# Patient Record
Sex: MALE | Race: WHITE | NOT HISPANIC OR LATINO | Employment: OTHER | URBAN - METROPOLITAN AREA
[De-identification: names, ages, dates, MRNs, and addresses within clinical notes are randomized per-mention and may not be internally consistent; named-entity substitution may affect disease eponyms.]

---

## 2017-02-23 ENCOUNTER — AMBULATORY - HEALTHEAST (OUTPATIENT)
Dept: CARE COORDINATION | Facility: CLINIC | Age: 82
End: 2017-02-23

## 2017-02-28 ENCOUNTER — AMBULATORY - HEALTHEAST (OUTPATIENT)
Dept: CARDIOLOGY | Facility: CLINIC | Age: 82
End: 2017-02-28

## 2017-02-28 DIAGNOSIS — Z95.810 ICD (IMPLANTABLE CARDIOVERTER-DEFIBRILLATOR) IN PLACE: ICD-10-CM

## 2017-03-07 ENCOUNTER — AMBULATORY - HEALTHEAST (OUTPATIENT)
Dept: CARDIOLOGY | Facility: CLINIC | Age: 82
End: 2017-03-07

## 2017-03-07 DIAGNOSIS — I42.0 DILATED IDIOPATHIC CARDIOMYOPATHY (H): ICD-10-CM

## 2017-03-07 DIAGNOSIS — R06.02 SHORTNESS OF BREATH: ICD-10-CM

## 2017-04-05 ENCOUNTER — RECORDS - HEALTHEAST (OUTPATIENT)
Dept: ADMINISTRATIVE | Facility: OTHER | Age: 82
End: 2017-04-05

## 2017-04-10 ENCOUNTER — HOSPITAL ENCOUNTER (OUTPATIENT)
Dept: CT IMAGING | Facility: CLINIC | Age: 82
Discharge: HOME | End: 2017-04-10
Attending: INTERNAL MEDICINE

## 2017-04-10 DIAGNOSIS — C85.90 NON HODGKIN'S LYMPHOMA (H): ICD-10-CM

## 2017-04-11 ENCOUNTER — RECORDS - HEALTHEAST (OUTPATIENT)
Dept: ADMINISTRATIVE | Facility: OTHER | Age: 82
End: 2017-04-11

## 2017-04-14 ENCOUNTER — COMMUNICATION - HEALTHEAST (OUTPATIENT)
Dept: INTERVENTIONAL RADIOLOGY/VASCULAR | Facility: CLINIC | Age: 82
End: 2017-04-14

## 2017-04-17 ENCOUNTER — RECORDS - HEALTHEAST (OUTPATIENT)
Dept: LAB | Facility: CLINIC | Age: 82
End: 2017-04-17

## 2017-04-17 LAB
CHOLEST SERPL-MCNC: 182 MG/DL
FASTING STATUS PATIENT QL REPORTED: ABNORMAL
HDLC SERPL-MCNC: 26 MG/DL
LDLC SERPL CALC-MCNC: 54 MG/DL
LDLC SERPL CALC-MCNC: ABNORMAL MG/DL
TRIGL SERPL-MCNC: 980 MG/DL

## 2017-04-19 ENCOUNTER — RECORDS - HEALTHEAST (OUTPATIENT)
Dept: LAB | Facility: CLINIC | Age: 82
End: 2017-04-19

## 2017-04-19 ENCOUNTER — HOSPITAL ENCOUNTER (OUTPATIENT)
Dept: ULTRASOUND IMAGING | Facility: HOSPITAL | Age: 82
Discharge: HOME | End: 2017-04-19
Attending: INTERNAL MEDICINE

## 2017-04-19 DIAGNOSIS — C85.90 NON HODGKIN'S LYMPHOMA (H): ICD-10-CM

## 2017-04-19 LAB
CHOLEST SERPL-MCNC: 202 MG/DL
FASTING STATUS PATIENT QL REPORTED: YES
HDLC SERPL-MCNC: 30 MG/DL
LDLC SERPL CALC-MCNC: 58 MG/DL
LDLC SERPL CALC-MCNC: ABNORMAL MG/DL
TRIGL SERPL-MCNC: 837 MG/DL

## 2017-04-21 LAB
LAB AP CHARGES (HE HISTORICAL CONVERSION): ABNORMAL
LAB AP CHARGES (HE HISTORICAL CONVERSION): ABNORMAL
LAB AP INITIAL CYTO EVAL (HE HISTORICAL CONVERSION): ABNORMAL
LAB MED GENERAL PATH INTERP (HE HISTORICAL CONVERSION): ABNORMAL
PATH REPORT.COMMENTS IMP SPEC: ABNORMAL
PATH REPORT.FINAL DX SPEC: ABNORMAL
PATH REPORT.FINAL DX SPEC: ABNORMAL
PATH REPORT.MICROSCOPIC SPEC OTHER STN: ABNORMAL
PATH REPORT.RELEVANT HX SPEC: ABNORMAL
RESULT FLAG (HE HISTORICAL CONVERSION): ABNORMAL
SPECIMEN DESCRIPTION: ABNORMAL

## 2017-05-10 ENCOUNTER — AMBULATORY - HEALTHEAST (OUTPATIENT)
Dept: LAB | Facility: CLINIC | Age: 82
End: 2017-05-10

## 2017-05-10 ENCOUNTER — HOSPITAL ENCOUNTER (OUTPATIENT)
Dept: CARDIOLOGY | Facility: CLINIC | Age: 82
Discharge: HOME | End: 2017-05-10
Attending: INTERNAL MEDICINE

## 2017-05-10 DIAGNOSIS — R06.02 SHORTNESS OF BREATH: ICD-10-CM

## 2017-05-10 DIAGNOSIS — I42.0 DILATED IDIOPATHIC CARDIOMYOPATHY (H): ICD-10-CM

## 2017-05-10 LAB
AORTIC ROOT: 3 CM
BSA FOR ECHO PROCEDURE: 1.98 M2
CV BLOOD PRESSURE: NORMAL MMHG
CV ECHO HEIGHT: 69 IN
CV ECHO WEIGHT: 178 LBS
DOP CALC LVOT AREA: 3.46 CM2
DOP CALC LVOT DIAMETER: 2.1 CM
DOP CALC LVOT PEAK VEL: 59.2 CM/S
DOP CALC LVOT STROKE VOLUME: 41.5 CM3
DOP CALCLVOT PEAK VEL VTI: 12 CM
EJECTION FRACTION: 41 % (ref 55–75)
FRACTIONAL SHORTENING: 21 % (ref 28–44)
INTERVENTRICULAR SEPTUM IN END DIASTOLE: 0.95 CM (ref 0.6–1)
IVS/PW RATIO: 1
LA AREA 1: 12.5 CM2
LA AREA 2: 11.9 CM2
LEFT ATRIUM LENGTH: 3.9 CM
LEFT ATRIUM SIZE: 3.2 CM
LEFT ATRIUM TO AORTIC ROOT RATIO: 1.07 NO UNITS
LEFT ATRIUM VOLUME INDEX: 16.4 ML/M2
LEFT ATRIUM VOLUME: 32.4 CM3
LEFT VENTRICLE CARDIAC INDEX: 1.3 L/MIN/M2
LEFT VENTRICLE CARDIAC OUTPUT: 2.5 L/MIN
LEFT VENTRICLE DIASTOLIC VOLUME INDEX: 37.9 CM3/M2 (ref 34–74)
LEFT VENTRICLE DIASTOLIC VOLUME: 75 CM3 (ref 62–150)
LEFT VENTRICLE HEART RATE: 60 BPM
LEFT VENTRICLE MASS INDEX: 121 G/M2
LEFT VENTRICLE SYSTOLIC VOLUME INDEX: 22.2 CM3/M2 (ref 11–31)
LEFT VENTRICLE SYSTOLIC VOLUME: 44 CM3 (ref 21–61)
LEFT VENTRICULAR INTERNAL DIMENSION IN DIASTOLE: 6.2 CM (ref 4.2–5.8)
LEFT VENTRICULAR INTERNAL DIMENSION IN SYSTOLE: 4.9 CM (ref 2.5–4)
LEFT VENTRICULAR MASS: 239.6 G
LEFT VENTRICULAR OUTFLOW TRACT MEAN GRADIENT: 1 MMHG
LEFT VENTRICULAR OUTFLOW TRACT MEAN VELOCITY: 45 CM/S
LEFT VENTRICULAR OUTFLOW TRACT PEAK GRADIENT: 1 MMHG
LEFT VENTRICULAR POSTERIOR WALL IN END DIASTOLE: 0.92 CM (ref 0.6–1)
LV STROKE VOLUME INDEX: 21 ML/M2
MITRAL VALVE E/A RATIO: 0.5
MV AVERAGE E/E' RATIO: 7.5 CM/S
MV DECELERATION TIME: 602 MS
MV E'TISSUE VEL-LAT: 5.46 CM/S
MV E'TISSUE VEL-MED: 3.41 CM/S
MV LATERAL E/E' RATIO: 6.1
MV MEDIAL E/E' RATIO: 9.8
MV PEAK A VELOCITY: 71 CM/S
MV PEAK E VELOCITY: 33.3 CM/S
NUC REST DIASTOLIC VOLUME INDEX: 2848 LBS
NUC REST SYSTOLIC VOLUME INDEX: 69 IN
TRICUSPID REGURGITATION PEAK PRESSURE GRADIENT: 25.2 MMHG
TRICUSPID VALVE PEAK REGURGITANT VELOCITY: 251 CM/S

## 2017-05-10 ASSESSMENT — MIFFLIN-ST. JEOR: SCORE: 1482.78

## 2017-05-16 ENCOUNTER — AMBULATORY - HEALTHEAST (OUTPATIENT)
Dept: CARDIOLOGY | Facility: CLINIC | Age: 82
End: 2017-05-16

## 2017-05-19 ENCOUNTER — OFFICE VISIT - HEALTHEAST (OUTPATIENT)
Dept: CARDIOLOGY | Facility: CLINIC | Age: 82
End: 2017-05-19

## 2017-05-19 ENCOUNTER — AMBULATORY - HEALTHEAST (OUTPATIENT)
Dept: CARDIOLOGY | Facility: CLINIC | Age: 82
End: 2017-05-19

## 2017-05-19 DIAGNOSIS — I42.8 OTHER PRIMARY CARDIOMYOPATHIES: ICD-10-CM

## 2017-05-19 DIAGNOSIS — R74.8 ELEVATED CK: ICD-10-CM

## 2017-05-19 ASSESSMENT — MIFFLIN-ST. JEOR: SCORE: 1491.85

## 2017-05-22 ENCOUNTER — AMBULATORY - HEALTHEAST (OUTPATIENT)
Dept: CARDIOLOGY | Facility: CLINIC | Age: 82
End: 2017-05-22

## 2017-05-22 DIAGNOSIS — R74.8 ELEVATED CK: ICD-10-CM

## 2017-05-22 DIAGNOSIS — I42.9 CARDIOMYOPATHY (H): ICD-10-CM

## 2017-05-22 DIAGNOSIS — I10 HTN (HYPERTENSION): ICD-10-CM

## 2017-05-30 ENCOUNTER — RECORDS - HEALTHEAST (OUTPATIENT)
Dept: ADMINISTRATIVE | Facility: OTHER | Age: 82
End: 2017-05-30

## 2017-06-06 ENCOUNTER — COMMUNICATION - HEALTHEAST (OUTPATIENT)
Dept: CARDIOLOGY | Facility: CLINIC | Age: 82
End: 2017-06-06

## 2017-06-06 DIAGNOSIS — I48.91 A-FIB (H): ICD-10-CM

## 2017-06-12 ENCOUNTER — HOSPITAL ENCOUNTER (OUTPATIENT)
Dept: CT IMAGING | Facility: CLINIC | Age: 82
Discharge: HOME | End: 2017-06-12
Attending: INTERNAL MEDICINE

## 2017-06-12 DIAGNOSIS — C85.90 NON HODGKIN'S LYMPHOMA (H): ICD-10-CM

## 2017-06-20 ENCOUNTER — RECORDS - HEALTHEAST (OUTPATIENT)
Dept: ADMINISTRATIVE | Facility: OTHER | Age: 82
End: 2017-06-20

## 2017-06-27 ENCOUNTER — AMBULATORY - HEALTHEAST (OUTPATIENT)
Dept: CARDIOLOGY | Facility: CLINIC | Age: 82
End: 2017-06-27

## 2017-06-27 DIAGNOSIS — Z95.810 ICD (IMPLANTABLE CARDIOVERTER-DEFIBRILLATOR) IN PLACE: ICD-10-CM

## 2017-06-27 LAB — HCC DEVICE COMMENTS: NORMAL

## 2017-07-10 ENCOUNTER — RECORDS - HEALTHEAST (OUTPATIENT)
Dept: LAB | Facility: CLINIC | Age: 82
End: 2017-07-10

## 2017-07-10 LAB
CHOLEST SERPL-MCNC: 143 MG/DL
FASTING STATUS PATIENT QL REPORTED: NO
HDLC SERPL-MCNC: 28 MG/DL
LDLC SERPL CALC-MCNC: 60 MG/DL
LDLC SERPL CALC-MCNC: ABNORMAL MG/DL
TRIGL SERPL-MCNC: 430 MG/DL

## 2017-08-31 ENCOUNTER — AMBULATORY - HEALTHEAST (OUTPATIENT)
Dept: CARDIOLOGY | Facility: CLINIC | Age: 82
End: 2017-08-31

## 2017-08-31 DIAGNOSIS — Z95.0 CARDIAC PACEMAKER IN SITU: ICD-10-CM

## 2017-08-31 LAB — HCC DEVICE COMMENTS: NORMAL

## 2017-08-31 RX ORDER — FENOFIBRATE 145 MG/1
145 TABLET, COATED ORAL DAILY
Status: SHIPPED | COMMUNITY
Start: 2017-08-31 | End: 2023-12-18

## 2017-08-31 ASSESSMENT — MIFFLIN-ST. JEOR: SCORE: 1487.32

## 2017-09-15 ENCOUNTER — HOSPITAL ENCOUNTER (OUTPATIENT)
Dept: CT IMAGING | Facility: CLINIC | Age: 82
Discharge: HOME | End: 2017-09-15
Attending: INTERNAL MEDICINE

## 2017-09-15 DIAGNOSIS — C85.90 NON HODGKIN'S LYMPHOMA (H): ICD-10-CM

## 2017-11-29 ENCOUNTER — AMBULATORY - HEALTHEAST (OUTPATIENT)
Dept: CARDIOLOGY | Facility: CLINIC | Age: 82
End: 2017-11-29

## 2017-11-29 DIAGNOSIS — Z95.810 ICD (IMPLANTABLE CARDIOVERTER-DEFIBRILLATOR) IN PLACE: ICD-10-CM

## 2017-11-29 LAB — HCC DEVICE COMMENTS: NORMAL

## 2017-12-12 ENCOUNTER — RECORDS - HEALTHEAST (OUTPATIENT)
Dept: LAB | Facility: CLINIC | Age: 82
End: 2017-12-12

## 2017-12-12 LAB
CHOLEST SERPL-MCNC: 173 MG/DL
FASTING STATUS PATIENT QL REPORTED: NO
HDLC SERPL-MCNC: 28 MG/DL
LDLC SERPL CALC-MCNC: 57 MG/DL
LDLC SERPL CALC-MCNC: ABNORMAL MG/DL
TRIGL SERPL-MCNC: 823 MG/DL

## 2018-03-02 ENCOUNTER — AMBULATORY - HEALTHEAST (OUTPATIENT)
Dept: CARDIOLOGY | Facility: CLINIC | Age: 83
End: 2018-03-02

## 2018-03-02 DIAGNOSIS — I42.9 CARDIOMYOPATHY (H): ICD-10-CM

## 2018-03-07 ENCOUNTER — AMBULATORY - HEALTHEAST (OUTPATIENT)
Dept: CARDIOLOGY | Facility: CLINIC | Age: 83
End: 2018-03-07

## 2018-03-07 DIAGNOSIS — Z95.0 CARDIAC PACEMAKER IN SITU: ICD-10-CM

## 2018-03-07 LAB — HCC DEVICE COMMENTS: NORMAL

## 2018-05-30 ENCOUNTER — RECORDS - HEALTHEAST (OUTPATIENT)
Dept: LAB | Facility: CLINIC | Age: 83
End: 2018-05-30

## 2018-05-30 LAB
ALBUMIN SERPL-MCNC: 4 G/DL (ref 3.5–5)
ALP SERPL-CCNC: 44 U/L (ref 45–120)
ALT SERPL W P-5'-P-CCNC: 40 U/L (ref 0–45)
ANION GAP SERPL CALCULATED.3IONS-SCNC: 9 MMOL/L (ref 5–18)
AST SERPL W P-5'-P-CCNC: 29 U/L (ref 0–40)
BILIRUB SERPL-MCNC: 0.8 MG/DL (ref 0–1)
BUN SERPL-MCNC: 34 MG/DL (ref 8–28)
CALCIUM SERPL-MCNC: 10 MG/DL (ref 8.5–10.5)
CHLORIDE BLD-SCNC: 105 MMOL/L (ref 98–107)
CO2 SERPL-SCNC: 24 MMOL/L (ref 22–31)
CREAT SERPL-MCNC: 1.89 MG/DL (ref 0.7–1.3)
GFR SERPL CREATININE-BSD FRML MDRD: 34 ML/MIN/1.73M2
GLUCOSE BLD-MCNC: 116 MG/DL (ref 70–125)
POTASSIUM BLD-SCNC: 4.9 MMOL/L (ref 3.5–5)
PROT SERPL-MCNC: 6.5 G/DL (ref 6–8)
SODIUM SERPL-SCNC: 138 MMOL/L (ref 136–145)

## 2018-06-12 ENCOUNTER — AMBULATORY - HEALTHEAST (OUTPATIENT)
Dept: CARDIOLOGY | Facility: CLINIC | Age: 83
End: 2018-06-12

## 2018-06-12 DIAGNOSIS — Z95.0 CARDIAC PACEMAKER IN SITU: ICD-10-CM

## 2018-06-12 LAB
HCC DEVICE COMMENTS: NORMAL
HCC DEVICE IMPLANTING PROVIDER: NORMAL
HCC DEVICE MANUFACTURE: NORMAL
HCC DEVICE MODEL: NORMAL
HCC DEVICE SERIAL NUMBER: NORMAL
HCC DEVICE TYPE: NORMAL

## 2018-06-19 ENCOUNTER — RECORDS - HEALTHEAST (OUTPATIENT)
Dept: ADMINISTRATIVE | Facility: OTHER | Age: 83
End: 2018-06-19

## 2018-06-28 ENCOUNTER — HOSPITAL ENCOUNTER (OUTPATIENT)
Dept: CT IMAGING | Facility: CLINIC | Age: 83
Discharge: HOME | End: 2018-06-28
Attending: INTERNAL MEDICINE

## 2018-06-28 DIAGNOSIS — C85.90 NON HODGKIN'S LYMPHOMA (H): ICD-10-CM

## 2018-06-28 LAB
CREAT BLD-MCNC: 1.6 MG/DL
POC GFR AMER AF HE - HISTORICAL: 52 ML/MIN/1.73M2
POC GFR NON AMER AF HE - HISTORICAL: 42 ML/MIN/1.73M2

## 2018-08-20 ENCOUNTER — AMBULATORY - HEALTHEAST (OUTPATIENT)
Dept: CARDIOLOGY | Facility: CLINIC | Age: 83
End: 2018-08-20

## 2018-08-20 ENCOUNTER — AMBULATORY - HEALTHEAST (OUTPATIENT)
Dept: LAB | Facility: HOSPITAL | Age: 83
End: 2018-08-20

## 2018-08-20 ENCOUNTER — HOSPITAL ENCOUNTER (OUTPATIENT)
Dept: CARDIOLOGY | Facility: HOSPITAL | Age: 83
Discharge: HOME | End: 2018-08-20
Attending: INTERNAL MEDICINE

## 2018-08-20 DIAGNOSIS — I42.9 CARDIOMYOPATHY (H): ICD-10-CM

## 2018-08-20 DIAGNOSIS — R06.02 SHORTNESS OF BREATH: ICD-10-CM

## 2018-08-20 LAB — BNP SERPL-MCNC: 67 PG/ML (ref 0–91)

## 2018-08-20 ASSESSMENT — MIFFLIN-ST. JEOR: SCORE: 1487.32

## 2018-08-21 LAB
AORTIC ROOT: 3.2 CM
AR DECEL SLOPE: 1860 MM/S2
AR PEAK VELOCITY: 365 CM/S
AV REGURGITANT PEAK GRADIENT: 53.3 MMHG
AV REGURGITATION PRESSURE HALF TIME: 574 MS
BSA FOR ECHO PROCEDURE: 1.99 M2
CV ECHO HEIGHT: 69 IN
CV ECHO WEIGHT: 179 LBS
DOP CALC LVOT AREA: 3.8 CM2
DOP CALC LVOT DIAMETER: 2.2 CM
DOP CALC LVOT PEAK VEL: 65.8 CM/S
DOP CALC LVOT STROKE VOLUME: 47.1 CM3
DOP CALCLVOT PEAK VEL VTI: 12.4 CM
EJECTION FRACTION: 45 % (ref 55–75)
FRACTIONAL SHORTENING: 12.9 % (ref 28–44)
INTERVENTRICULAR SEPTUM IN END DIASTOLE: 1.12 CM (ref 0.6–1)
IVS/PW RATIO: 1.2
LA AREA 1: 16.4 CM2
LA AREA 2: 17.2 CM2
LEFT ATRIUM LENGTH: 4.6 CM
LEFT ATRIUM SIZE: 2.8 CM
LEFT ATRIUM TO AORTIC ROOT RATIO: 0.88 NO UNITS
LEFT ATRIUM VOLUME INDEX: 26.2 ML/M2
LEFT ATRIUM VOLUME: 52.1 ML
LEFT VENTRICLE CARDIAC INDEX: 1.4 L/MIN/M2
LEFT VENTRICLE CARDIAC OUTPUT: 2.8 L/MIN
LEFT VENTRICLE DIASTOLIC VOLUME INDEX: 49.3 CM3/M2 (ref 34–74)
LEFT VENTRICLE DIASTOLIC VOLUME: 98.1 CM3 (ref 62–150)
LEFT VENTRICLE HEART RATE: 60 BPM
LEFT VENTRICLE MASS INDEX: 100.1 G/M2
LEFT VENTRICLE SYSTOLIC VOLUME INDEX: 27.1 CM3/M2 (ref 11–31)
LEFT VENTRICLE SYSTOLIC VOLUME: 53.9 CM3 (ref 21–61)
LEFT VENTRICULAR INTERNAL DIMENSION IN DIASTOLE: 5.1 CM (ref 4.2–5.8)
LEFT VENTRICULAR INTERNAL DIMENSION IN SYSTOLE: 4.44 CM (ref 2.5–4)
LEFT VENTRICULAR MASS: 199.2 G
LEFT VENTRICULAR OUTFLOW TRACT MEAN GRADIENT: 1 MMHG
LEFT VENTRICULAR OUTFLOW TRACT MEAN VELOCITY: 43.6 CM/S
LEFT VENTRICULAR OUTFLOW TRACT PEAK GRADIENT: 2 MMHG
LEFT VENTRICULAR POSTERIOR WALL IN END DIASTOLE: 0.97 CM (ref 0.6–1)
LV STROKE VOLUME INDEX: 23.7 ML/M2
MITRAL VALVE DECELERATION SLOPE: 1250 MM/S2
MITRAL VALVE E/A RATIO: 0.5
MITRAL VALVE PRESSURE HALF-TIME: 98 MS
MV AVERAGE E/E' RATIO: 8.5 CM/S
MV DECELERATION TIME: 335 MS
MV E'TISSUE VEL-LAT: 5.03 CM/S
MV E'TISSUE VEL-MED: 4.74 CM/S
MV LATERAL E/E' RATIO: 8.3
MV MEDIAL E/E' RATIO: 8.8
MV PEAK A VELOCITY: 89.2 CM/S
MV PEAK E VELOCITY: 41.7 CM/S
MV VALVE AREA PRESSURE 1/2 METHOD: 2.2 CM2
NUC REST DIASTOLIC VOLUME INDEX: 2864 LBS
NUC REST SYSTOLIC VOLUME INDEX: 69 IN
TRICUSPID REGURGITATION PEAK PRESSURE GRADIENT: 23.6 MMHG
TRICUSPID VALVE ANULAR PLANE SYSTOLIC EXCURSION: 2.1 CM
TRICUSPID VALVE PEAK REGURGITANT VELOCITY: 243 CM/S

## 2018-08-27 ENCOUNTER — RECORDS - HEALTHEAST (OUTPATIENT)
Dept: ADMINISTRATIVE | Facility: OTHER | Age: 83
End: 2018-08-27

## 2018-08-27 ENCOUNTER — AMBULATORY - HEALTHEAST (OUTPATIENT)
Dept: CARDIOLOGY | Facility: CLINIC | Age: 83
End: 2018-08-27

## 2018-08-30 ENCOUNTER — AMBULATORY - HEALTHEAST (OUTPATIENT)
Dept: CARDIOLOGY | Facility: CLINIC | Age: 83
End: 2018-08-30

## 2018-08-30 ENCOUNTER — OFFICE VISIT - HEALTHEAST (OUTPATIENT)
Dept: CARDIOLOGY | Facility: CLINIC | Age: 83
End: 2018-08-30

## 2018-08-30 DIAGNOSIS — R74.8 ELEVATED CK: ICD-10-CM

## 2018-08-30 DIAGNOSIS — E78.5 DYSLIPIDEMIA, GOAL LDL BELOW 70: ICD-10-CM

## 2018-08-30 DIAGNOSIS — Z95.810 ICD (IMPLANTABLE CARDIOVERTER-DEFIBRILLATOR) IN PLACE: ICD-10-CM

## 2018-08-30 DIAGNOSIS — I42.9 IDIOPATHIC CARDIOMYOPATHY (H): ICD-10-CM

## 2018-08-30 DIAGNOSIS — N18.30 CKD (CHRONIC KIDNEY DISEASE) STAGE 3, GFR 30-59 ML/MIN (H): ICD-10-CM

## 2018-08-30 LAB
ANION GAP SERPL CALCULATED.3IONS-SCNC: 9 MMOL/L (ref 5–18)
BUN SERPL-MCNC: 23 MG/DL (ref 8–28)
CALCIUM SERPL-MCNC: 9.8 MG/DL (ref 8.5–10.5)
CHLORIDE BLD-SCNC: 105 MMOL/L (ref 98–107)
CK SERPL-CCNC: 278 U/L (ref 30–190)
CO2 SERPL-SCNC: 23 MMOL/L (ref 22–31)
CREAT SERPL-MCNC: 1.45 MG/DL (ref 0.7–1.3)
GFR SERPL CREATININE-BSD FRML MDRD: 47 ML/MIN/1.73M2
GLUCOSE BLD-MCNC: 180 MG/DL (ref 70–125)
HCC DEVICE COMMENTS: NORMAL
HCC DEVICE IMPLANTING PROVIDER: NORMAL
HCC DEVICE MANUFACTURE: NORMAL
HCC DEVICE MODEL: NORMAL
HCC DEVICE SERIAL NUMBER: NORMAL
HCC DEVICE TYPE: NORMAL
POTASSIUM BLD-SCNC: 4.8 MMOL/L (ref 3.5–5)
SODIUM SERPL-SCNC: 137 MMOL/L (ref 136–145)

## 2018-08-30 ASSESSMENT — MIFFLIN-ST. JEOR: SCORE: 1510

## 2018-09-12 ENCOUNTER — COMMUNICATION - HEALTHEAST (OUTPATIENT)
Dept: CARDIOLOGY | Facility: CLINIC | Age: 83
End: 2018-09-12

## 2018-09-12 ENCOUNTER — AMBULATORY - HEALTHEAST (OUTPATIENT)
Dept: CARDIOLOGY | Facility: CLINIC | Age: 83
End: 2018-09-12

## 2018-09-12 DIAGNOSIS — R74.8 ELEVATED CK: ICD-10-CM

## 2018-09-12 DIAGNOSIS — R74.8 ELEVATED CPK: ICD-10-CM

## 2018-09-12 DIAGNOSIS — N18.30 CKD (CHRONIC KIDNEY DISEASE) STAGE 3, GFR 30-59 ML/MIN (H): ICD-10-CM

## 2018-09-12 DIAGNOSIS — I42.9 IDIOPATHIC CARDIOMYOPATHY (H): ICD-10-CM

## 2018-09-12 LAB
ANION GAP SERPL CALCULATED.3IONS-SCNC: 11 MMOL/L (ref 5–18)
BUN SERPL-MCNC: 27 MG/DL (ref 8–28)
CALCIUM SERPL-MCNC: 9.9 MG/DL (ref 8.5–10.5)
CHLORIDE BLD-SCNC: 102 MMOL/L (ref 98–107)
CK SERPL-CCNC: 525 U/L (ref 30–190)
CO2 SERPL-SCNC: 24 MMOL/L (ref 22–31)
CREAT SERPL-MCNC: 1.91 MG/DL (ref 0.7–1.3)
GFR SERPL CREATININE-BSD FRML MDRD: 34 ML/MIN/1.73M2
GLUCOSE BLD-MCNC: 180 MG/DL (ref 70–125)
POTASSIUM BLD-SCNC: 4.7 MMOL/L (ref 3.5–5)
SODIUM SERPL-SCNC: 137 MMOL/L (ref 136–145)

## 2018-09-18 ENCOUNTER — COMMUNICATION - HEALTHEAST (OUTPATIENT)
Dept: CARDIOLOGY | Facility: CLINIC | Age: 83
End: 2018-09-18

## 2018-09-27 ENCOUNTER — AMBULATORY - HEALTHEAST (OUTPATIENT)
Dept: CARDIOLOGY | Facility: CLINIC | Age: 83
End: 2018-09-27

## 2018-09-27 DIAGNOSIS — R74.8 ELEVATED CPK: ICD-10-CM

## 2018-09-27 LAB — CK SERPL-CCNC: 191 U/L (ref 30–190)

## 2018-09-28 ENCOUNTER — AMBULATORY - HEALTHEAST (OUTPATIENT)
Dept: CARDIOLOGY | Facility: CLINIC | Age: 83
End: 2018-09-28

## 2018-09-28 DIAGNOSIS — E78.5 DYSLIPIDEMIA: ICD-10-CM

## 2018-09-28 DIAGNOSIS — R74.8 ELEVATED CK: ICD-10-CM

## 2018-09-28 DIAGNOSIS — I25.10 CAD (CORONARY ARTERY DISEASE): ICD-10-CM

## 2018-11-29 ENCOUNTER — AMBULATORY - HEALTHEAST (OUTPATIENT)
Dept: CARDIOLOGY | Facility: CLINIC | Age: 83
End: 2018-11-29

## 2018-11-29 DIAGNOSIS — Z95.810 ICD (IMPLANTABLE CARDIOVERTER-DEFIBRILLATOR) IN PLACE: ICD-10-CM

## 2018-12-10 ENCOUNTER — RECORDS - HEALTHEAST (OUTPATIENT)
Dept: LAB | Facility: CLINIC | Age: 83
End: 2018-12-10

## 2018-12-10 LAB
ALBUMIN SERPL-MCNC: 3.8 G/DL (ref 3.5–5)
ALP SERPL-CCNC: 43 U/L (ref 45–120)
ALT SERPL W P-5'-P-CCNC: 38 U/L (ref 0–45)
ANION GAP SERPL CALCULATED.3IONS-SCNC: 8 MMOL/L (ref 5–18)
AST SERPL W P-5'-P-CCNC: 27 U/L (ref 0–40)
BILIRUB SERPL-MCNC: 0.6 MG/DL (ref 0–1)
BUN SERPL-MCNC: 18 MG/DL (ref 8–28)
CALCIUM SERPL-MCNC: 9.5 MG/DL (ref 8.5–10.5)
CHLORIDE BLD-SCNC: 105 MMOL/L (ref 98–107)
CHOLEST SERPL-MCNC: 190 MG/DL
CO2 SERPL-SCNC: 27 MMOL/L (ref 22–31)
CREAT SERPL-MCNC: 1.46 MG/DL (ref 0.7–1.3)
CREAT UR-MCNC: 73 MG/DL
FASTING STATUS PATIENT QL REPORTED: NO
GFR SERPL CREATININE-BSD FRML MDRD: 46 ML/MIN/1.73M2
GLUCOSE BLD-MCNC: 106 MG/DL (ref 70–125)
HDLC SERPL-MCNC: 31 MG/DL
LDLC SERPL CALC-MCNC: 99 MG/DL
LDLC SERPL CALC-MCNC: ABNORMAL MG/DL
MICROALBUMIN UR-MCNC: 3.15 MG/DL (ref 0–1.99)
MICROALBUMIN/CREAT UR: 43.2 MG/G
POTASSIUM BLD-SCNC: 4.9 MMOL/L (ref 3.5–5)
PROT SERPL-MCNC: 6.4 G/DL (ref 6–8)
SODIUM SERPL-SCNC: 140 MMOL/L (ref 136–145)
TRIGL SERPL-MCNC: 456 MG/DL

## 2019-02-12 ENCOUNTER — COMMUNICATION - HEALTHEAST (OUTPATIENT)
Dept: ADMINISTRATIVE | Facility: CLINIC | Age: 84
End: 2019-02-12

## 2019-02-18 ENCOUNTER — RECORDS - HEALTHEAST (OUTPATIENT)
Dept: ADMINISTRATIVE | Facility: OTHER | Age: 84
End: 2019-02-18

## 2019-02-18 ENCOUNTER — AMBULATORY - HEALTHEAST (OUTPATIENT)
Dept: CARDIOLOGY | Facility: CLINIC | Age: 84
End: 2019-02-18

## 2019-02-21 ENCOUNTER — OFFICE VISIT - HEALTHEAST (OUTPATIENT)
Dept: CARDIOLOGY | Facility: CLINIC | Age: 84
End: 2019-02-21

## 2019-02-21 DIAGNOSIS — R06.09 DYSPNEA ON EXERTION: ICD-10-CM

## 2019-02-21 DIAGNOSIS — Z78.9 STATIN INTOLERANCE: ICD-10-CM

## 2019-02-21 DIAGNOSIS — I42.9 IDIOPATHIC CARDIOMYOPATHY (H): ICD-10-CM

## 2019-02-21 ASSESSMENT — MIFFLIN-ST. JEOR: SCORE: 1465.22

## 2019-03-04 ENCOUNTER — AMBULATORY - HEALTHEAST (OUTPATIENT)
Dept: CARDIOLOGY | Facility: CLINIC | Age: 84
End: 2019-03-04

## 2019-03-04 DIAGNOSIS — Z95.810 ICD (IMPLANTABLE CARDIOVERTER-DEFIBRILLATOR) IN PLACE: ICD-10-CM

## 2019-03-12 ENCOUNTER — OFFICE VISIT - HEALTHEAST (OUTPATIENT)
Dept: UROLOGY | Facility: CLINIC | Age: 84
End: 2019-03-12

## 2019-03-12 DIAGNOSIS — Q62.5 DUPLICATED RIGHT RENAL COLLECTING SYSTEM: ICD-10-CM

## 2019-03-12 DIAGNOSIS — N21.0 BLADDER STONES: ICD-10-CM

## 2019-03-12 DIAGNOSIS — N20.1 CALCULUS OF URETER: ICD-10-CM

## 2019-03-12 DIAGNOSIS — N13.39 OTHER HYDRONEPHROSIS: ICD-10-CM

## 2019-03-12 LAB
ALBUMIN UR-MCNC: ABNORMAL MG/DL
APPEARANCE UR: CLEAR
BILIRUB UR QL STRIP: NEGATIVE
COLOR UR AUTO: YELLOW
GLUCOSE UR STRIP-MCNC: NEGATIVE MG/DL
HGB UR QL STRIP: ABNORMAL
KETONES UR STRIP-MCNC: ABNORMAL MG/DL
LEUKOCYTE ESTERASE UR QL STRIP: NEGATIVE
NITRATE UR QL: NEGATIVE
PH UR STRIP: 5.5 [PH] (ref 5–8)
SP GR UR STRIP: 1.02 (ref 1–1.03)
UROBILINOGEN UR STRIP-ACNC: ABNORMAL

## 2019-03-19 ENCOUNTER — SURGERY - HEALTHEAST (OUTPATIENT)
Dept: SURGERY | Facility: CLINIC | Age: 84
End: 2019-03-19

## 2019-03-19 ENCOUNTER — ANESTHESIA - HEALTHEAST (OUTPATIENT)
Dept: SURGERY | Facility: CLINIC | Age: 84
End: 2019-03-19

## 2019-03-19 ASSESSMENT — MIFFLIN-ST. JEOR: SCORE: 1280.94

## 2019-04-18 ENCOUNTER — OFFICE VISIT - HEALTHEAST (OUTPATIENT)
Dept: UROLOGY | Facility: CLINIC | Age: 84
End: 2019-04-18

## 2019-04-18 DIAGNOSIS — N21.0 CALCULUS OF URINARY BLADDER: ICD-10-CM

## 2019-04-18 LAB
ALBUMIN UR-MCNC: NEGATIVE MG/DL
APPEARANCE UR: CLEAR
BILIRUB UR QL STRIP: NEGATIVE
COLOR UR AUTO: YELLOW
GLUCOSE UR STRIP-MCNC: NEGATIVE MG/DL
HGB UR QL STRIP: NEGATIVE
KETONES UR STRIP-MCNC: NEGATIVE MG/DL
LEUKOCYTE ESTERASE UR QL STRIP: NEGATIVE
NITRATE UR QL: NEGATIVE
PH UR STRIP: 5 [PH] (ref 5–8)
SP GR UR STRIP: 1.02 (ref 1–1.03)
UROBILINOGEN UR STRIP-ACNC: NORMAL

## 2019-06-06 ENCOUNTER — AMBULATORY - HEALTHEAST (OUTPATIENT)
Dept: CARDIOLOGY | Facility: CLINIC | Age: 84
End: 2019-06-06

## 2019-06-06 DIAGNOSIS — Z95.810 ICD (IMPLANTABLE CARDIOVERTER-DEFIBRILLATOR) IN PLACE: ICD-10-CM

## 2019-08-28 ENCOUNTER — COMMUNICATION - HEALTHEAST (OUTPATIENT)
Dept: ADMINISTRATIVE | Facility: CLINIC | Age: 84
End: 2019-08-28

## 2019-09-05 ENCOUNTER — AMBULATORY - HEALTHEAST (OUTPATIENT)
Dept: CARDIOLOGY | Facility: CLINIC | Age: 84
End: 2019-09-05

## 2019-09-05 DIAGNOSIS — I10 ESSENTIAL HYPERTENSION: ICD-10-CM

## 2019-09-05 DIAGNOSIS — G47.33 OSA ON CPAP: ICD-10-CM

## 2019-09-05 DIAGNOSIS — R06.02 SHORTNESS OF BREATH: ICD-10-CM

## 2019-09-05 DIAGNOSIS — I42.9 IDIOPATHIC CARDIOMYOPATHY (H): ICD-10-CM

## 2019-09-20 ENCOUNTER — RECORDS - HEALTHEAST (OUTPATIENT)
Dept: ADMINISTRATIVE | Facility: OTHER | Age: 84
End: 2019-09-20

## 2019-11-04 ENCOUNTER — HOSPITAL ENCOUNTER (OUTPATIENT)
Dept: CARDIOLOGY | Facility: CLINIC | Age: 84
Discharge: HOME | End: 2019-11-04
Attending: INTERNAL MEDICINE

## 2019-11-04 DIAGNOSIS — G47.33 OSA ON CPAP: ICD-10-CM

## 2019-11-04 DIAGNOSIS — I10 ESSENTIAL HYPERTENSION: ICD-10-CM

## 2019-11-04 DIAGNOSIS — I42.9 IDIOPATHIC CARDIOMYOPATHY (H): ICD-10-CM

## 2019-11-04 ASSESSMENT — MIFFLIN-ST. JEOR: SCORE: 1280.94

## 2019-11-05 LAB
AORTIC ROOT: 4.1 CM
BSA FOR ECHO PROCEDURE: 1.78 M2
CV BLOOD PRESSURE: ABNORMAL MMHG
CV ECHO HEIGHT: 60 IN
CV ECHO WEIGHT: 165 LBS
DOP CALC LVOT AREA: 3.8 CM2
DOP CALC LVOT DIAMETER: 2.2 CM
DOP CALC LVOT PEAK VEL: 85 CM/S
DOP CALC LVOT STROKE VOLUME: 68.4 CM3
DOP CALCLVOT PEAK VEL VTI: 18 CM
EJECTION FRACTION: 48 % (ref 55–75)
FRACTIONAL SHORTENING: 22.8 % (ref 28–44)
INTERVENTRICULAR SEPTUM IN END DIASTOLE: 0.89 CM (ref 0.6–1)
IVS/PW RATIO: 0.9
LA AREA 1: 21.1 CM2
LA AREA 2: 19.1 CM2
LEFT ATRIUM LENGTH: 5.88 CM
LEFT ATRIUM SIZE: 2.9 CM
LEFT ATRIUM TO AORTIC ROOT RATIO: 0.71 NO UNITS
LEFT ATRIUM VOLUME INDEX: 32.7 ML/M2
LEFT ATRIUM VOLUME: 58.3 ML
LEFT VENTRICLE CARDIAC INDEX: 2.3 L/MIN/M2
LEFT VENTRICLE CARDIAC OUTPUT: 4.1 L/MIN
LEFT VENTRICLE DIASTOLIC VOLUME INDEX: 53.9 CM3/M2 (ref 34–74)
LEFT VENTRICLE DIASTOLIC VOLUME: 96 CM3 (ref 62–150)
LEFT VENTRICLE HEART RATE: 60 BPM
LEFT VENTRICLE MASS INDEX: 112.1 G/M2
LEFT VENTRICLE SYSTOLIC VOLUME INDEX: 28.1 CM3/M2 (ref 11–31)
LEFT VENTRICLE SYSTOLIC VOLUME: 50 CM3 (ref 21–61)
LEFT VENTRICULAR INTERNAL DIMENSION IN DIASTOLE: 5.43 CM (ref 4.2–5.8)
LEFT VENTRICULAR INTERNAL DIMENSION IN SYSTOLE: 4.19 CM (ref 2.5–4)
LEFT VENTRICULAR MASS: 199.5 G
LEFT VENTRICULAR OUTFLOW TRACT MEAN GRADIENT: 1 MMHG
LEFT VENTRICULAR OUTFLOW TRACT MEAN VELOCITY: 55.2 CM/S
LEFT VENTRICULAR OUTFLOW TRACT PEAK GRADIENT: 3 MMHG
LEFT VENTRICULAR POSTERIOR WALL IN END DIASTOLE: 1.04 CM (ref 0.6–1)
LV STROKE VOLUME INDEX: 38.4 ML/M2
MITRAL VALVE E/A RATIO: 0.5
MV AVERAGE E/E' RATIO: 5.1 CM/S
MV DECELERATION TIME: 447 MS
MV E'TISSUE VEL-LAT: 9.65 CM/S
MV E'TISSUE VEL-MED: 3.31 CM/S
MV LATERAL E/E' RATIO: 3.4
MV MEDIAL E/E' RATIO: 10
MV PEAK A VELOCITY: 68.6 CM/S
MV PEAK E VELOCITY: 33.1 CM/S
NUC REST DIASTOLIC VOLUME INDEX: 2640 LBS
NUC REST SYSTOLIC VOLUME INDEX: 60 IN
TRICUSPID REGURGITATION PEAK PRESSURE GRADIENT: 31.6 MMHG
TRICUSPID VALVE ANULAR PLANE SYSTOLIC EXCURSION: 1.9 CM
TRICUSPID VALVE PEAK REGURGITANT VELOCITY: 281 CM/S

## 2019-11-06 ENCOUNTER — AMBULATORY - HEALTHEAST (OUTPATIENT)
Dept: CARDIOLOGY | Facility: CLINIC | Age: 84
End: 2019-11-06

## 2019-11-06 DIAGNOSIS — G47.33 OSA ON CPAP: ICD-10-CM

## 2019-11-06 DIAGNOSIS — I42.9 IDIOPATHIC CARDIOMYOPATHY (H): ICD-10-CM

## 2019-11-06 DIAGNOSIS — I10 ESSENTIAL HYPERTENSION: ICD-10-CM

## 2019-11-06 DIAGNOSIS — R06.02 SHORTNESS OF BREATH: ICD-10-CM

## 2019-11-06 LAB
ANION GAP SERPL CALCULATED.3IONS-SCNC: 5 MMOL/L (ref 5–18)
BNP SERPL-MCNC: 290 PG/ML (ref 0–93)
BUN SERPL-MCNC: 22 MG/DL (ref 8–28)
CALCIUM SERPL-MCNC: 9.3 MG/DL (ref 8.5–10.5)
CHLORIDE BLD-SCNC: 109 MMOL/L (ref 98–107)
CO2 SERPL-SCNC: 26 MMOL/L (ref 22–31)
CREAT SERPL-MCNC: 1.77 MG/DL (ref 0.7–1.3)
GFR SERPL CREATININE-BSD FRML MDRD: 37 ML/MIN/1.73M2
GLUCOSE BLD-MCNC: 117 MG/DL (ref 70–125)
POTASSIUM BLD-SCNC: 5.1 MMOL/L (ref 3.5–5)
SODIUM SERPL-SCNC: 140 MMOL/L (ref 136–145)

## 2019-11-08 ENCOUNTER — AMBULATORY - HEALTHEAST (OUTPATIENT)
Dept: CARDIOLOGY | Facility: CLINIC | Age: 84
End: 2019-11-08

## 2019-11-08 ENCOUNTER — RECORDS - HEALTHEAST (OUTPATIENT)
Dept: ADMINISTRATIVE | Facility: OTHER | Age: 84
End: 2019-11-08

## 2019-11-11 ENCOUNTER — OFFICE VISIT - HEALTHEAST (OUTPATIENT)
Dept: CARDIOLOGY | Facility: CLINIC | Age: 84
End: 2019-11-11

## 2019-11-11 ENCOUNTER — AMBULATORY - HEALTHEAST (OUTPATIENT)
Dept: CARDIOLOGY | Facility: CLINIC | Age: 84
End: 2019-11-11

## 2019-11-11 DIAGNOSIS — Z95.810 ICD (IMPLANTABLE CARDIOVERTER-DEFIBRILLATOR) IN PLACE: ICD-10-CM

## 2019-11-11 DIAGNOSIS — I42.9 IDIOPATHIC CARDIOMYOPATHY (H): ICD-10-CM

## 2019-11-11 DIAGNOSIS — R06.09 DYSPNEA ON EXERTION: ICD-10-CM

## 2019-11-12 ENCOUNTER — HOSPITAL ENCOUNTER (OUTPATIENT)
Dept: CT IMAGING | Facility: CLINIC | Age: 84
Discharge: HOME | End: 2019-11-12
Attending: INTERNAL MEDICINE

## 2019-11-12 DIAGNOSIS — C82.05: ICD-10-CM

## 2019-11-12 LAB
CREAT BLD-MCNC: 1.8 MG/DL (ref 0.7–1.3)
GFR SERPL CREATININE-BSD FRML MDRD: 36 ML/MIN/1.73M2

## 2020-02-20 ENCOUNTER — AMBULATORY - HEALTHEAST (OUTPATIENT)
Dept: CARDIOLOGY | Facility: CLINIC | Age: 85
End: 2020-02-20

## 2020-02-20 DIAGNOSIS — I42.9 IDIOPATHIC CARDIOMYOPATHY (H): ICD-10-CM

## 2020-02-20 DIAGNOSIS — Z95.810 ICD (IMPLANTABLE CARDIOVERTER-DEFIBRILLATOR) IN PLACE: ICD-10-CM

## 2020-03-10 ENCOUNTER — RECORDS - HEALTHEAST (OUTPATIENT)
Dept: LAB | Facility: CLINIC | Age: 85
End: 2020-03-10

## 2020-03-10 LAB
ANION GAP SERPL CALCULATED.3IONS-SCNC: 9 MMOL/L (ref 5–18)
BUN SERPL-MCNC: 22 MG/DL (ref 8–28)
CALCIUM SERPL-MCNC: 8.9 MG/DL (ref 8.5–10.5)
CHLORIDE BLD-SCNC: 102 MMOL/L (ref 98–107)
CHOLEST SERPL-MCNC: 122 MG/DL
CO2 SERPL-SCNC: 25 MMOL/L (ref 22–31)
CREAT SERPL-MCNC: 1.93 MG/DL (ref 0.7–1.3)
FASTING STATUS PATIENT QL REPORTED: ABNORMAL
GFR SERPL CREATININE-BSD FRML MDRD: 33 ML/MIN/1.73M2
GLUCOSE BLD-MCNC: 189 MG/DL (ref 70–125)
HDLC SERPL-MCNC: 28 MG/DL
LDLC SERPL CALC-MCNC: 59 MG/DL
POTASSIUM BLD-SCNC: 4.4 MMOL/L (ref 3.5–5)
SODIUM SERPL-SCNC: 136 MMOL/L (ref 136–145)
TRIGL SERPL-MCNC: 174 MG/DL

## 2020-05-21 ENCOUNTER — AMBULATORY - HEALTHEAST (OUTPATIENT)
Dept: CARDIOLOGY | Facility: CLINIC | Age: 85
End: 2020-05-21

## 2020-05-21 DIAGNOSIS — I42.9 IDIOPATHIC CARDIOMYOPATHY (H): ICD-10-CM

## 2020-05-21 DIAGNOSIS — Z95.810 ICD (IMPLANTABLE CARDIOVERTER-DEFIBRILLATOR) IN PLACE: ICD-10-CM

## 2020-07-27 ENCOUNTER — RECORDS - HEALTHEAST (OUTPATIENT)
Dept: ADMINISTRATIVE | Facility: OTHER | Age: 85
End: 2020-07-27

## 2020-08-12 ENCOUNTER — COMMUNICATION - HEALTHEAST (OUTPATIENT)
Dept: TELEHEALTH | Facility: CLINIC | Age: 85
End: 2020-08-12

## 2020-08-12 ENCOUNTER — HOSPITAL ENCOUNTER (OUTPATIENT)
Dept: CT IMAGING | Facility: CLINIC | Age: 85
Discharge: HOME | End: 2020-08-12
Attending: INTERNAL MEDICINE

## 2020-08-12 DIAGNOSIS — C82.05: ICD-10-CM

## 2020-08-12 LAB
CREAT BLD-MCNC: 2 MG/DL (ref 0.7–1.3)
GFR SERPL CREATININE-BSD FRML MDRD: 32 ML/MIN/1.73M2

## 2020-08-25 ENCOUNTER — AMBULATORY - HEALTHEAST (OUTPATIENT)
Dept: CARDIOLOGY | Facility: CLINIC | Age: 85
End: 2020-08-25

## 2020-08-25 DIAGNOSIS — I42.9 IDIOPATHIC CARDIOMYOPATHY (H): ICD-10-CM

## 2020-08-25 DIAGNOSIS — Z95.810 ICD (IMPLANTABLE CARDIOVERTER-DEFIBRILLATOR) IN PLACE: ICD-10-CM

## 2020-11-02 ENCOUNTER — AMBULATORY - HEALTHEAST (OUTPATIENT)
Dept: CARDIOLOGY | Facility: CLINIC | Age: 85
End: 2020-11-02

## 2020-11-02 DIAGNOSIS — I42.9 IDIOPATHIC CARDIOMYOPATHY (H): ICD-10-CM

## 2020-11-02 DIAGNOSIS — N18.30 CKD (CHRONIC KIDNEY DISEASE) STAGE 3, GFR 30-59 ML/MIN (H): ICD-10-CM

## 2020-11-02 DIAGNOSIS — R06.02 SHORTNESS OF BREATH: ICD-10-CM

## 2020-11-02 DIAGNOSIS — I10 ESSENTIAL HYPERTENSION: ICD-10-CM

## 2020-12-15 ENCOUNTER — AMBULATORY - HEALTHEAST (OUTPATIENT)
Dept: LAB | Facility: CLINIC | Age: 85
End: 2020-12-15

## 2020-12-15 ENCOUNTER — HOSPITAL ENCOUNTER (OUTPATIENT)
Dept: CARDIOLOGY | Facility: CLINIC | Age: 85
Discharge: HOME | End: 2020-12-15
Attending: INTERNAL MEDICINE

## 2020-12-15 DIAGNOSIS — I42.9 IDIOPATHIC CARDIOMYOPATHY (H): ICD-10-CM

## 2020-12-15 DIAGNOSIS — I10 ESSENTIAL HYPERTENSION: ICD-10-CM

## 2020-12-15 DIAGNOSIS — N18.30 CKD (CHRONIC KIDNEY DISEASE) STAGE 3, GFR 30-59 ML/MIN (H): ICD-10-CM

## 2020-12-15 DIAGNOSIS — R06.02 SHORTNESS OF BREATH: ICD-10-CM

## 2020-12-15 LAB
ANION GAP SERPL CALCULATED.3IONS-SCNC: 9 MMOL/L (ref 5–18)
AORTIC ROOT: 3.6 CM
AORTIC VALVE MEAN VELOCITY: 88.1 CM/S
AR DECEL SLOPE: 1470 MM/S2
AR PEAK VELOCITY: 376 CM/S
ASCENDING AORTA: 3.1 CM
AV DIMENSIONLESS INDEX VTI: 0.7
AV MEAN GRADIENT: 4 MMHG
AV PEAK GRADIENT: 5.6 MMHG
AV REGURGITANT PEAK GRADIENT: 56.6 MMHG
AV REGURGITATION PRESSURE HALF TIME: 750 MS
AV VALVE AREA: 2.3 CM2
AV VELOCITY RATIO: 0.8
BNP SERPL-MCNC: 224 PG/ML (ref 0–93)
BSA FOR ECHO PROCEDURE: 1.88 M2
BUN SERPL-MCNC: 25 MG/DL (ref 8–28)
CALCIUM SERPL-MCNC: 9.4 MG/DL (ref 8.5–10.5)
CHLORIDE BLD-SCNC: 106 MMOL/L (ref 98–107)
CO2 SERPL-SCNC: 25 MMOL/L (ref 22–31)
CREAT SERPL-MCNC: 1.86 MG/DL (ref 0.7–1.3)
CV BLOOD PRESSURE: ABNORMAL MMHG
CV ECHO HEIGHT: 60 IN
CV ECHO WEIGHT: 185 LBS
DOP CALC AO PEAK VEL: 118 CM/S
DOP CALC AO VTI: 27.2 CM
DOP CALC LVOT AREA: 3.14 CM2
DOP CALC LVOT DIAMETER: 2 CM
DOP CALC LVOT PEAK VEL: 90.2 CM/S
DOP CALC LVOT STROKE VOLUME: 63.1 CM3
DOP CALCLVOT PEAK VEL VTI: 20.1 CM
EJECTION FRACTION: 45 % (ref 55–75)
FRACTIONAL SHORTENING: 23.8 % (ref 28–44)
GFR SERPL CREATININE-BSD FRML MDRD: 35 ML/MIN/1.73M2
GLUCOSE BLD-MCNC: 138 MG/DL (ref 70–125)
INTERVENTRICULAR SEPTUM IN END DIASTOLE: 0.96 CM (ref 0.6–1)
IVS/PW RATIO: 1
LA AREA 1: 16.9 CM2
LA AREA 2: 13.9 CM2
LEFT ATRIUM LENGTH: 4.24 CM
LEFT ATRIUM SIZE: 3 CM
LEFT ATRIUM VOLUME INDEX: 25 ML/M2
LEFT ATRIUM VOLUME: 47.1 ML
LEFT VENTRICLE CARDIAC INDEX: 2 L/MIN/M2
LEFT VENTRICLE CARDIAC OUTPUT: 3.8 L/MIN
LEFT VENTRICLE DIASTOLIC VOLUME INDEX: 45.2 CM3/M2 (ref 34–74)
LEFT VENTRICLE DIASTOLIC VOLUME: 85 CM3 (ref 62–150)
LEFT VENTRICLE HEART RATE: 60 BPM
LEFT VENTRICLE MASS INDEX: 108.1 G/M2
LEFT VENTRICLE SYSTOLIC VOLUME INDEX: 25 CM3/M2 (ref 11–31)
LEFT VENTRICLE SYSTOLIC VOLUME: 47 CM3 (ref 21–61)
LEFT VENTRICULAR INTERNAL DIMENSION IN DIASTOLE: 5.55 CM (ref 4.2–5.8)
LEFT VENTRICULAR INTERNAL DIMENSION IN SYSTOLE: 4.23 CM (ref 2.5–4)
LEFT VENTRICULAR MASS: 203.2 G
LEFT VENTRICULAR OUTFLOW TRACT MEAN GRADIENT: 2 MMHG
LEFT VENTRICULAR OUTFLOW TRACT MEAN VELOCITY: 71.4 CM/S
LEFT VENTRICULAR OUTFLOW TRACT PEAK GRADIENT: 3 MMHG
LEFT VENTRICULAR POSTERIOR WALL IN END DIASTOLE: 0.94 CM (ref 0.6–1)
LV STROKE VOLUME INDEX: 33.6 ML/M2
MITRAL VALVE E/A RATIO: 0.5
MV AVERAGE E/E' RATIO: 6.7 CM/S
MV DECELERATION TIME: 208 MS
MV E'TISSUE VEL-LAT: 6.14 CM/S
MV E'TISSUE VEL-MED: 2.73 CM/S
MV LATERAL E/E' RATIO: 4.8
MV MEDIAL E/E' RATIO: 10.8
MV PEAK A VELOCITY: 56.3 CM/S
MV PEAK E VELOCITY: 29.6 CM/S
NUC REST DIASTOLIC VOLUME INDEX: 2960 LBS
NUC REST SYSTOLIC VOLUME INDEX: 60 IN
POTASSIUM BLD-SCNC: 4.5 MMOL/L (ref 3.5–5)
SODIUM SERPL-SCNC: 140 MMOL/L (ref 136–145)
TRICUSPID REGURGITATION PEAK PRESSURE GRADIENT: 31.1 MMHG
TRICUSPID VALVE ANULAR PLANE SYSTOLIC EXCURSION: 2.1 CM
TRICUSPID VALVE PEAK REGURGITANT VELOCITY: 279 CM/S

## 2020-12-15 ASSESSMENT — MIFFLIN-ST. JEOR: SCORE: 1371.65

## 2021-01-06 ENCOUNTER — RECORDS - HEALTHEAST (OUTPATIENT)
Dept: LAB | Facility: CLINIC | Age: 86
End: 2021-01-06

## 2021-01-06 LAB
ALBUMIN SERPL-MCNC: 4 G/DL (ref 3.5–5)
ALP SERPL-CCNC: 44 U/L (ref 45–120)
ALT SERPL W P-5'-P-CCNC: 29 U/L (ref 0–45)
ANION GAP SERPL CALCULATED.3IONS-SCNC: 10 MMOL/L (ref 5–18)
AST SERPL W P-5'-P-CCNC: 26 U/L (ref 0–40)
BILIRUB SERPL-MCNC: 0.5 MG/DL (ref 0–1)
BUN SERPL-MCNC: 29 MG/DL (ref 8–28)
CALCIUM SERPL-MCNC: 9.3 MG/DL (ref 8.5–10.5)
CHLORIDE BLD-SCNC: 106 MMOL/L (ref 98–107)
CHOLEST SERPL-MCNC: 133 MG/DL
CO2 SERPL-SCNC: 23 MMOL/L (ref 22–31)
CREAT SERPL-MCNC: 1.87 MG/DL (ref 0.7–1.3)
CREAT UR-MCNC: 42.1 MG/DL
FASTING STATUS PATIENT QL REPORTED: NO
GFR SERPL CREATININE-BSD FRML MDRD: 34 ML/MIN/1.73M2
GLUCOSE BLD-MCNC: 133 MG/DL (ref 70–125)
HDLC SERPL-MCNC: 34 MG/DL
LDLC SERPL CALC-MCNC: 60 MG/DL
MICROALBUMIN UR-MCNC: 1.13 MG/DL (ref 0–1.99)
MICROALBUMIN/CREAT UR: 26.8 MG/G
POTASSIUM BLD-SCNC: 4.7 MMOL/L (ref 3.5–5)
PROT SERPL-MCNC: 6.6 G/DL (ref 6–8)
SODIUM SERPL-SCNC: 139 MMOL/L (ref 136–145)
TRIGL SERPL-MCNC: 196 MG/DL

## 2021-01-07 ENCOUNTER — RECORDS - HEALTHEAST (OUTPATIENT)
Dept: ADMINISTRATIVE | Facility: OTHER | Age: 86
End: 2021-01-07

## 2021-01-07 ENCOUNTER — AMBULATORY - HEALTHEAST (OUTPATIENT)
Dept: CARDIOLOGY | Facility: CLINIC | Age: 86
End: 2021-01-07

## 2021-01-13 ENCOUNTER — COMMUNICATION - HEALTHEAST (OUTPATIENT)
Dept: CARDIOLOGY | Facility: CLINIC | Age: 86
End: 2021-01-13

## 2021-01-14 ENCOUNTER — OFFICE VISIT - HEALTHEAST (OUTPATIENT)
Dept: CARDIOLOGY | Facility: CLINIC | Age: 86
End: 2021-01-14

## 2021-01-14 DIAGNOSIS — I10 ESSENTIAL HYPERTENSION: ICD-10-CM

## 2021-01-14 DIAGNOSIS — E78.5 DYSLIPIDEMIA, GOAL LDL BELOW 100: ICD-10-CM

## 2021-01-14 DIAGNOSIS — N18.30 CKD (CHRONIC KIDNEY DISEASE) STAGE 3, GFR 30-59 ML/MIN (H): ICD-10-CM

## 2021-01-14 DIAGNOSIS — I42.9 IDIOPATHIC CARDIOMYOPATHY (H): ICD-10-CM

## 2021-01-14 DIAGNOSIS — R06.09 DYSPNEA ON EXERTION: ICD-10-CM

## 2021-01-14 DIAGNOSIS — Z95.810 ICD (IMPLANTABLE CARDIOVERTER-DEFIBRILLATOR) IN PLACE: ICD-10-CM

## 2021-01-14 ASSESSMENT — MIFFLIN-ST. JEOR: SCORE: 1514.53

## 2021-02-02 ENCOUNTER — AMBULATORY - HEALTHEAST (OUTPATIENT)
Dept: CARDIOLOGY | Facility: CLINIC | Age: 86
End: 2021-02-02

## 2021-02-02 DIAGNOSIS — Z95.810 ICD (IMPLANTABLE CARDIOVERTER-DEFIBRILLATOR) IN PLACE: ICD-10-CM

## 2021-02-02 DIAGNOSIS — I42.9 IDIOPATHIC CARDIOMYOPATHY (H): ICD-10-CM

## 2021-05-04 ENCOUNTER — AMBULATORY - HEALTHEAST (OUTPATIENT)
Dept: CARDIOLOGY | Facility: CLINIC | Age: 86
End: 2021-05-04

## 2021-05-04 DIAGNOSIS — I42.9 IDIOPATHIC CARDIOMYOPATHY (H): ICD-10-CM

## 2021-05-04 DIAGNOSIS — Z95.810 ICD (IMPLANTABLE CARDIOVERTER-DEFIBRILLATOR) IN PLACE: ICD-10-CM

## 2021-05-25 ENCOUNTER — RECORDS - HEALTHEAST (OUTPATIENT)
Dept: ADMINISTRATIVE | Facility: CLINIC | Age: 86
End: 2021-05-25

## 2021-05-26 ENCOUNTER — RECORDS - HEALTHEAST (OUTPATIENT)
Dept: ADMINISTRATIVE | Facility: CLINIC | Age: 86
End: 2021-05-26

## 2021-05-27 ENCOUNTER — RECORDS - HEALTHEAST (OUTPATIENT)
Dept: ADMINISTRATIVE | Facility: CLINIC | Age: 86
End: 2021-05-27

## 2021-05-27 NOTE — PATIENT INSTRUCTIONS - HE
Patient Stated Goal: Prevent further stones  INCREASING FLUIDS TO DECREASE RISK    Low Urinary Volume:     Kidney stones form because there is not enough water to dissolve the concentrated chemicals and minerals in urine.     Studies have found that people who make kidney stones should drink enough fluids so that they produce at least 2 liters (2 quarts) of urine per day.     Studies have shown that virtually every fluid you might drink decreases the risk of stone formation. Acceptable fluids include milk, coffee, diet and regular sodas, alcoholic beverages, fruit juices and even plain old water.    Increasing fluid intake will increase urine volume and decrease the chance of kidney stone formation.    Fluids:    Anything liquid that fits in a glass counts.     One way to gauge if your body has enough fluids is to check the color of your urine. If the urine is dark and yellow you do not have enough fluids. Urine will be pale yellow to clear if your body has enough fluids.    What we need to survive:    Most fluid we drink is lost through evaporation, more if active in hot humid environments    Body wastes can be cleared in a small amount of urine    All fluid that is consumed in excess of necessary losses  dilutes the urine    Ways to Increase Fluids Daily:    Drink more fluids throughout the day and into the evening. (You may need to awake from sleep to urinate which is a good indication of volume status)    Keep your refrigerator stocked with beverages you like    Drink a variety of fluids - mix it up    Add another beverage to your regular beverage at every meal    Keep a beverage at your desk (work area) and finish it before you leave for breaks, meetings, lunch and end of day    Use larger volume glasses    Whenever you pass a water fountain - stop and drink    Drink a beverage with snacks, if it is a salty snack, double the beverage    Take medication with a full glass of water/fluid    Keep a bottle of  water in your car and drink every 20 miles    Eat high water content fruits (melons, oranges, grapes, etc.)    Flavor water with a squeeze of lemon or lime or Crystal Light     If you do something repetitive throughout the day, link it with having something to drink    When you give your child/children something to drink, you have something to drink also    [facility] can respond to your questions or concerns 24 hours a day at [phone].

## 2021-05-27 NOTE — PROGRESS NOTES
Assessment/Plan:        Diagnoses and all orders for this visit:    Calculus of urinary bladder  -     Urinalysis Macroscopic  -     CT Abdomen Pelvis Without Oral Without IV Contrast; Future; Expected date: 10/15/2019  -     Patient Stated Goal: Prevent further stones  -     Patient Increasing Fluids Education      Stone Management Plan  [facility] 3/12/2019 4/18/2019   Urinary Tract Infection No suspicion of infection No suspicion of infection   Renal Colic Asymptomatic at this time Asymptomatic at this time   Renal Failure No suspicion of renal failure No suspicion of renal failure   Current CT date 3/6/2019 -   Right sided stones? Yes -   R Hydronephrosis Mild -   R Stone Event New event Resolved event   Diagnosis date 3/6/2019 -   Initial location of primary symptomatic stone Distal -   Initial GSD of primary symptomatic stone 10 -   Resolved date - 4/18/2019   R Post-op status - No imaging   R Current Plan Clear -   Clear rationale Poor prognosis -   Left sided stones? No -   L Stone Event No current event No current event         Subjective:      HPI  Mr. Anthony Tiwari is a 83 y.o.  male returning to the [facility] [place] for late postoperative follow-up.     He returns status post cystolitholapaxy, laser lithotripsy for bladder stones. Preoperative suspected right distal ureteral stone within partially duplicated collecting system was not encountered. He has had no unanticipated events.     He is asymptomatic at present. He denies symptoms of fever, chills, flank pain, nausea, vomiting, urinary frequency and dysuria.    Imaging was not performed today because stone was extracted intact and patient is asymptomatic.     Stone composition was 100 % uric acid.     PLAN    82 yo diabetic M with hx of cardiomyopathy s/p ICD and non-Hodgkin's lymphoma s/p chemo. Interval surgical clearance of multiple uric acid bladder stones. Partially duplicated right collecting system.    He  will return in 6 months with low dose CT scan.      Patient also seen and examined by LILIANE Hunt   Review of systems is negative except for HPI.    Past Medical History:   Diagnosis Date     Anxiety      Bladder stone      Cardiomyopathy      CKD (chronic kidney disease) stage 3, GFR 30-59 ml/min (H)      Diabetes mellitus (H)      Dyslipidemia, goal LDL below 100      Essential hypertension      Hypertriglyceridemia     As high as 980 mg/dl in past     ICD (implantable cardioverter-defibrillator) in place      Kidney stone      Lymphoma (H) 2015    see Dr. Cody at [facility], getting chemo again in 2017     Nonocclusive coronary atherosclerosis of native coronary artery     nonobstuctive by cor angio       SIRIA on CPAP 2013    he sees someone at [facility]     Statin intolerance 2/21/2019    History of elevated CPK on both simvastatin and atorvastatin.     Past Surgical History:   Procedure Laterality Date     CARDIAC DEFIBRILLATOR PLACEMENT  07/24/2013     CATARACT EXTRACTION W/ INTRAOCULAR LENS  IMPLANT, BILATERAL  05/2018     CYSTOSCOPY W/ LITHOLAPAXY / EHL N/A 3/19/2019    Procedure: CYSTOLITHOLAPAXY;  Surgeon: Yunior Moore MD;  Location: [facility];  Service: Urology     HERNIA REPAIR Left      WA CYSTO/URETERO W/LITHOTRIPSY &INDWELL STENT INSRT Right 3/19/2019    Procedure: CYSTOSCOPY RIGHT URETEROSCOPY LASER LITHOTRIPSY;  Surgeon: Yunior Moore MD;  Location: [facility];  Service: Urology     TOTAL HIP ARTHROPLASTY Right 2012     URETEROSCOPY         Current Outpatient Medications   Medication Sig Dispense Refill     aspirin 81 MG EC tablet Take 81 mg by mouth bedtime.       atorvastatin (LIPITOR) 20 MG tablet Take 20 mg by mouth at bedtime.       fenofibrate (TRICOR) 145 MG tablet Take 145 mg by mouth daily.       insulin NPH-insulin regular (NOVOLIN 70-30) 100 unit/mL (70-30) injection Inject 12 Units under the skin 2 (two) times a day before meals.               lisinopril (PRINIVIL,ZESTRIL) 2.5 MG tablet Take 2.5 mg by mouth bedtime.        metFORMIN (GLUCOPHAGE) 500 MG tablet Take 1,000 mg by mouth 2 (two) times a day.         2     metoprolol (TOPROL-XL) 200 MG 24 hr tablet Take 200 mg by mouth bedtime.        omeprazole (PRILOSEC OTC) 20 MG tablet Take 20 mg by mouth bedtime.        spironolactone (ALDACTONE) 25 MG tablet Take 12.5 mg by mouth bedtime.        No current facility-administered medications for this visit.        Allergies   Allergen Reactions     Simvastatin Other (See Comments)     Elevated CPK       Social History     Socioeconomic History     Marital status:      Spouse name: Rebeka     Number of children: 3     Years of education: Not on file     Highest education level: Not on file   Occupational History     Occupation: Retired     Employer: RETIRED   Social Needs     Financial resource strain: Not on file     Food insecurity:     Worry: Not on file     Inability: Not on file     Transportation needs:     Medical: Not on file     Non-medical: Not on file   Tobacco Use     Smoking status: Never Smoker     Smokeless tobacco: Never Used   Substance and Sexual Activity     Alcohol use: No     Drug use: No     Sexual activity: Not on file   Lifestyle     Physical activity:     Days per week: Not on file     Minutes per session: Not on file     Stress: Not on file   Relationships     Social connections:     Talks on phone: Not on file     Gets together: Not on file     Attends Jain service: Not on file     Active member of club or organization: Not on file     Attends meetings of clubs or organizations: Not on file     Relationship status: Not on file     Intimate partner violence:     Fear of current or ex partner: Not on file     Emotionally abused: Not on file     Physically abused: Not on file     Forced sexual activity: Not on file   Other Topics Concern     Not on file   Social History Narrative    He walks for an hour most  days and still takes care of his lawn and snow removal, though his grandsons are helping more with the lawn in 2018. 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       Family History   Problem Relation Age of Onset     Rheumatic Heart Disease Brother         half brother     Dementia Brother      Stroke Father      Rheumatic Heart Disease Sister      Sudden death Sister 67        no autopsy     CABG Sister 81     No Medical Problems Daughter      No Medical Problems Daughter      No Medical Problems Daughter      Urolithiasis Neg Hx      Clotting disorder Neg Hx      Gout Neg Hx      Objective:      Physical Exam  Vitals:    04/18/19 1321   BP: 97/51   Pulse: 60   Temp: 97.9  F (36.6  C)     General - well developed, well nourished, appropriate for age. Appears no distress at this time  Abdomen - slender soft, non-tender, no hepatosplenomegaly, no masses.   - no flank tenderness, no suprapubic tenderness, kidney and bladder non-palpable  MSK - normal spinal curvature. no spinal tenderness. normal gait. muscular strength intact.  Psych - oriented to time, place, and person, normal mood and affect.      Labs   Urinalysis POC (Office):  Nitrite, UA   Date Value Ref Range Status   04/18/2019 Negative Negative Final   03/12/2019 Negative Negative Final   03/06/2019 Negative Negative Final       Lab Urinalysis:  Blood, UA   Date Value Ref Range Status   04/18/2019 Negative Negative Final   03/12/2019 Moderate (!) Negative Final   03/06/2019 Large (!) Negative Final     Nitrite, UA   Date Value Ref Range Status   04/18/2019 Negative Negative Final   03/12/2019 Negative Negative Final   03/06/2019 Negative Negative Final     Leukocytes, UA   Date Value Ref Range Status   04/18/2019 Negative Negative Final   03/12/2019 Negative Negative Final   03/06/2019 Negative Negative Final     pH, UA   Date Value Ref Range Status   04/18/2019 5.0 5.0 - 8.0 Final   03/12/2019 5.5 5.0 - 8.0 Final   03/06/2019 5.0  4.5 - 8.0 Final

## 2021-05-28 ENCOUNTER — RECORDS - HEALTHEAST (OUTPATIENT)
Dept: ADMINISTRATIVE | Facility: CLINIC | Age: 86
End: 2021-05-28

## 2021-05-29 ENCOUNTER — RECORDS - HEALTHEAST (OUTPATIENT)
Dept: ADMINISTRATIVE | Facility: CLINIC | Age: 86
End: 2021-05-29

## 2021-05-30 VITALS — WEIGHT: 178 LBS | HEIGHT: 69 IN | BODY MASS INDEX: 26.36 KG/M2

## 2021-05-31 ENCOUNTER — RECORDS - HEALTHEAST (OUTPATIENT)
Dept: ADMINISTRATIVE | Facility: CLINIC | Age: 86
End: 2021-05-31

## 2021-05-31 VITALS — HEIGHT: 69 IN | BODY MASS INDEX: 26.51 KG/M2 | WEIGHT: 179 LBS

## 2021-05-31 VITALS — WEIGHT: 180 LBS | HEIGHT: 69 IN | BODY MASS INDEX: 26.66 KG/M2

## 2021-06-01 VITALS — WEIGHT: 184 LBS | BODY MASS INDEX: 27.25 KG/M2 | HEIGHT: 69 IN

## 2021-06-01 VITALS — HEIGHT: 69 IN | WEIGHT: 179 LBS | BODY MASS INDEX: 26.51 KG/M2

## 2021-06-02 ENCOUNTER — RECORDS - HEALTHEAST (OUTPATIENT)
Dept: ADMINISTRATIVE | Facility: CLINIC | Age: 86
End: 2021-06-02

## 2021-06-02 VITALS — WEIGHT: 180 LBS | HEIGHT: 67 IN | BODY MASS INDEX: 28.25 KG/M2

## 2021-06-02 VITALS — BODY MASS INDEX: 32.39 KG/M2 | WEIGHT: 165 LBS | HEIGHT: 60 IN

## 2021-06-03 VITALS
RESPIRATION RATE: 16 BRPM | OXYGEN SATURATION: 96 % | BODY MASS INDEX: 36.18 KG/M2 | WEIGHT: 185.25 LBS | HEART RATE: 53 BPM | SYSTOLIC BLOOD PRESSURE: 108 MMHG | DIASTOLIC BLOOD PRESSURE: 56 MMHG

## 2021-06-03 VITALS — WEIGHT: 165 LBS | HEIGHT: 60 IN | BODY MASS INDEX: 32.39 KG/M2

## 2021-06-03 NOTE — PROGRESS NOTES
In clinic device check with Device RN and Dr. Noriega.  Please see link for full device report.  Patient was informed of results and next follow up during today's visit.

## 2021-06-03 NOTE — PATIENT INSTRUCTIONS - HE
Anthony,    It was a pleasure to see you today at [facility].    You will see Dr. Noriega in one year after a new echocardiogram and blood tests.     Please call us if you have any questions or concerns about your heart.      Josh Noriega M.D.

## 2021-06-05 VITALS — BODY MASS INDEX: 36.32 KG/M2 | WEIGHT: 185 LBS | HEIGHT: 60 IN

## 2021-06-05 VITALS
SYSTOLIC BLOOD PRESSURE: 124 MMHG | RESPIRATION RATE: 12 BRPM | HEIGHT: 69 IN | WEIGHT: 185 LBS | BODY MASS INDEX: 27.4 KG/M2 | HEART RATE: 60 BPM | DIASTOLIC BLOOD PRESSURE: 60 MMHG

## 2021-06-09 ENCOUNTER — RECORDS - HEALTHEAST (OUTPATIENT)
Dept: ADMINISTRATIVE | Facility: CLINIC | Age: 86
End: 2021-06-09

## 2021-06-14 ENCOUNTER — AMBULATORY - HEALTHEAST (OUTPATIENT)
Dept: LAB | Facility: CLINIC | Age: 86
End: 2021-06-14

## 2021-06-14 ENCOUNTER — HOSPITAL ENCOUNTER (OUTPATIENT)
Dept: CT IMAGING | Facility: CLINIC | Age: 86
Discharge: HOME | End: 2021-06-14
Attending: INTERNAL MEDICINE
Payer: MEDICARE

## 2021-06-14 DIAGNOSIS — N18.30 CKD (CHRONIC KIDNEY DISEASE) STAGE 3, GFR 30-59 ML/MIN (H): ICD-10-CM

## 2021-06-14 DIAGNOSIS — E78.5 DYSLIPIDEMIA, GOAL LDL BELOW 100: ICD-10-CM

## 2021-06-14 DIAGNOSIS — C82.05 FOLLICULAR LYMPHOMA GRADE I, LYMPH NODES OF INGUINAL REGION AND LOWER LIMB (H): ICD-10-CM

## 2021-06-14 DIAGNOSIS — R06.09 DYSPNEA ON EXERTION: ICD-10-CM

## 2021-06-14 DIAGNOSIS — I42.9 IDIOPATHIC CARDIOMYOPATHY (H): ICD-10-CM

## 2021-06-14 LAB
ALBUMIN SERPL-MCNC: 3.8 G/DL (ref 3.5–5)
ANION GAP SERPL CALCULATED.3IONS-SCNC: 6 MMOL/L (ref 5–18)
BNP SERPL-MCNC: 118 PG/ML (ref 0–93)
BUN SERPL-MCNC: 40 MG/DL (ref 8–28)
CALCIUM SERPL-MCNC: 9.1 MG/DL (ref 8.5–10.5)
CHLORIDE BLD-SCNC: 109 MMOL/L (ref 98–107)
CHOLEST SERPL-MCNC: 119 MG/DL
CO2 SERPL-SCNC: 21 MMOL/L (ref 22–31)
CREAT SERPL-MCNC: 1.98 MG/DL (ref 0.7–1.3)
FASTING STATUS PATIENT QL REPORTED: YES
GFR SERPL CREATININE-BSD FRML MDRD: 32 ML/MIN/1.73M2
GLUCOSE BLD-MCNC: 111 MG/DL (ref 70–125)
HDLC SERPL-MCNC: 31 MG/DL
LDLC SERPL CALC-MCNC: 50 MG/DL
PHOSPHATE SERPL-MCNC: 2.6 MG/DL (ref 2.5–4.5)
POTASSIUM BLD-SCNC: 5.2 MMOL/L (ref 3.5–5)
SODIUM SERPL-SCNC: 136 MMOL/L (ref 136–145)
TRIGL SERPL-MCNC: 188 MG/DL

## 2021-06-14 NOTE — PATIENT INSTRUCTIONS - HE
Anthony,    It was a pleasure to see you today at [facility].    Please discuss your diarrhea with Dr. García Peña.    You will see Dr. Noriega in one year after a new echocardiogram and blood tests.     Please call us if you have any questions or concerns about your heart.      Josh Noriega M.D.  [facility]

## 2021-06-14 NOTE — TELEPHONE ENCOUNTER
Wellness Screening Tool  Symptom Screening:  Do you have one of the following NEW symptoms:    Fever (subjective or >100.0)?  No    A new cough?  No    Shortness of breath?  No     Chills? No     New loss of taste or smell? No     Generalized body aches? No     New persistent headache? No     New sore throat? No     Nausea, vomiting, or diarrhea?  No    Within the past 2 weeks, have you been exposed to someone with a known positive illness below:    COVID-19 (known or suspected)?  No    Chicken pox?  No    Mealses?  No    Pertussis?  No    Patient notified of visitor policy- No visitors are allowed to accompany the patient at this time. Yes  Pt informed to wear a mask: Yes  Pt notified if they develop any symptoms listed above, prior to their appointment, they are to call the clinic directly at [phone] for further instructions.  Yes  Patient's appointment status: Patient will be seen in clinic as scheduled on 1/14

## 2021-06-18 NOTE — LETTER
Letter by Meng Noriega MD at      Author: Meng Noriega MD Service: -- Author Type: --    Filed:  Encounter Date: 2/12/2019 Status: (Other)       Anthony KEVIN Crume  [address] [place] [address]      February 12, 2019      Dear Anthony,    This letter is to remind you that you will be due for your follow up appointment with Dr. Meng Noriega  . To help ensure you are in the best health possible, a regular follow-up with your cardiologist is essential.     Please call our Patient Scheduling Line at [phone] to schedule your appointment at your earliest convenience.  If you have recently scheduled an appointment, please disregard this letter.    We look forward to seeing you again. As always, we are available at the number  above for any questions or concerns you may have.      Sincerely,     The Physicians and Staff of [facility]

## 2021-06-18 NOTE — LETTER
Letter by Mira Cornejo RDCS at      Author: Mira Cornejo RDCS Service: -- Author Type: --    Filed:  Encounter Date: 3/4/2019 Status: (Other)       Anthony Tiwari  [address]  [place] [address]      March 4, 2019      Dear Mr. Tiwari,    RE: Remote Results    We are writing to you regarding your recent Remote ICD check from home. Your transmission was received successfully. Battery status is satisfactory at this time.     Your results are within normal limits.    Your next device appointment will be a remote check on June 6, 2019; this will occur automatically.    To schedule or reschedule, please call [phone] and press 1.    NOTE: If you would like to do an extra transmission, please call [phone] and press 3 to speak to a nurse BEFORE transmitting. This ensures that the Device Clinic staff is aware of the reason you are sending a transmission, and can follow-up with you after it has been reviewed.    We will be checking your implanted device from home (remotely) every three months unless otherwise instructed. We will need to see you in the clinic at least once a year. You may need to be seen in the clinic sooner depending on the results of your check.    Please be aware:    The follow-up schedule is like a Physician prescription.    Your remote monitor is paired to your specific implanted device.      Sincerely,    [facility]

## 2021-06-19 NOTE — LETTER
Letter by Belen Rodriguez at      Author: Belen Rodriguez Service: -- Author Type: --    Filed:  Encounter Date: 6/6/2019 Status: (Other)         Anthony [address]  [facility] [address]      June 6, 2019      Dear Mr. Tiwari,    RE: Remote Results    We are writing to you regarding your recent Remote ICD check from home. Your transmission was received successfully. Battery status is satisfactory at this time.     Your results are within normal limits.    Your next device appointment will be a clinic visit.  Please call in July to schedule.      To schedule or reschedule, please call [phone] and press 1.    NOTE: If you would like to do an extra transmission, please call [phone] and press 3 to speak to a nurse BEFORE transmitting. This ensures that the Device Clinic staff is aware of the reason you are sending a transmission, and can follow-up with you after it has been reviewed.    We will be checking your implanted device from home (remotely) every three months unless otherwise instructed. We will need to see you in the clinic at least once a year. You may need to be seen in the clinic sooner depending on the results of your check.    Please be aware:    The follow-up schedule is like a Physician prescription.    Your remote monitor is paired to your specific implanted device.      Sincerely,    [facility]

## 2021-06-19 NOTE — LETTER
Letter by García Masters MD at      Author: García Masters MD Service: -- Author Type: --    Filed:  Encounter Date: 8/28/2019 Status: (Other)         Anthony Tiwari  [address] [place] [address]      August 28, 2019      Dear Anthony,    This letter is to remind you that you will be due for your follow up appointment with Dr. García Masters  . To help ensure you are in the best health possible, a regular follow-up with your cardiologist is essential.     Please call our Patient Scheduling Line at [phone] to schedule your appointment at your earliest convenience.  If you have recently scheduled an appointment, please disregard this letter.    We look forward to seeing you again. As always, we are available at the number  above for any questions or concerns you may have.      Sincerely,     The Physicians and Staff of [facility]

## 2021-06-20 NOTE — LETTER
Letter by Belen Rodriguez at      Author: Belen Rodriguez Service: -- Author Type: --    Filed:  Encounter Date: 8/25/2020 Status: (Other)         Anthony Tiwari  [address]  [facility] [address]      August 25, 2020      Dear Mr. Tiwari,    RE: Remote Results    We are writing to you regarding your recent Remote ICD check from home. Your transmission was received successfully. Battery status is satisfactory at this time.     Your results are within normal limits.    Your next device appointment will be a clinic visit.  Please call in October to schedule.      To schedule or reschedule, please call [phone] and press 1.    NOTE: If you would like to do an extra transmission, please call [phone] and press 3 to speak to a nurse BEFORE transmitting. This ensures that the Device Clinic staff is aware of the reason you are sending a transmission, and can follow-up with you after it has been reviewed.    We will be checking your implanted device from home (remotely) every three months unless otherwise instructed. We will need to see you in the clinic at least once a year. You may need to be seen in the clinic sooner depending on the results of your check.    Please be aware:    The follow-up schedule is like a Physician prescription.    Your remote monitor is paired to your specific implanted device.      Sincerely,    [facility]

## 2021-06-20 NOTE — LETTER
Letter by Mira Cornejo RDCS at      Author: Mira Cornejo RDCS Service: -- Author Type: --    Filed:  Encounter Date: 2/20/2020 Status: (Other)         Anthony Tiwari  [address] [place] [address]      February 20, 2020      Dear Mr. Tiwari,    RE: Remote Results    We are writing to you regarding your recent Remote ICD check from home. Your transmission was received successfully. Battery status is satisfactory at this time.     Your results are showing no significant changes.    Your next device appointment will be a remote check on May 21, 2020; this will occur automatically.    To schedule or reschedule, please call [phone] and press 1.    NOTE: If you would like to do an extra transmission, please call [phone] and press 3 to speak to a nurse BEFORE transmitting. This ensures that the Device Clinic staff is aware of the reason you are sending a transmission, and can follow-up with you after it has been reviewed.    We will be checking your implanted device from home (remotely) every three months unless otherwise instructed. We will need to see you in the clinic at least once a year. You may need to be seen in the clinic sooner depending on the results of your check.    Please be aware:    The follow-up schedule is like a Physician prescription.    Your remote monitor is paired to your specific implanted device.      Sincerely,    [facility]

## 2021-06-20 NOTE — LETTER
Letter by Mira Cornejo RDCS at      Author: Mira Cornejo RDCS Service: -- Author Type: --    Filed:  Encounter Date: 5/21/2020 Status: (Other)         Anthony Tiwari  [address] [place] [address]      May 21, 2020      Dear Mr. Tiwari,    RE: Remote Results    We are writing to you regarding your recent Remote ICD check from home. Your transmission was received successfully. Battery status is satisfactory at this time.     Your results are showing no significant changes.    Your next device appointment will be a remote check on August 25, 2020; this will occur automatically.    To schedule or reschedule, please call [phone] and press 1.    NOTE: If you would like to do an extra transmission, please call [phone] and press 3 to speak to a nurse BEFORE transmitting. This ensures that the Device Clinic staff is aware of the reason you are sending a transmission, and can follow-up with you after it has been reviewed.    We will be checking your implanted device from home (remotely) every three months unless otherwise instructed. We will need to see you in the clinic at least once a year. You may need to be seen in the clinic sooner depending on the results of your check.    Please be aware:    The follow-up schedule is like a Physician prescription.    Your remote monitor is paired to your specific implanted device.      Sincerely,    [facility]

## 2021-06-20 NOTE — PROGRESS NOTES
In clinic device check with Dr. Noriega.  Please see link for full device report.  Patient was informed of results and next follow up during today's visit.

## 2021-06-21 NOTE — LETTER
Letter by Belen Rodriguez at      Author: Belen Rodriguez Service: -- Author Type: --    Filed:  Encounter Date: 5/4/2021 Status: (Other)         Anthony Tiwari  [address] [place] [address]      May 4, 2021      Dear Mr. Tiwari,    RE: Remote Results    We are writing to you regarding your recent Remote ICD check from home. Your transmission was received successfully. Battery status is satisfactory at this time.     Your results are within normal limits.    Your next device appointment will be a remote check on August 12, 2021; this will occur automatically.    To schedule or reschedule, please call [phone] and press 1.    NOTE: If you would like to do an extra transmission, please call [phone] and press 3 to speak to a nurse BEFORE transmitting. This ensures that the Device Clinic staff is aware of the reason you are sending a transmission, and can follow-up with you after it has been reviewed.    We will be checking your implanted device from home (remotely) every three months unless otherwise instructed. We will need to see you in the clinic at least once a year. You may need to be seen in the clinic sooner depending on the results of your check.    Please be aware:    The follow-up schedule is like a Physician prescription.    Your remote monitor is paired to your specific implanted device.      Sincerely,    [facility] Device Clinic

## 2021-06-24 NOTE — PROGRESS NOTES
Assessment/Plan:        Diagnoses and all orders for this visit:    Calculus of ureter  -     Urinalysis Macroscopic  -     Ureteroscopy Education    Bladder stones  -     Patient Stated Goal: Know what to expect after surgery  -     Ureteroscopy Education  -     Stent Education    Duplicated right renal collecting system  -     Ureteroscopy Education    Other hydronephrosis    Other orders  -     insulin NPH-insulin regular (NOVOLIN 70-30) 100 unit/mL (70-30) injection; Inject under the skin 2 (two) times a day before meals.  -     Verify informed consent; Standing  -     Diet NPO; Standing  -     Place sequential compression device; Standing  -     XR Retrograde Pyelogram W or WO KUB Intraoperative; Standing  -     levoFLOXacin 250 mg/50 mL IVPB 250 mg (LEVAQUIN)  -     ketorolac injection 15 mg (TORADOL)  -     acetaminophen tablet 1,000 mg (TYLENOL)  -     sterile water IR irrigation solution 3,000 mL      Stone Management Plan  [facility] 3/12/2019   Urinary Tract Infection No suspicion of infection   Renal Colic Asymptomatic at this time   Renal Failure No suspicion of renal failure   Current CT date 3/6/2019   Right sided stones? Yes   R Hydronephrosis Mild   R Stone Event New event   Diagnosis date 3/6/2019   Initial location of primary symptomatic stone Distal   Initial GSD of primary symptomatic stone 10   R Current Plan Clear   Clear rationale Poor prognosis   Left sided stones? No   L Stone Event No current event         Subjective:      HPI  Mr. Anthony Tiwari is a 83 y.o.  male returning to the [facility] following recent [facility] visit for urolithiasis.    He is a first time stone former. He has identified non-modifiable risk factor of multiple stones at presentation.    He has history of non-Hodgkins lymphomas s/p chemotherapy x 3, last in 2017.    He had acute onset right abdominal pain, while walking 6 days ago. The pain was intermittent but worsened in  severity, reaching 6/10. It radiated into the right flank with no associated alleviating or aggravating factors. He had associated symptoms of hesitancy with urination and nausea. No similar pain events in the past. No history of previous stones. Evaluation in ER 3/6/19 was notable for CT reporting numerous bladder stones and right hydornephrosis of duplicated collecting system, unable to excluded a UVJ stone. Labs were notable for chronic renal injury with creatinnie 1.88, K+ 5, mild leukocytosis with no acute concern of infection. He was sent home with zofran.     He has been feeling back to baseline since ER visit. He has no current pain or complaints with his voiding. He denies any obstructive voiding symptoms. He gets up 1-2 x night to urinate.    CT scan from 3/6/19 is personally reviewed and demonstrates several bladder stones within posterior bladder. There are a couple more dominant calcifications near right UVJ, unable to verify if these are within ureter. In addition, there is partial vs complete right duplicated collecting system with mild hydronephrosis. No left sided stones.    Labs from presentation include urinalysis with hematuria, acidic pH (5), mild leukocytosis (WBC 12.5) and elevated creatinine with hx of CKD (1.88), K+ 5.    PLAN    82 yo diabetic M with hx of cardiomyopathy s/p ICD and non-Hodgkin's lymphoma s/p chemo. Recent acute right renal colic (now resolved) found to have multiple bladder stones and dilated duplicated right collecting system with probable UVJ stone (s).    Will proceed with surgical clearance of stones via cystolitholapaxy, possible right ureteroscopy, laser lithotripsy and stent insertion. Risks and benefits were detailed of ureteroscopic stone clearance including potential issues of urinary or systemic infection, ureteral injury, inaccessible stone, incomplete stone clearance, multiple surgeries, and stent related symptoms of urgency, frequency and hematuria.  Preoperative clearance with PCP has been requested.     Patient also seen and examined by LILIANE Hunt   A 12 point comprehensive review of systems is negative except for HPI.    Past Medical History:   Diagnosis Date     Bladder stone      Cardiomyopathy      CKD (chronic kidney disease) stage 3, GFR 30-59 ml/min (H)      Dyslipidemia, goal LDL below 100      Essential hypertension      Hypertriglyceridemia     As high as 980 mg/dl in past     ICD (implantable cardioverter-defibrillator) in place      Lymphoma (H) 2015    see Dr. Cody at [facility], getting chemo again in 2017     Nonocclusive coronary atherosclerosis of native coronary artery     nonobstuctive by cor angio       SIRIA on CPAP 2013    he sees someone at [facility]     Statin intolerance 2/21/2019    History of elevated CPK on both simvastatin and atorvastatin.     Past Surgical History:   Procedure Laterality Date     CARDIAC DEFIBRILLATOR PLACEMENT  07/24/2013     CATARACT EXTRACTION W/ INTRAOCULAR LENS  IMPLANT, BILATERAL  05/2018     TOTAL HIP ARTHROPLASTY Right 2012     Current Outpatient Medications   Medication Sig Dispense Refill     acetaminophen (TYLENOL) 500 MG tablet Take 2 tablets (1,000 mg total) by mouth 4 (four) times a day for 7 days. 56 tablet 0     aspirin 81 MG EC tablet Take 81 mg by mouth bedtime.       dimenhyDRINATE (DRAMAMINE) 50 MG tablet Take 1 tablet (50 mg total) by mouth at bedtime for 7 days. 7 tablet 0     fenofibrate (TRICOR) 145 MG tablet Take 145 mg by mouth daily.       insulin NPH-insulin regular (NOVOLIN 70-30) 100 unit/mL (70-30) injection Inject under the skin 2 (two) times a day before meals.       lisinopril (PRINIVIL,ZESTRIL) 2.5 MG tablet Take 2.5 mg by mouth bedtime.        metFORMIN (GLUCOPHAGE) 500 MG tablet Take 500 mg by mouth 2 (two) times a day.   2     metoprolol (TOPROL-XL) 200 MG 24 hr tablet Take 200 mg by mouth bedtime.        omeprazole (PRILOSEC OTC) 20 MG tablet Take 20  mg by mouth bedtime.        spironolactone (ALDACTONE) 25 MG tablet Take 12.5 mg by mouth bedtime.        dimenhyDRINATE (DRAMAMINE) 50 MG tablet Take 1 tablet (50 mg total) by mouth 4 (four) times a day as needed. 28 tablet 0     ibuprofen (ADVIL,MOTRIN) 200 MG tablet Take 2 tablets (400 mg total) by mouth 4 (four) times a day for 7 days. 56 tablet 0     ondansetron (ZOFRAN ODT) 4 MG disintegrating tablet Take 1 tablet (4 mg total) by mouth every 8 (eight) hours as needed for nausea. 12 tablet 0     No current facility-administered medications for this visit.      Allergies   Allergen Reactions     Atorvastatin Other (See Comments)     Elevated CPK     Simvastatin Other (See Comments)     Elevated CPK     Social History     Socioeconomic History     Marital status:      Spouse name: Rebeka     Number of children: 3     Years of education: Not on file     Highest education level: Not on file   Occupational History     Occupation: Retired     Employer: RETIRED   Social Needs     Financial resource strain: Not on file     Food insecurity:     Worry: Not on file     Inability: Not on file     Transportation needs:     Medical: Not on file     Non-medical: Not on file   Tobacco Use     Smoking status: Never Smoker     Smokeless tobacco: Never Used   Substance and Sexual Activity     Alcohol use: No     Drug use: No     Sexual activity: Not on file   Lifestyle     Physical activity:     Days per week: Not on file     Minutes per session: Not on file     Stress: Not on file   Relationships     Social connections:     Talks on phone: Not on file     Gets together: Not on file     Attends [employer] service: Not on file     Active member of club or organization: Not on file     Attends meetings of clubs or organizations: Not on file     Relationship status: Not on file     Intimate partner violence:     Fear of current or ex partner: Not on file     Emotionally abused: Not on file     Physically abused: Not on file      Forced sexual activity: Not on file   Other Topics Concern     Not on file   Social History Narrative    He walks for an hour most days and still takes care of his lawn and snow removal, though his grandsons are helping more with the lawn in 2018. 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     Family History   Problem Relation Age of Onset     Rheumatic Heart Disease Brother         half brother     Dementia Brother      Stroke Father      Rheumatic Heart Disease Sister      Sudden death Sister 67        no autopsy     CABG Sister 81     No Medical Problems Daughter      No Medical Problems Daughter      No Medical Problems Daughter      Urolithiasis Neg Hx      Clotting disorder Neg Hx      Gout Neg Hx      Objective:      Physical Exam  Vitals:    03/12/19 1043   BP: 119/58   Pulse: 60   Temp: 97.4  F (36.3  C)     General - well developed, well nourished, appropriate for age. Appears no distress at this time   Heart - regular rate and rhythm, no murmur  Respiratory - normal effort, clear to auscultation, good air entry without adventitious noises  Abdomen - slender soft, non-tender, no hepatosplenomegaly, no masses.   - no flank tenderness, no suprapubic tenderness, kidney and bladder non-palpable  MSK - normal spinal curvature. no spinal tenderness. normal gait. muscular strength intact.  Neurology - cranial nerves II-XII grossly intact, normal sensation, no unsteadiness  Skin - intact, no bruising, no gouty tophi  Psych - oriented to time, place, and person, normal mood and affect.      Labs   Urinalysis POC (Office):  Nitrite, UA   Date Value Ref Range Status   03/12/2019 Negative Negative Final   03/06/2019 Negative Negative Final   11/05/2016 Negative Negative Final       Lab Urinalysis:  Blood, UA   Date Value Ref Range Status   03/12/2019 Moderate (!) Negative Final   03/06/2019 Large (!) Negative Final   11/05/2016 Negative Negative Final     Nitrite, UA   Date Value Ref Range  Status   03/12/2019 Negative Negative Final   03/06/2019 Negative Negative Final   11/05/2016 Negative Negative Final     Leukocytes, UA   Date Value Ref Range Status   03/12/2019 Negative Negative Final   03/06/2019 Negative Negative Final   11/05/2016 Negative Negative Final     pH, UA   Date Value Ref Range Status   03/12/2019 5.5 5.0 - 8.0 Final   03/06/2019 5.0 4.5 - 8.0 Final   11/05/2016 5.0 4.5 - 8.0 Final    and Acute Labs   CBC   WBC   Date Value Ref Range Status   03/06/2019 12.8 (H) 4.0 - 11.0 thou/uL Final   04/17/2017 6.4 4.0 - 11.0 thou/uL Final   11/06/2016 5.5 4.0 - 11.0 thou/uL Final     Hemoglobin   Date Value Ref Range Status   03/06/2019 14.2 14.0 - 18.0 g/dL Final   04/17/2017 13.8 (L) 14.0 - 18.0 g/dL Final   11/06/2016 13.2 (L) 14.0 - 18.0 g/dL Final     Platelets   Date Value Ref Range Status   03/06/2019 326 140 - 440 thou/uL Final   04/17/2017 277 140 - 440 thou/uL Final   11/06/2016 208 140 - 440 thou/uL Final   , Renal Panel  [facility]  Creatinine   Date Value Ref Range Status   03/06/2019 1.88 (H) 0.70 - 1.30 mg/dL Final   12/10/2018 1.46 (H) 0.70 - 1.30 mg/dL Final   09/12/2018 1.91 (H) 0.70 - 1.30 mg/dL Final     Potassium   Date Value Ref Range Status   03/06/2019 5.0 3.5 - 5.0 mmol/L Final   12/10/2018 4.9 3.5 - 5.0 mmol/L Final   09/12/2018 4.7 3.5 - 5.0 mmol/L Final     Calcium   Date Value Ref Range Status   03/06/2019 9.3 8.5 - 10.5 mg/dL Final   12/10/2018 9.5 8.5 - 10.5 mg/dL Final   09/12/2018 9.9 8.5 - 10.5 mg/dL Final    and Urine Culture  No results found for: CULTURE

## 2021-06-24 NOTE — PATIENT INSTRUCTIONS - HE
Patient Stated Goal: Know what to expect after surgery  Ureteroscopy    Ureteroscopy is a procedure which is done for clearance of stones from the ureter, kidney or both. There are no incisions involved. The procedure involves your surgeon placing a small scope into your urethra. This is the opening where urine leaves your body.  The surgeon watches as they carefully guide the scope to the stone(s).  Modern flexible ureteroscopes can be used to reach virtually any location within the urinary tract.     The size, shape and location of the stone determines how best to treat the stone(s).  Whenever possible, stones are removed in one piece.  Larger stones need to be broken using a laser before removing in smaller pieces.  The goal is to remove all stones and stone fragments from that side of the body in a single treatment.  Complete stone clearance is an important step to prevent future kidney stone episodes.    Surgery:    Same day outpatient procedure    30-60 minutes    Procedure done in hospital surgical suite    General anesthesia (you will be asleep during the procedure)     Antibiotic prior to surgery to prevent infection    Physician will visit with you and respond to any questions or concerns and consent will be signed prior to going to the operating room    Risks:    Infection - Preoperative antibiotics should prevent new infections but it is possible that unanticipated bacteria may be introduced at time of surgery or that the stones were actually chronically infected before surgery      Injury - The ureter may be injured during this procedure.  This is most likely to happen if the ureter was very inflamed before surgery or if a stone is very tightly impacted.  The surgeon will not aggressively treat a stone if this creates a risk of injury.        Inaccessible Stones -A single procedure is effective in 95% of cases, but if your ureter is very narrow or your kidney stone is very impacted, a stent will be  placed and the procedure stopped.  In 1-2 weeks after the ureter has relaxed, the patient will be brought back to surgery and the procedure can be safely performed.      Incomplete stone clearance -Occasionally stone or stone fragments may not be completely cleared.  These may pass on their own, which may cause discomfort.  Our goal is to remove all possible stones and fragments.    Stent:      An internal soft tube will be placed between the kidney and the bladder while in surgery (after the stone is cleared). The stent will keep the kidney draining.    What should I expect?     It is common for a stent to cause some irritation and discomfort.   You may have:      The need to urinate suddenly     The need to urinate often     Pain during urination     A dull backache, which may get worse during urination     Blood stained urine (like fruit punch) and occasional small clots    It s important to remember the stent is necessary and only temporary. To feel more comfortable:      Drink more than you normally would but you do not have to constantly  flush your kidneys     Limiting your activity may decrease irritation or bleeding    Ibuprofen - 2 tablets every 6-8 hours     Use pain medications as directed.    When is the stent removed?    Most stents are removed within 5 days to 2 weeks after a procedure.     How is the stent removed?     Your stent will be removed in the [facility] with a small telescope and a grasping tool.  It usually takes less than 1 minute to remove the stent.    What should I expect after the stent is removed?     You should feel normal by the next day    Some patients find:    An increase in back pain about an hour after the stent is removed as the kidney fills up with urine before it starts to empty.  It can be as uncomfortable as your initial stone episode.  Taking pain medications before stent removal may be helpful, but you would need someone else to drive you to and from your  appointment.    Bladder symptoms usually disappear by the next morning.    Small amounts of blood in the urine may be seen occasionally for up to a week.    Diet:      After surgery, there are no dietary restrictions - Drink to thirst, there is no need to increase intake of fluids, as this may increase nausea symptoms. Try to eat smaller, more frequent snacks, instead of large meals.    Activity:    Many people return to work within 1-2 days. Fatigue is normal for a couple of weeks following surgery. With increased activity you may experience more discomfort and you may notice more blood in your urine.      Post-Operative Symptom Control    While you recover from your procedure, you can take steps to ease your recovery.    Medications that prevent further episodes of severe pain and help stones pass: Take these as prescribed on a regular basis even if you are NOT in pain      Ibuprofen (Advil or Motrin) - Is available over the counter Take 2 (200mg) tablets every 6 hours until the stone passes.  o prevents spasm of the ureter.    o Decreases pain      Dramamine - (drowsy version, non-generic formulation) Is available over the counter and decreases spasm of the ureter.  Take 50mg at bedtime every night until the stone passes. In addition, take every 6 hours as needed.  Dramamine:  o Decreases nausea  o Decreases acute pain  o Decreases recurrence of pain for next 24 hours  o Will help you sleep        *This medication will cause increased drowsiness, do not drive or operate machinery for 6 hours      Flomax- Studies show that Flomax decreases irritation from stents.   o Take every day with food until stone passes even if you do not have pain  o Flomax does not relieve pain.        *This medication may cause nasal congestion or light-headedness      Detrol ( Tolterodine) - After surgery Detrol may decrease stent irritation and pelvic pain  o Take as prescribed     *This medication may cause dry mouth, constipation or  blurry vision. Stop medication if unable to urinate.    Medication that are taken as needed to manage break through symptoms: Take these ONLY as required and hopefully not at all      Narcotics (Percocet, Vicodin, Dilaudid)- take as prescribed for severe pain unrelieved by ibuprofen and dramamine  o Take as prescribed for severe pain  o Narcotics have significant side effects and only  cover-up  pain. They have no effect on cause of pain.  o Common side effects:  - Confusion, disorientation and sedation - DO NOT DRIVE OR OPERATE MACHINERY WITHIN 24 HOURS  - Nausea - take Dramamine or Zofran  or Haldol to help control  - Constipation  - Sleep disturbances      Ondansetron (Zofran)-  o Take as prescribed  o Reserve for severe nausea  o May cause constipation, start over the counter Miralax if needed to treat this    Haldol-  o Take as prescribed  o Reserve for severe nausea    Warning Signs/Symptoms - Please call the [facility] 24 hours a day at [phone] IMMEDIATELY if you experience any of these:    Fever greater than 100.1     Chills    Pain NOT CONTROLLED by pain medications    Heavy bleeding or large clots in urine (small clots can be normal)    Persistent nausea and/or vomiting    Post-Operative Follow up:    The stone(s) will be sent from surgery to a lab for composition analysis.  These results are usually available before a one month post-operative visit.  If you had laser treatment to break up your stone, you will usually be scheduled for a low dose CT scan prior to your one month appointment.  This scan allows your surgeon to confirm that all stone fragments were cleared at time of surgery and that there have been no complications.  These results along with possible labs and urine studies will help us develop an individualized plan to prevent new stones from forming and keep existing stones from enlarging.  This visit is usually scheduled about 1 month after your original surgery.    The  [facility] can respond to your questions or concerns 24 hours a day at [phone].

## 2021-06-24 NOTE — PATIENT INSTRUCTIONS - HE
Anthony,    It was a pleasure to see you today at the [facility].     Please try stopping digoxin; call us if this makes you feel unwell.    You will see Dr. Noriega in six months after new blood tests and an echocardiogram.    Please call us if you have any questions or concerns about your heart.      Josh Noriega M.D.

## 2021-06-25 NOTE — PROGRESS NOTES
Progress Notes by Meng Noriega MD at 5/19/2017 11:30 AM     Author: Meng Noriega MD Service: -- Author Type: Physician    Filed: 5/19/2017 12:07 PM Encounter Date: 5/19/2017 Status: Signed    : Meng Noriega MD (Physician)           Click to link to [facility]     [facility] Clinic Note      Assessment:    1. Cardiomyopathy  BNP(B-type Natriuretic Peptide)    Echo Complete   2. Elevated CK  CK       Plan:    1.  Draw CPK today; we will call him with results.  2.  Return to see Dr. Noriega in 1 year after new echocardiogram and BNP level.    An After Visit Summary was printed and given to the patient.    Subjective:    Anthony Tiwari returned for a planned annual follow up visit.  His wife, Rebeka, accompanied him to the visit.    He has done well over the last year.  He continues to do his own lawn work and snow removal.  He walks for an hour most days except recently, when he has been fatigued from chemotherapy which he is receiving for lymphoma.    His complete system review is notable for anginal nocturia, but he has no other complaints.    Despite his cardiomyopathy, he has not required any diuretic therapy.    Past Medical History:    Patient Active Problem List   Diagnosis   ? Non-Hodgkin's Lymphoma   ? Anxiety   ? CKD (chronic kidney disease) stage 3, GFR 30-59 ml/min   ? Dyslipidemia, goal LDL below 70   ? Cardiomyopathy   ? SIRIA on CPAP   ? Essential hypertension   ? Elevated CK   ? Diabetes mellitus type 2 in nonobese       Past Medical History:   Diagnosis Date   ? Cardiomyopathy    ? CKD (chronic kidney disease) stage 3, GFR 30-59 ml/min    ? Essential hypertension    ? ICD (implantable cardioverter-defibrillator) in place    ? Lymphoma 2015    see Dr. Cody at [facility], getting chemo again in 2017   ? Nonocclusive coronary atherosclerosis of native coronary artery     nonobstuctive by cor angio     ? SIRIA on CPAP 2013    he sees someone at [facility]        Past Surgical History:   Procedure Laterality Date   ? CARDIAC DEFIBRILLATOR PLACEMENT  07/24/2013   ? TOTAL HIP ARTHROPLASTY Right 2012        reports that he has never smoked. He has never used smokeless tobacco. He reports that he does not drink alcohol or use illicit drugs.    Family History   Problem Relation Age of Onset   ? Rheumatic Heart Disease Brother      half brother   ? Dementia Brother    ? Stroke Father    ? Rheumatic Heart Disease Sister    ? Sudden death Sister 67     no autopsy   ? CABG Sister 81   ? No Medical Problems Daughter    ? No Medical Problems Daughter    ? No Medical Problems Daughter        Outpatient Encounter Prescriptions as of 5/19/2017   Medication Sig Dispense Refill   ? aspirin 81 MG EC tablet Take 81 mg by mouth bedtime.     ? digoxin (LANOXIN) 125 mcg tablet Take 125 mcg by mouth bedtime.      ? lisinopril (PRINIVIL,ZESTRIL) 2.5 MG tablet Take 2.5 mg by mouth bedtime.      ? metFORMIN (GLUCOPHAGE) 500 MG tablet Take 500 mg by mouth 2 (two) times a day.   2   ? metoprolol (TOPROL-XL) 200 MG 24 hr tablet Take 200 mg by mouth bedtime.      ? omeprazole (PRILOSEC OTC) 20 MG tablet Take 20 mg by mouth bedtime.      ? simvastatin (ZOCOR) 40 MG tablet Take 40 mg by mouth bedtime.      ? spironolactone (ALDACTONE) 25 MG tablet Take 12.5 mg by mouth bedtime.      ? amoxicillin (AMOXIL) 500 MG tablet Take 2,000 mg by mouth as needed. 1 hour before dental appointment      ? nitroglycerin (NITROSTAT) 0.4 MG SL tablet Place 1 tablet (0.4 mg total) under the tongue every 5 (five) minutes as needed for chest pain. 25 tablet 0     No facility-administered encounter medications on file as of 5/19/2017.        Review of Systems:     General: WNL  Eyes: WNL  Ears/Nose/Throat: WNL  Lungs: WNL  Heart: WNL  Stomach: WNL  Bladder: Frequent Urination at Night  Muscle/Joints: WNL  Skin: WNL  Nervous System: WNL  Mental Health: WNL     Blood: WNL    Objective:     /56 (Patient Site: Right  "Arm, Patient Position: Sitting, Cuff Size: Adult Regular)  Pulse 60  Resp 16  Ht 5' 9\" (1.753 m)  Wt 180 lb (81.6 kg)  BMI 26.58 kg/m2  Wt Readings from Last 5 Encounters:   17 180 lb (81.6 kg)   05/10/17 178 lb (80.7 kg)   16 178 lb 6.4 oz (80.9 kg)   16 182 lb 12.8 oz (82.9 kg)   05/08/15 179 lb (81.2 kg)        Physical Exam:    GENERAL APPEARANCE: alert, no apparent distress  EYES: no scleral icterus  NECK: no carotid bruits or adenopathy, jugular venous pressure is less than 5 cm  CHEST: symmetric, the lungs are clear to auscultation  CARDIOVASCULAR: regular rhythm without murmur or gallop  EXTREMITIES: no cyanosis, clubbing or edema  SKIN: no xanthelasma  NEUROLOGIC: normal gait and coordination  PSYCHIATRIC: mood and affect are normal    Cardiac Testing:    Echocardiogram:    Summary   1. The left ventricle is mild to moderately enlarged. Left ventricular systolic performance is moderately reduced. The ejection fraction is estimated to be 30-35%. There is moderate global reduction in left ventricular systolic performance.   2. There is trace aortic insufficiency.   3. Normal right ventricular size and systolic performance.   4. There is mild left atrial enlargement.      When compared to the prior real-time echocardiogram dated 18 May 2016, there has been little appreciable interval change.  Specifically, it is this readers impression that left ventricular systolic performance appears similar on both studies.         Results for orders placed during the hospital encounter of 16   NM Pharmacologic Stress Test [MXU853] 2016    [facility]  Pharmacological Stress Test Report    Patient: Anthony Tiwari   MRN: [id number]  : 1935  Primary Care Provider: Hemant LORD MD  Ordering Physician: Mario Melton MD  Indication:        STRESS SUMMARY: 0.4 mg of intravenous Lexiscan was administered over 15   seconds under the " supervision of Dr. Paresh Teran. No cardiac symptoms   were reported. The stress electrocardiogram was positive for inducible   ischemia with 1.0-1.4 mm of horizontal ST segment depression.    TECHNICAL COMMENTS: Nuclear imaging was accomplished utilizing 4.36 mCi of     thallium-201 injected at rest and 29.4 mCi of 99m technetium sestamibi   injected at   the completion of Lexiscan infusion. The  images are   satisfactory.     FINDINGS: The Lexiscan stress and rest images demonstrate normal left   ventricular   size and tracer uptake. The gated images reveal moderately reduced global   systolic performance. The measured left ventricular ejection fraction is   41%.     CONCLUSION:   1. Lexiscan stress nuclear study is negative for inducible myocardial   Ischemia.  2. Moderately reduced left ventricular systolic function with an ejection   fraction of 41%.     COMMENTS:   Comparison with the images of the exam of 5/9/13 demonstrates improvement   in left ventricular systolic function on the current study.    The patient is at low risk of future cardiac ischemic events based on   perfusion imaging.      Petrona Jaime MD  11/8/2016 2:41 PM              Imaging:    No results found.    Lab Results:    Lab Results   Component Value Date    CREATININE 1.22 04/10/2017    BUN 17 04/10/2017     (L) 04/10/2017    K 4.8 04/10/2017    CL 97 (L) 04/10/2017    CO2 28 04/10/2017     BNP (pg/mL)   Date Value   05/10/2017 93 (H)   11/05/2016 111 (H)   05/27/2016 58     Creatinine (mg/dL)   Date Value   04/10/2017 1.22   11/15/2016 1.20   11/07/2016 1.03   11/06/2016 1.04     Magnesium (mg/dL)   Date Value   01/30/2014 2.3   10/25/2013 2.4   05/06/2013 2.3     Lab Results   Component Value Date    WBC 6.4 04/17/2017    HGB 13.8 (L) 04/17/2017    HCT 41.4 04/17/2017    MCV 93 04/17/2017     04/17/2017           Much or all of the text in this note was generated through the use of the Dragon  Dictate voice-to-text software. Errors in spelling or words which seem out of context are unintentional. Sound alike errors, in particular, may have escaped editing.

## 2021-06-25 NOTE — ANESTHESIA POSTPROCEDURE EVALUATION
Patient: Anthony Tiwari  CYSTOSCOPY RIGHT URETEROSCOPY LASER LITHOTRIPSY, CYSTOLITHOLAPAXY  Anesthesia type: general    Patient location: PACU  Last vitals:   Vitals:    03/19/19 1215   BP: 136/60   Pulse: 60   Resp: 18   Temp:    SpO2: 96%     Post vital signs: stable  Level of consciousness: awake and responds to simple questions  Post-anesthesia pain: pain controlled  Post-anesthesia nausea and vomiting: no  Pulmonary: unassisted, return to baseline  Cardiovascular: stable and blood pressure at baseline  Hydration: adequate  Anesthetic events: no    QCDR Measures:  ASA# 11 - Kat-op Cardiac Arrest: ASA11B - Patient did NOT experience unanticipated cardiac arrest  ASA# 12 - Kat-op Mortality Rate: ASA12B - Patient did NOT die  ASA# 13 - PACU Re-Intubation Rate: ASA13B - Patient did NOT require a new airway mgmt  ASA# 10 - Composite Anes Safety: ASA10A - No serious adverse event    Additional Notes:

## 2021-06-25 NOTE — ANESTHESIA CARE TRANSFER NOTE
Last vitals:   Vitals:    03/19/19 1022   BP: 118/86   Pulse: 60   Resp: 12   Temp: 37.1  C (98.7  F)   SpO2: 100%     Patient's level of consciousness is drowsy  Spontaneous respirations: yes  Maintains airway independently: yes  Dentition unchanged: yes  Oropharynx: oropharynx clear of all foreign objects    QCDR Measures:  ASA# 20 - Surgical Safety Checklist: WHO surgical safety checklist completed prior to induction    PQRS# 430 - Adult PONV Prevention: 4558F - Pt received => 2 anti-emetic agents (different classes) preop & intraop  ASA# 8 - Peds PONV Prevention: NA - Not pediatric patient, not GA or 2 or more risk factors NOT present  PQRS# 424 - Kat-op Temp Management: 4559F - At least one body temp DOCUMENTED => 35.5C or 95.9F within required timeframe  PQRS# 426 - PACU Transfer Protocol: - Transfer of care checklist used  ASA# 14 - Acute Post-op Pain: ASA14B - Patient did NOT experience pain >= 7 out of 10

## 2021-06-26 NOTE — PROGRESS NOTES
Progress Notes by Meng Noriega MD at 8/30/2018 10:10 AM     Author: Meng Noriega MD Service: -- Author Type: Physician    Filed: 8/30/2018  9:50 AM Encounter Date: 8/30/2018 Status: Signed    : Meng Noriega MD (Physician)           Click to link to [facility]     [facility] Clinic Note      Assessment:    1. Idiopathic cardiomyopathy (H)  Basic metabolic panel   2. CKD (chronic kidney disease) stage 3, GFR 30-59 ml/min  Basic metabolic panel   3. Dyslipidemia, goal LDL below 70  atorvastatin (LIPITOR) 20 MG tablet   4. Elevated CK  CK       Plan:    1. Discontinue simvastatin given the chronically elevated CPK enzyme and the potential for interaction with increased risk of myositis and renal failure; we will substitute atorvastatin 20 mg orally daily.  Anthony can recheck his lipid profile in 3-6 months with Dr. Hemant Blanco.  2. We will call him with the results of the laboratory testing drawn today.  3. Return to see Dr. Noriega in 1 year.    An After Visit Summary was printed and given to the patient.    Subjective:    Anthony Tiwari returned for a planned annual follow up visit.    Overall, he feels he has done well.  His only reported symptom is nocturia.  He does not mow his lawn as much as he used to and when it is hot 1 of his 2 grandsons helps him.  He still tries to walk for an hour every day.  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    Anthony does not report any difficulties in taking his cardiovascular medications.  He was not aware of the fact that his creatinine is higher than 2 years ago.    Past Medical History:    Patient Active Problem List   Diagnosis   ? Non-Hodgkin's Lymphoma   ? Anxiety   ? CKD (chronic kidney disease) stage 3, GFR 30-59 ml/min   ? Dyslipidemia, goal LDL below 70   ? Idiopathic cardiomyopathy (H)   ? SIRIA on CPAP   ? Essential hypertension   ? Elevated CK   ? Diabetes mellitus type 2 in nonobese (H)   ? ICD  (implantable cardioverter-defibrillator) in place   ? Hypertriglyceridemia       Past Medical History:   Diagnosis Date   ? Cardiomyopathy    ? CKD (chronic kidney disease) stage 3, GFR 30-59 ml/min    ? Essential hypertension    ? Hypertriglyceridemia     As high as 980 mg/dl in past   ? ICD (implantable cardioverter-defibrillator) in place    ? Lymphoma (H) 2015    see Dr. Cody at [facility], getting chemo again in 2017   ? Nonocclusive coronary atherosclerosis of native coronary artery     nonobstuctive by cor angio     ? SIRIA on CPAP 2013    he sees someone at [facility]       Past Surgical History:   Procedure Laterality Date   ? CARDIAC DEFIBRILLATOR PLACEMENT  07/24/2013   ? CATARACT EXTRACTION W/ INTRAOCULAR LENS  IMPLANT, BILATERAL  05/2018   ? TOTAL HIP ARTHROPLASTY Right 2012        reports that he has never smoked. He has never used smokeless tobacco. He reports that he does not drink alcohol or use illicit drugs.    Family History   Problem Relation Age of Onset   ? Rheumatic Heart Disease Brother      half brother   ? Dementia Brother    ? Stroke Father    ? Rheumatic Heart Disease Sister    ? Sudden death Sister 67     no autopsy   ? CABG Sister 81   ? No Medical Problems Daughter    ? No Medical Problems Daughter    ? No Medical Problems Daughter        Outpatient Encounter Prescriptions as of 8/30/2018   Medication Sig Dispense Refill   ? amoxicillin (AMOXIL) 500 MG tablet Take 2,000 mg by mouth as needed. 1 hour before dental appointment      ? aspirin 81 MG EC tablet Take 81 mg by mouth bedtime.     ? digoxin (LANOXIN) 125 mcg tablet Take 1 tablet (125 mcg total) by mouth at bedtime. 30 tablet 1   ? fenofibrate (TRICOR) 145 MG tablet Take 145 mg by mouth daily.     ? lisinopril (PRINIVIL,ZESTRIL) 2.5 MG tablet Take 2.5 mg by mouth bedtime.      ? metFORMIN (GLUCOPHAGE) 500 MG tablet Take 500 mg by mouth 2 (two) times a day.   2   ? metoprolol (TOPROL-XL) 200 MG 24 hr tablet Take 200 mg by  "mouth bedtime.      ? nitroglycerin (NITROSTAT) 0.4 MG SL tablet Place 1 tablet (0.4 mg total) under the tongue every 5 (five) minutes as needed for chest pain. 25 tablet 0   ? omeprazole (PRILOSEC OTC) 20 MG tablet Take 20 mg by mouth bedtime.      ? spironolactone (ALDACTONE) 25 MG tablet Take 12.5 mg by mouth bedtime.      ? [DISCONTINUED] simvastatin (ZOCOR) 40 MG tablet Take 40 mg by mouth bedtime.      ? atorvastatin (LIPITOR) 20 MG tablet Take 1 tablet (20 mg total) by mouth daily. 30 tablet 3     No facility-administered encounter medications on file as of 8/30/2018.        Review of Systems:     General: WNL  Eyes: WNL  Ears/Nose/Throat: WNL  Lungs: WNL  Heart: WNL  Stomach: WNL  Bladder: Frequent Urination at Night  Muscle/Joints: WNL  Skin: WNL  Nervous System: WNL  Mental Health: WNL     Blood: WNL    Objective:     /58 (Patient Site: Left Arm, Patient Position: Sitting, Cuff Size: Adult Regular)  Pulse 60  Resp 12  Ht 5' 9\" (1.753 m)  Wt 184 lb (83.5 kg)  BMI 27.17 kg/m2  Wt Readings from Last 5 Encounters:   08/30/18 184 lb (83.5 kg)   08/20/18 179 lb (81.2 kg)   08/31/17 179 lb (81.2 kg)   05/19/17 180 lb (81.6 kg)   05/10/17 178 lb (80.7 kg)        Physical Exam:    GENERAL APPEARANCE: alert, no apparent distress  EYES: no scleral icterus  NECK: no carotid bruits or adenopathy, jugular venous pressure is less than 5 cm  CHEST: symmetric, the lungs are clear to auscultation  CARDIOVASCULAR: regular rhythm without murmur or gallop  EXTREMITIES: no cyanosis, clubbing or edema  SKIN: no xanthelasma  NEUROLOGIC: normal gait and coordination  PSYCHIATRIC: mood and affect are normal    Cardiac Testing:    Echocardiogram:   Results for orders placed during the hospital encounter of 08/20/18   Echo Complete [ECH10] 08/20/2018    Narrative   Left ventricle ejection fraction is mildly decreased. The calculated   left ventricular ejection fraction is 45% with global hypokinesis.    Grade 1 diastolic " dysfunction    Normal right ventricular size and systolic function.    Mild aortic regurgitation    When compared to the previous study dated 5/10/2017, there is   improvement in overall left ventricular function. No other interval   change.          Cardiac Catheterization:   Results for orders placed in visit on 08   CV Historical Imaging Procedure [CATH69] 2008    Narrative See Historical Hospital Medical Record for documentation          Results for orders placed during the hospital encounter of 16   NM Pharmacologic Stress Test [ZGC005] 2016    Narrative [facility] Nuclear Cardiology  Pharmacological Stress Test Report    Patient: Anthony Tiwari   MRN: [id number]  : 1935  Primary Care Provider: Hemant LORD MD  Ordering Physician: Mario Melton MD  Indication:        STRESS SUMMARY: 0.4 mg of intravenous Lexiscan was administered over 15   seconds under the supervision of Dr. Paresh Teran. No cardiac symptoms   were reported. The stress electrocardiogram was positive for inducible   ischemia with 1.0-1.4 mm of horizontal ST segment depression.    TECHNICAL COMMENTS: Nuclear imaging was accomplished utilizing 4.36 mCi of     thallium-201 injected at rest and 29.4 mCi of 99m technetium sestamibi   injected at   the completion of Lexiscan infusion. The  images are   satisfactory.     FINDINGS: The Lexiscan stress and rest images demonstrate normal left   ventricular   size and tracer uptake. The gated images reveal moderately reduced global   systolic performance. The measured left ventricular ejection fraction is   41%.     CONCLUSION:   1. Lexiscan stress nuclear study is negative for inducible myocardial   Ischemia.  2. Moderately reduced left ventricular systolic function with an ejection   fraction of 41%.     COMMENTS:   Comparison with the images of the exam of 13 demonstrates improvement   in left ventricular systolic function on the  current study.    The patient is at low risk of future cardiac ischemic events based on   perfusion imaging.      Petrona Jaime MD  11/8/2016 2:41 PM              Imaging:    No results found.    Lab Results:    Lab Results   Component Value Date    CREATININE 1.89 (H) 05/30/2018    BUN 34 (H) 05/30/2018     05/30/2018    K 4.9 05/30/2018     05/30/2018    CO2 24 05/30/2018     Lab Results   Component Value Date    CHOL 173 12/12/2017    TRIG 823 (H) 12/12/2017    HDL 28 (L) 12/12/2017    LDLDIRECT 57 12/12/2017     BNP (pg/mL)   Date Value   08/20/2018 67   05/10/2017 93 (H)   11/05/2016 111 (H)     Creatinine (mg/dL)   Date Value   05/30/2018 1.89 (H)   12/12/2017 1.66 (H)   04/10/2017 1.22   11/15/2016 1.20     Magnesium (mg/dL)   Date Value   01/30/2014 2.3   10/25/2013 2.4   05/06/2013 2.3     LDL Calculated (mg/dL)   Date Value   12/12/2017      Comment:     Invalid, Triglycerides >400   07/10/2017      Comment:     Invalid, Triglycerides >400   04/19/2017      Comment:     Invalid, Triglycerides >400     Lab Results   Component Value Date    WBC 6.4 04/17/2017    HGB 13.8 (L) 04/17/2017    HCT 41.4 04/17/2017    MCV 93 04/17/2017     04/17/2017           Much or all of the text in this note was generated through the use of the Dragon Dictate voice-to-text software. Errors in spelling or words which seem out of context are unintentional. Sound alike errors, in particular, may have escaped editing.

## 2021-06-27 NOTE — PROGRESS NOTES
Progress Notes by Meng Noriega MD at 2/21/2019  8:10 AM     Author: Meng Noriega MD Service: -- Author Type: Physician    Filed: 2/21/2019  8:36 AM Encounter Date: 2/21/2019 Status: Signed    : Meng Noriega MD (Physician)           Click to link to [facility]     [facility] Clinic Note      Assessment:    1. Idiopathic cardiomyopathy (H)  BNP(B-type Natriuretic Peptide)    Basic Metabolic Panel    Echo Complete   2. Statin intolerance         Plan:    1. Anthony would like to be on fewer medications, therefore, we will try stopping digoxin.  He will call us if this changes makes him feel unwell.  2. He has had significant CPK elevations with both simvastatin and atorvastatin, therefore, I feel he is statin intolerant.  Given that he only had nonocclusive coronary atherosclerosis on invasive angiography, and is already on aspirin and TriCor, I do not think he merits treatment with a PCSK9 inhibitor.  3. Return to see Dr. Noriega in 6 months after the testing noted above.    An After Visit Summary was printed and given to the patient.    Subjective:    Anthony Tiwari returned for a planned follow up visit.  His wife accompanied him to the visit.    When we rechecked his CPK after switching him from simvastatin to atorvastatin, it was even higher than before, therefore, atorvastatin was discontinued.     Anthony describes hearing loss, nocturia, and concern about falling.  He does not experience angina pectoris, orthopnea, palpitations or lower extremity edema.  Sometimes he is transiently lightheaded and help quickly.    He states he takes quite a few medications and would like to be on fewer medications, if possible.    Past Medical History:    Patient Active Problem List   Diagnosis   ? Non-Hodgkin's Lymphoma   ? Anxiety   ? CKD (chronic kidney disease) stage 3, GFR 30-59 ml/min (H)   ? Dyslipidemia, goal LDL below 100   ? Idiopathic cardiomyopathy (H)   ? SIRIA on CPAP   ?  Essential hypertension   ? Diabetes mellitus type 2 in nonobese (H)   ? ICD (implantable cardioverter-defibrillator) in place   ? Hypertriglyceridemia   ? Statin intolerance       Past Medical History:   Diagnosis Date   ? Cardiomyopathy    ? CKD (chronic kidney disease) stage 3, GFR 30-59 ml/min (H)    ? Dyslipidemia, goal LDL below 100    ? Essential hypertension    ? Hypertriglyceridemia     As high as 980 mg/dl in past   ? ICD (implantable cardioverter-defibrillator) in place    ? Lymphoma (H) 2015    [facility] Dr. Cody [facility], getting chemo again in 2017   ? Nonocclusive coronary atherosclerosis of native coronary artery     nonobstuctive by cor angio     ? SIRIA on CPAP 2013    he sees someone at [facility]   ? Statin intolerance 2/21/2019    History of elevated CPK on both simvastatin and atorvastatin.       Past Surgical History:   Procedure Laterality Date   ? CARDIAC DEFIBRILLATOR PLACEMENT  07/24/2013   ? CATARACT EXTRACTION W/ INTRAOCULAR LENS  IMPLANT, BILATERAL  05/2018   ? TOTAL HIP ARTHROPLASTY Right 2012        reports that  has never smoked. he has never used smokeless tobacco. He reports that he does not drink alcohol or use drugs.    Family History   Problem Relation Age of Onset   ? Rheumatic Heart Disease Brother         half brother   ? Dementia Brother    ? Stroke Father    ? Rheumatic Heart Disease Sister    ? Sudden death Sister 67        no autopsy   ? CABG Sister 81   ? No Medical Problems Daughter    ? No Medical Problems Daughter    ? No Medical Problems Daughter        Outpatient Encounter Medications as of 2/21/2019   Medication Sig Dispense Refill   ? aspirin 81 MG EC tablet Take 81 mg by mouth bedtime.     ? fenofibrate (TRICOR) 145 MG tablet Take 145 mg by mouth daily.     ? lisinopril (PRINIVIL,ZESTRIL) 2.5 MG tablet Take 2.5 mg by mouth bedtime.      ? metFORMIN (GLUCOPHAGE) 500 MG tablet Take 500 mg by mouth 2 (two) times a day.   2   ? metoprolol (TOPROL-XL) 200 MG 24  "hr tablet Take 200 mg by mouth bedtime.      ? omeprazole (PRILOSEC OTC) 20 MG tablet Take 20 mg by mouth bedtime.      ? spironolactone (ALDACTONE) 25 MG tablet Take 12.5 mg by mouth bedtime.      ? [DISCONTINUED] digoxin (LANOXIN) 125 mcg tablet Take 1 tablet (125 mcg total) by mouth at bedtime. 30 tablet 1   ? [DISCONTINUED] amoxicillin (AMOXIL) 500 MG tablet Take 2,000 mg by mouth as needed. 1 hour before dental appointment      ? [DISCONTINUED] nitroglycerin (NITROSTAT) 0.4 MG SL tablet Place 1 tablet (0.4 mg total) under the tongue every 5 (five) minutes as needed for chest pain. 25 tablet 0     No facility-administered encounter medications on file as of 2/21/2019.        Review of Systems:     General: WNL  Eyes: WNL  Ears/Nose/Throat: Hearing Loss  Lungs: WNL  Heart: WNL  Stomach: WNL  Bladder: Frequent Urination at Night  Muscle/Joints: WNL  Skin: WNL  Nervous System: Falls  Mental Health: WNL     Blood: WNL    Objective:     /54 (Patient Site: Left Arm, Patient Position: Sitting, Cuff Size: Adult Regular)   Pulse 64   Resp 16   Ht 5' 7.32\" (1.71 m)   Wt 180 lb (81.6 kg)   BMI 27.92 kg/m    Wt Readings from Last 5 Encounters:   02/21/19 180 lb (81.6 kg)   08/30/18 184 lb (83.5 kg)   08/20/18 179 lb (81.2 kg)   08/31/17 179 lb (81.2 kg)   05/19/17 180 lb (81.6 kg)        Physical Exam:    GENERAL APPEARANCE: alert, no apparent distress  EYES: no scleral icterus  NECK: no carotid bruits or adenopathy, jugular venous pressure is less than 5 cm  CHEST: symmetric, the lungs are clear to auscultation  CARDIOVASCULAR: regular rhythm without murmur or gallop  EXTREMITIES: no cyanosis, clubbing or edema  SKIN: no xanthelasma  NEUROLOGIC: normal gait and coordination  PSYCHIATRIC: mood and affect are normal    Cardiac Testing:    Echocardiogram:   Results for orders placed during the hospital encounter of 08/20/18   Echo Complete [ECH10] 08/20/2018    Narrative   Left ventricle ejection fraction is mildly " decreased. The calculated   left ventricular ejection fraction is 45% with global hypokinesis.    Grade 1 diastolic dysfunction    Normal right ventricular size and systolic function.    Mild aortic regurgitation    When compared to the previous study dated 5/10/2017, there is   improvement in overall left ventricular function. No other interval   change.          Cardiac Catheterization:   Results for orders placed in visit on 08   CV Historical Imaging Procedure [CATH69] 2008    Narrative See Historical Hospital Medical Record for documentation          Results for orders placed during the hospital encounter of 16   NM Pharmacologic Stress Test [XLL623] 2016    Narrative [facility] Nuclear Cardiology  Pharmacological Stress Test Report    Patient: Anthony Tiwari   MRN: [id number]  : 1935  Primary Care Provider: Hemant LORD MD  Ordering Physician: Mario Melton MD  Indication:        STRESS SUMMARY: 0.4 mg of intravenous Lexiscan was administered over 15   seconds under the supervision of Dr. Paresh Teran. No cardiac symptoms   were reported. The stress electrocardiogram was positive for inducible   ischemia with 1.0-1.4 mm of horizontal ST segment depression.    TECHNICAL COMMENTS: Nuclear imaging was accomplished utilizing 4.36 mCi of     thallium-201 injected at rest and 29.4 mCi of 99m technetium sestamibi   injected at   the completion of Lexiscan infusion. The  images are   satisfactory.     FINDINGS: The Lexiscan stress and rest images demonstrate normal left   ventricular   size and tracer uptake. The gated images reveal moderately reduced global   systolic performance. The measured left ventricular ejection fraction is   41%.     CONCLUSION:   1. Lexiscan stress nuclear study is negative for inducible myocardial   Ischemia.  2. Moderately reduced left ventricular systolic function with an ejection   fraction of 41%.     COMMENTS:    Comparison with the images of the exam of 5/9/13 demonstrates improvement   in left ventricular systolic function on the current study.    The patient is at low risk of future cardiac ischemic events based on   perfusion imaging.      Petrona Jaime MD  11/8/2016 2:41 PM              Imaging:    No results found.    Lab Results:    Lab Results   Component Value Date    CREATININE 1.46 (H) 12/10/2018    BUN 18 12/10/2018     12/10/2018    K 4.9 12/10/2018     12/10/2018    CO2 27 12/10/2018     Lab Results   Component Value Date    CHOL 190 12/10/2018    TRIG 456 (H) 12/10/2018    HDL 31 (L) 12/10/2018    LDLDIRECT 99 12/10/2018     BNP (pg/mL)   Date Value   08/20/2018 67   05/10/2017 93 (H)   11/05/2016 111 (H)     Creatinine (mg/dL)   Date Value   12/10/2018 1.46 (H)   09/12/2018 1.91 (H)   08/30/2018 1.45 (H)   05/30/2018 1.89 (H)     Magnesium (mg/dL)   Date Value   01/30/2014 2.3   10/25/2013 2.4   05/06/2013 2.3     LDL Calculated (mg/dL)   Date Value   12/10/2018      Comment:     Invalid, Triglycerides >400   12/12/2017      Comment:     Invalid, Triglycerides >400   07/10/2017      Comment:     Invalid, Triglycerides >400     Lab Results   Component Value Date    WBC 6.4 04/17/2017    HGB 13.8 (L) 04/17/2017    HCT 41.4 04/17/2017    MCV 93 04/17/2017     04/17/2017           Much or all of the text in this note was generated through the use of the Dragon Dictate voice-to-text software. Errors in spelling or words which seem out of context are unintentional. Sound alike errors, in particular, may have escaped editing.

## 2021-06-28 NOTE — PROGRESS NOTES
Progress Notes by Meng Noriega MD at 11/11/2019  2:30 PM     Author: Meng Noriega MD Service: -- Author Type: Physician    Filed: 11/11/2019  2:51 PM Encounter Date: 11/11/2019 Status: Signed    : Meng Noriega MD (Physician)             Assessment:    1. Idiopathic cardiomyopathy (H)  Echo Complete    BNP(B-type Natriuretic Peptide)    Basic Metabolic Panel   2. ICD (implantable cardioverter-defibrillator) in place         Plan:    1. Continue current cardiovascular medical therapy.  2. Return to see Dr. Noriega in 1 year after the testing noted above.    An After Visit Summary was printed and given to the patient.    Subjective:    Anthony Tiwari returned for a planned annual follow up visit.    Anthony states he is walking less this year as he and his wife are helping their daughter to care for twins born in July.  He really enjoys this work.    He does not experience chest discomfort, orthopnea, palpitations, lightheadedness or syncope.  He does note dyspnea with moderately strenuous exertion.    He really enjoys watching football including the [employer] win over [employer] last Saturday.    Past Medical History:    Patient Active Problem List   Diagnosis   ? Non-Hodgkin's Lymphoma   ? Anxiety   ? CKD (chronic kidney disease) stage 3, GFR 30-59 ml/min (H)   ? Dyslipidemia, goal LDL below 100   ? Idiopathic cardiomyopathy (H)   ? SIRIA on CPAP   ? Essential hypertension   ? Diabetes mellitus type 2 in nonobese (H)   ? ICD (implantable cardioverter-defibrillator) in place   ? Hypertriglyceridemia       Past Medical History:   Diagnosis Date   ? Anxiety    ? Bladder stone    ? Cardiomyopathy    ? CKD (chronic kidney disease) stage 3, GFR 30-59 ml/min (H)    ? Diabetes mellitus (H)    ? 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 Dyslipidemia, goal LDL below 100    ? Essential hypertension    ? Hypertriglyceridemia     As high as 980 mg/dl in past   ? ICD (implantable  cardioverter-defibrillator) in place    ? Kidney stone    ? Lymphoma (H) 2015    see Dr. Cody at [facility], getting chemo again in 2017   ? Nonocclusive coronary atherosclerosis of native coronary artery     nonobstuctive by cor angio     ? SIRIA on CPAP 2013    he sees someone at [facility]   ? Statin intolerance 02/21/2019    history of elevated CPK on both simvastatin but less so on atorvastatin.       Past Surgical History:   Procedure Laterality Date   ? CARDIAC DEFIBRILLATOR PLACEMENT  07/24/2013   ? CATARACT EXTRACTION W/ INTRAOCULAR LENS  IMPLANT, BILATERAL  05/2018   ? CYSTOSCOPY W/ LITHOLAPAXY / EHL N/A 3/19/2019    Procedure: CYSTOLITHOLAPAXY;  Surgeon: Yunior Moore MD;  Location: [facility];  Service: Urology   ? HERNIA REPAIR Left    ? VT CYSTO/URETERO W/LITHOTRIPSY &INDWELL STENT INSRT Right 3/19/2019    Procedure: CYSTOSCOPY RIGHT URETEROSCOPY LASER LITHOTRIPSY;  Surgeon: Yunior Moore MD;  Location: [facility];  Service: Urology   ? TOTAL HIP ARTHROPLASTY Right 2012   ? URETEROSCOPY          reports that he has never smoked. He has never used smokeless tobacco. He reports that he does not drink alcohol or use drugs.    Family History   Problem Relation Age of Onset   ? Rheumatic Heart Disease Brother         half brother   ? Dementia Brother    ? Stroke Father    ? Rheumatic Heart Disease Sister    ? Sudden death Sister 67        no autopsy   ? CABG Sister 81   ? No Medical Problems Daughter    ? No Medical Problems Daughter    ? No Medical Problems Daughter    ? Urolithiasis Neg Hx    ? Clotting disorder Neg Hx    ? Gout Neg Hx        Outpatient Encounter Medications as of 11/11/2019   Medication Sig Dispense Refill   ? aspirin 81 MG EC tablet Take 81 mg by mouth bedtime.     ? atorvastatin (LIPITOR) 20 MG tablet Take 20 mg by mouth at bedtime.     ? fenofibrate (TRICOR) 145 MG tablet Take 145 mg by mouth daily.     ? insulin NPH-insulin regular (NOVOLIN 70-30)  100 unit/mL (70-30) injection Inject 12 Units under the skin 2 (two) times a day before meals.            ? lisinopril (PRINIVIL,ZESTRIL) 2.5 MG tablet Take 2.5 mg by mouth bedtime.      ? metFORMIN (GLUCOPHAGE) 500 MG tablet Take 1,000 mg by mouth 2 (two) times a day.         2   ? metoprolol (TOPROL-XL) 200 MG 24 hr tablet Take 200 mg by mouth bedtime.      ? omeprazole (PRILOSEC OTC) 20 MG tablet Take 20 mg by mouth bedtime.      ? spironolactone (ALDACTONE) 25 MG tablet Take 12.5 mg by mouth bedtime.        No facility-administered encounter medications on file as of 11/11/2019.        Review of Systems:     General: WNL  Eyes: WNL  Ears/Nose/Throat: Hearing Loss  Lungs: WNL  Heart: Shortness of Breath with activity  Stomach: WNL  Bladder: WNL  Muscle/Joints: WNL  Skin: WNL  Nervous System: WNL  Mental Health: WNL     Blood: WNL    Objective:     /56 (Patient Site: Right Arm, Patient Position: Sitting, Cuff Size: Adult Regular)   Pulse (!) 53   Resp 16   Wt 185 lb 4 oz (84 kg)   SpO2 96%   BMI 36.18 kg/m    Wt Readings from Last 5 Encounters:   11/11/19 185 lb 4 oz (84 kg)   11/04/19 165 lb (74.8 kg)   03/19/19 165 lb (74.8 kg)   03/06/19 165 lb (74.8 kg)   02/21/19 180 lb (81.6 kg)        Physical Exam:    GENERAL APPEARANCE: alert, no apparent distress  EYES: no scleral icterus  NECK: no carotid bruits or adenopathy, jugular venous pressure is less than 5 cm  CHEST: symmetric, the lungs are clear to auscultation  CARDIOVASCULAR: regular rhythm without murmur or gallop  EXTREMITIES: no cyanosis, clubbing or edema  SKIN: no xanthelasma  NEUROLOGIC: normal gait and coordination  PSYCHIATRIC: mood and affect are normal    Cardiac Testing:    Echocardiogram:   Results for orders placed during the hospital encounter of 11/04/19   Echo Complete [ECH10] 11/04/2019    Narrative   Left ventricle ejection fraction is mildly decreased. The calculated   left ventricular ejection fraction is 48%.    Normal right  ventricular size and systolic function.    No hemodynamically significant valvular heart abnormalities.    When compared to the previous study dated 2018, no significant   change          Cardiac Catheterization:   Results for orders placed in visit on 08   CV Historical Imaging Procedure [CATH69] 2008    Narrative See Historical Hospital Medical Record for documentation          Results for orders placed during the hospital encounter of 16   NM Pharmacologic Stress Test [FUC099] 2016    Narrative [facility] Nuclear Cardiology  Pharmacological Stress Test Report    Patient: Anthony Tiwari   MRN: [id number]  : 1935  Primary Care Provider: Hemant LORD MD  Ordering Physician: Mario Melton MD  Indication:        STRESS SUMMARY: 0.4 mg of intravenous Lexiscan was administered over 15   seconds under the supervision of Dr. Paresh Teran. No cardiac symptoms   were reported. The stress electrocardiogram was positive for inducible   ischemia with 1.0-1.4 mm of horizontal ST segment depression.    TECHNICAL COMMENTS: Nuclear imaging was accomplished utilizing 4.36 mCi of     thallium-201 injected at rest and 29.4 mCi of 99m technetium sestamibi   injected at   the completion of Lexiscan infusion. The  images are   satisfactory.     FINDINGS: The Lexiscan stress and rest images demonstrate normal left   ventricular   size and tracer uptake. The gated images reveal moderately reduced global   systolic performance. The measured left ventricular ejection fraction is   41%.     CONCLUSION:   1. Lexiscan stress nuclear study is negative for inducible myocardial   Ischemia.  2. Moderately reduced left ventricular systolic function with an ejection   fraction of 41%.     COMMENTS:   Comparison with the images of the exam of 13 demonstrates improvement   in left ventricular systolic function on the current study.    The patient is at low risk of future  cardiac ischemic events based on   perfusion imaging.      Petrona Jaime MD  11/8/2016 2:41 PM              Imaging:    No results found.    Lab Results:    Lab Results   Component Value Date    CREATININE 1.77 (H) 11/06/2019    BUN 22 11/06/2019     11/06/2019    K 5.1 (H) 11/06/2019     (H) 11/06/2019    CO2 26 11/06/2019     Lab Results   Component Value Date    CHOL 190 12/10/2018    TRIG 456 (H) 12/10/2018    HDL 31 (L) 12/10/2018    LDLDIRECT 99 12/10/2018     BNP (pg/mL)   Date Value   11/06/2019 290 (H)   08/20/2018 67   05/10/2017 93 (H)     Creatinine (mg/dL)   Date Value   11/06/2019 1.77 (H)   03/06/2019 1.88 (H)   12/10/2018 1.46 (H)   09/12/2018 1.91 (H)     Magnesium (mg/dL)   Date Value   01/30/2014 2.3   10/25/2013 2.4   05/06/2013 2.3     LDL Calculated (mg/dL)   Date Value   12/10/2018      Comment:     Invalid, Triglycerides >400   12/12/2017      Comment:     Invalid, Triglycerides >400   07/10/2017      Comment:     Invalid, Triglycerides >400     Lab Results   Component Value Date    WBC 12.8 (H) 03/06/2019    HGB 14.2 03/06/2019    HCT 43.2 03/06/2019    MCV 91 03/06/2019     03/06/2019           Much or all of the text in this note was generated through the use of the Dragon Dictate voice-to-text software. Errors in spelling or words which seem out of context are unintentional. Sound alike errors, in particular, may have escaped editing.

## 2021-06-30 NOTE — PROGRESS NOTES
Progress Notes by Meng Noriega MD at 1/14/2021  8:50 AM     Author: Meng Noriega MD Service: -- Author Type: Physician    Filed: 1/14/2021  9:22 AM Encounter Date: 1/14/2021 Status: Signed    : Meng Noriega MD (Physician)             Assessment:    1. Idiopathic cardiomyopathy (H)  Echo Complete    BNP(B-type Natriuretic Peptide)   2. ICD (implantable cardioverter-defibrillator) in place     3. CKD (chronic kidney disease) stage 3, GFR 30-59 ml/min  Renal Function Profile   4. Essential hypertension     5. Dyslipidemia, goal LDL below 100  Lipid Profile       Plan:    1. Continue current cardiovascular medical therapy.  2. He will discuss his intermittent diarrhea with his personal physician, Dr. García Peña.  I do not think it is related to his cardiovascular medications as he is not daily and did not begin when he was placed on metoprolol several years ago.  3. Return to see Dr. Noriega in 1 year after the testing noted above.    An After Visit Summary was printed and given to the patient.    Subjective:    Anthony Tiwari returned for a planned annual follow up visit.    Anthony states that he has done well since our last visit.  He has no difficulties in taking his cardiovascular medications.  As noted last year, he does not walk on a daily basis anymore.  He had planned to volunteer at the [employer] again last summer but that was canceled due to the Covid pandemic.    We reviewed his test results including his echocardiogram and laboratory testing and all were stable.    He has occasional diarrhea which he will discuss with Dr. Peña.    Past Medical History:    Patient Active Problem List   Diagnosis   ? Non-Hodgkin's Lymphoma   ? Anxiety   ? CKD (chronic kidney disease) stage 3, GFR 30-59 ml/min   ? Dyslipidemia, goal LDL below 100   ? Idiopathic cardiomyopathy (H)   ? SIRIA on CPAP   ? Essential hypertension   ? Diabetes mellitus type 2 in nonobese (H)   ? ICD  (implantable cardioverter-defibrillator) in place   ? Hypertriglyceridemia       Past Medical History:   Diagnosis Date   ? Anxiety    ? Bladder stone    ? CKD (chronic kidney disease) stage 3, GFR 30-59 ml/min    ? 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 Dyslipidemia, goal LDL below 100    ? Essential hypertension    ? Hypertriglyceridemia     as high as 980 mg/dl in past   ? ICD (implantable cardioverter-defibrillator) in place 07/24/2013   ? Idiopathic cardiomyopathy 2013    EF was as low as 23%   ? Kidney stone    ? Lymphoma (H) 2015    sees Dr. Cody at [facility], getting chemo again in 2017   ? Nonocclusive coronary atherosclerosis of native coronary artery     nonobstuctive by cor angio     ? SIRIA on CPAP 2013    he sees someone at [facility]   ? Statin intolerance 02/21/2019    history of elevated CPK on both simvastatin but less so on atorvastatin.   ? Type 2 diabetes mellitus (H)        Past Surgical History:   Procedure Laterality Date   ? CARDIAC DEFIBRILLATOR PLACEMENT  07/24/2013    Dr. Rodriguez   ? CATARACT EXTRACTION W/ INTRAOCULAR LENS  IMPLANT, BILATERAL  05/2018   ? CYSTOSCOPY W/ LITHOLAPAXY / EHL N/A 03/19/2019    Procedure: CYSTOLITHOLAPAXY;  Surgeon: Yunior Moore MD;  Location: [facility];  Service: Urology   ? HERNIA REPAIR Left    ? SC CYSTO/URETERO W/LITHOTRIPSY &INDWELL STENT INSRT Right 03/19/2019    Procedure: CYSTOSCOPY RIGHT URETEROSCOPY LASER LITHOTRIPSY;  Surgeon: Yunior Moore MD;  Location: [facility];  Service: Urology   ? TOTAL HIP ARTHROPLASTY Right 2012   ? URETEROSCOPY          reports that he has never smoked. He has never used smokeless tobacco. He reports that he does not drink alcohol or use drugs.  Social History     Socioeconomic History   ? Marital status:      Spouse name: Rebeka   ? Number of children: 3   ? Years of education: Not on file   ? Highest education level: Not on file   Occupational History   ?  Occupation: [employer]     Employer: RETIRED     Comment: [employer]   Social Needs   ? Financial resource strain: Not on file   ? Food insecurity     Worry: Not on file     Inability: Not on file   ? Transportation needs     Medical: Not on file     Non-medical: Not on file   Tobacco Use   ? Smoking status: Never Smoker   ? Smokeless tobacco: Never Used   Substance and Sexual Activity   ? Alcohol use: No   ? Drug use: No   ? Sexual activity: Not on file   Lifestyle   ? Physical activity     Days per week: 0 days     Minutes per session: 0 min   ? Stress: Not on file   Relationships   ? Social connections     Talks on phone: Not on file     Gets together: Not on file     Attends Gnosticist service: Not on file     Active member of club or organization: Not on file     Attends meetings of clubs or organizations: Not on file     Relationship status: Not on file   ? Intimate partner violence     Fear of current or ex partner: Not on file     Emotionally abused: Not on file     Physically abused: Not on file     Forced sexual activity: Not on file   Other Topics Concern   ? Not on file   Social History Narrative    He used to walk for an hour most days and still takes care of his lawn and snow removal, though his grandsons are helping more with the lawn in 2018. He volunteers at the [employer] since 2000. He is walking less in 2019. His wife, Rebeka, is 6 years his tu and is providing daytime and overnight  for their daughter Loree's fraternal twins, Estrella and Jovanny, since 2019. Anthony spends 5 hours a day with Loree and the twins.       Family History   Problem Relation Age of Onset   ? Rheumatic Heart Disease Brother         half brother   ? Dementia Brother    ? Stroke Father    ? Rheumatic Heart Disease Sister    ? Sudden death Sister 67        no autopsy   ? CABG Sister 81   ? No Medical Problems Daughter    ? No Medical Problems Daughter    ? No Medical  "Problems Daughter    ? No Medical Problems Granddaughter    ? No Medical Problems Grandson    ? Urolithiasis Neg Hx    ? Clotting disorder Neg Hx    ? Gout Neg Hx        Outpatient Encounter Medications as of 1/14/2021   Medication Sig Dispense Refill   ? aspirin 81 MG EC tablet Take 81 mg by mouth bedtime.     ? atorvastatin (LIPITOR) 20 MG tablet Take 20 mg by mouth at bedtime.     ? fenofibrate (TRICOR) 145 MG tablet Take 145 mg by mouth daily.     ? insulin NPH-insulin regular (NOVOLIN 70-30) 100 unit/mL (70-30) injection Inject 12 Units under the skin 2 (two) times a day before meals.            ? lisinopril (PRINIVIL,ZESTRIL) 2.5 MG tablet Take 2.5 mg by mouth bedtime.      ? metFORMIN (GLUCOPHAGE) 500 MG tablet Take 1,000 mg by mouth 2 (two) times a day.         2   ? metoprolol (TOPROL-XL) 200 MG 24 hr tablet Take 200 mg by mouth bedtime.      ? omeprazole (PRILOSEC OTC) 20 MG tablet Take 20 mg by mouth bedtime.      ? spironolactone (ALDACTONE) 25 MG tablet Take 12.5 mg by mouth bedtime.        No facility-administered encounter medications on file as of 1/14/2021.        Review of Systems:     General: WNL  Eyes: WNL  Ears/Nose/Throat: WNL  Lungs: WNL  Heart: WNL  Stomach: Diarrhea  Bladder: Frequent Urination at Night  Muscle/Joints: Muscle Weakness  Skin: WNL  Nervous System: WNL  Mental Health: WNL     Blood: WNL    Objective:     /60 (Patient Site: Left Arm, Patient Position: Sitting, Cuff Size: Adult Large)   Pulse 60   Resp 12   Ht 5' 9\" (1.753 m)   Wt 185 lb (83.9 kg)   BMI 27.32 kg/m    Wt Readings from Last 5 Encounters:   01/14/21 185 lb (83.9 kg)   12/15/20 185 lb (83.9 kg)   11/11/19 185 lb 4 oz (84 kg)   11/04/19 165 lb (74.8 kg)   03/19/19 165 lb (74.8 kg)        Physical Exam:    GENERAL APPEARANCE: alert, no apparent distress  EYES: no scleral icterus  NECK: no carotid bruits or adenopathy, jugular venous pressure is less than 5 cm  CHEST: symmetric, the lungs are clear to " auscultation  CARDIOVASCULAR: regular rhythm without murmur or gallop  EXTREMITIES: no cyanosis, clubbing or edema  SKIN: no xanthelasma  NEUROLOGIC: normal gait and coordination  PSYCHIATRIC: mood and affect are normal    Cardiac Testing:    Echocardiogram:   Results for orders placed during the hospital encounter of 12/15/20   Echo Complete [ECH10] 12/15/2020    Narrative   Left ventricle ejection fraction is mildly decreased. The calculated   left ventricular ejection fraction is 45%.    Normal right ventricular size and systolic function.    No significant valvular heart abnormalities    When compared to the previous study dated 2019, no significant   change.          Cardiac Catheterization:   Results for orders placed in visit on 08   CV Historical Imaging Procedure [CATH69] 2008    Narrative See Historical Hospital Medical Record for documentation          Results for orders placed during the hospital encounter of 16   NM Pharmacologic Stress Test [SBJ106] 2016    Narrative [facility] Nuclear Cardiology  Pharmacological Stress Test Report    Patient: Anthony Tiwari   MRN: [id number]  : 1935  Primary Care Provider: Hemant LORD MD  Ordering Physician: Mario Melton MD  Indication:        STRESS SUMMARY: 0.4 mg of intravenous Lexiscan was administered over 15   seconds under the supervision of Dr. Paresh Teran. No cardiac symptoms   were reported. The stress electrocardiogram was positive for inducible   ischemia with 1.0-1.4 mm of horizontal ST segment depression.    TECHNICAL COMMENTS: Nuclear imaging was accomplished utilizing 4.36 mCi of     thallium-201 injected at rest and 29.4 mCi of 99m technetium sestamibi   injected at   the completion of Lexiscan infusion. The  images are   satisfactory.     FINDINGS: The Lexiscan stress and rest images demonstrate normal left   ventricular   size and tracer uptake. The gated images reveal  moderately reduced global   systolic performance. The measured left ventricular ejection fraction is   41%.     CONCLUSION:   1. Lexiscan stress nuclear study is negative for inducible myocardial   Ischemia.  2. Moderately reduced left ventricular systolic function with an ejection   fraction of 41%.     COMMENTS:   Comparison with the images of the exam of 5/9/13 demonstrates improvement   in left ventricular systolic function on the current study.    The patient is at low risk of future cardiac ischemic events based on   perfusion imaging.      Petrona Jaime MD  11/8/2016 2:41 PM              Imaging:    Echo Complete    Result Date: 12/15/2020    Left ventricle ejection fraction is mildly decreased. The calculated left ventricular ejection fraction is 45%.   Normal right ventricular size and systolic function.   No significant valvular heart abnormalities   When compared to the previous study dated 11/4/2019, no significant change.        Lab Results:    Lab Results   Component Value Date    CREATININE 1.87 (H) 01/06/2021    BUN 29 (H) 01/06/2021     01/06/2021    K 4.7 01/06/2021     01/06/2021    CO2 23 01/06/2021     Lab Results   Component Value Date    CHOL 133 01/06/2021    TRIG 196 (H) 01/06/2021    HDL 34 (L) 01/06/2021    LDLDIRECT 99 12/10/2018     BNP (pg/mL)   Date Value   12/15/2020 224 (H)   11/06/2019 290 (H)   08/20/2018 67     Creatinine (mg/dL)   Date Value   01/06/2021 1.87 (H)   12/15/2020 1.86 (H)   03/10/2020 1.93 (H)   11/06/2019 1.77 (H)     Magnesium (mg/dL)   Date Value   01/30/2014 2.3   10/25/2013 2.4   05/06/2013 2.3     LDL Calculated (mg/dL)   Date Value   01/06/2021 60   03/10/2020 59   12/10/2018      Comment:     Invalid, Triglycerides >400     Lab Results   Component Value Date    WBC 12.8 (H) 03/06/2019    HGB 14.2 03/06/2019    HCT 43.2 03/06/2019    MCV 91 03/06/2019     03/06/2019           Much or all of the text in this note was generated  through the use of the Dragon Dictate voice-to-text software. Errors in spelling or words which seem out of context are unintentional. Sound alike errors, in particular, may have escaped editing.

## 2021-07-03 NOTE — ANESTHESIA PREPROCEDURE EVALUATION
Anesthesia Preprocedure Evaluation by Liset Lancaster MD at 3/19/2019  7:58 AM     Author: Liset Lancaster MD Service: -- Author Type: Physician    Filed: 3/19/2019  8:20 AM Date of Service: 3/19/2019  7:58 AM Status: Addendum    : Liset Lancaster MD (Physician)    Related Notes: Original Note by Liset Lancaster MD (Physician) filed at 3/19/2019  8:01 AM       Anesthesia Evaluation      No history of anesthetic complications     Airway   Mallampati: II  Neck ROM: full   Pulmonary     breath sounds clear to auscultation  (+) sleep apnea on CPAP, ,                          Cardiovascular   (+) pacemaker (ICD), hypertension, past MI, CAD, CHF, ,     Rhythm: regular  Rate: normal,      ROS comment: TTE 10/2018-    Left ventricle ejection fraction is mildly decreased. The calculated left ventricular ejection fraction is 45% with global hypokinesis.    Grade 1 diastolic dysfunction    Normal right ventricular size and systolic function.    Mild aortic regurgitation    When compared to the previous study dated 5/10/2017, there is improvement in overall left ventricular function. No other interval change.       ICD Check 3/4/19-  Type: routine remote ICD transmission.  Presenting rhythm: AP-VS, rate 60 bpm.  Battery/lead status: stable  Arrhythmias: since 11/29/18, one mode switch episode, duration 5 seconds, EGM suggestive of  PSVT. No VT/VF detected.  Comments: normal ICD function.   Device/lead alerts: none. prd     Neuro/Psych    (+) anxiety/panic attacks,     Endo/Other    (+) diabetes mellitus type 2, obesity (BMI 32),      Comments: NHL      GI/Hepatic/Renal    (+)   chronic renal disease (Cr 1.4) CRI,           Dental    (+) poor dentition                           Anesthesia Plan  Planned anesthetic: general LMA  LMA 4  Decadron 4, Zofran 4 for antiemesis   Magnet available if EM interference noted w/ laser lithotripsy, o/w no change   ASA 3   Induction: intravenous   Anesthetic plan and risks discussed with:  patient  Anesthesia plan special considerations: antiemetics,   Post-op plan: routine recovery

## 2021-07-28 ENCOUNTER — DOCUMENTATION ONLY (OUTPATIENT)
Dept: CARDIOLOGY | Facility: CLINIC | Age: 86
End: 2021-07-28

## 2021-07-28 NOTE — PROGRESS NOTES
Is the implanted device safe for MRI Exam?  No  Is this device 3T compatible? No  Device Type: ICD      Device Information:  Make: EpiCrystals  Model: Energen    Cardiology Orders for Device Programming     -- No -- The patient has a MRI conditional pulse generator and leads from the same     -- Yes -- The pulse generator and leads have been implanted for at least 6 weeks    -- Yes-- The device is implanted in the right or left pectoral region    -- N/A -- There are not any additional active cardiac devices, abandoned leads, lead extenders or adapters    -- Yes -- The device lead impedance measurements are within the normal range. (Manufacture recommendations: Medtronic Advisa and Revo 200-1,500 ohms; Medtronic ICD and CRT's 200-3000 ohms and defibrillation lead impedance   ohms)    -- N/A -- If the patient is pacemaker dependent the thresholds are less than or equal to 2.0V @ 0.4ms.

## 2021-08-04 ENCOUNTER — HOSPITAL ENCOUNTER (OUTPATIENT)
Dept: CT IMAGING | Facility: CLINIC | Age: 86
Discharge: HOME | End: 2021-08-04
Attending: PSYCHIATRY & NEUROLOGY | Admitting: PSYCHIATRY & NEUROLOGY
Payer: MEDICARE

## 2021-08-04 DIAGNOSIS — G20.A1 PARKINSONS DISEASE (H): ICD-10-CM

## 2021-08-04 PROCEDURE — 70450 CT HEAD/BRAIN W/O DYE: CPT

## 2021-08-12 ENCOUNTER — ANCILLARY PROCEDURE (OUTPATIENT)
Dept: CARDIOLOGY | Facility: CLINIC | Age: 86
End: 2021-08-12
Attending: INTERNAL MEDICINE
Payer: MEDICARE

## 2021-08-12 DIAGNOSIS — Z95.810 DUAL ICD (IMPLANTABLE CARDIOVERTER-DEFIBRILLATOR) IN PLACE: ICD-10-CM

## 2021-08-12 DIAGNOSIS — I42.9 IDIOPATHIC CARDIOMYOPATHY (H): Primary | ICD-10-CM

## 2021-08-12 PROCEDURE — 93295 DEV INTERROG REMOTE 1/2/MLT: CPT | Performed by: INTERNAL MEDICINE

## 2021-08-12 PROCEDURE — 93296 REM INTERROG EVL PM/IDS: CPT | Performed by: INTERNAL MEDICINE

## 2021-08-13 LAB
MDC_IDC_EPISODE_DTM: NORMAL
MDC_IDC_EPISODE_ID: NORMAL
MDC_IDC_EPISODE_TYPE: NORMAL
MDC_IDC_LEAD_IMPLANT_DT: NORMAL
MDC_IDC_LEAD_IMPLANT_DT: NORMAL
MDC_IDC_LEAD_LOCATION: NORMAL
MDC_IDC_LEAD_LOCATION: NORMAL
MDC_IDC_LEAD_LOCATION_DETAIL_1: NORMAL
MDC_IDC_LEAD_LOCATION_DETAIL_1: NORMAL
MDC_IDC_LEAD_MFG: NORMAL
MDC_IDC_LEAD_MFG: NORMAL
MDC_IDC_LEAD_MODEL: NORMAL
MDC_IDC_LEAD_MODEL: NORMAL
MDC_IDC_LEAD_POLARITY_TYPE: NORMAL
MDC_IDC_LEAD_POLARITY_TYPE: NORMAL
MDC_IDC_LEAD_SERIAL: NORMAL
MDC_IDC_LEAD_SERIAL: NORMAL
MDC_IDC_MSMT_BATTERY_DTM: NORMAL
MDC_IDC_MSMT_BATTERY_REMAINING_LONGEVITY: 48 MO
MDC_IDC_MSMT_BATTERY_REMAINING_PERCENTAGE: 56 %
MDC_IDC_MSMT_BATTERY_STATUS: NORMAL
MDC_IDC_MSMT_CAP_CHARGE_DTM: NORMAL
MDC_IDC_MSMT_CAP_CHARGE_DTM: NORMAL
MDC_IDC_MSMT_CAP_CHARGE_ENERGY: 17 J
MDC_IDC_MSMT_CAP_CHARGE_TIME: 11.5 S
MDC_IDC_MSMT_CAP_CHARGE_TIME: 3.1 S
MDC_IDC_MSMT_CAP_CHARGE_TYPE: NORMAL
MDC_IDC_MSMT_CAP_CHARGE_TYPE: NORMAL
MDC_IDC_MSMT_LEADCHNL_RA_IMPEDANCE_VALUE: 513 OHM
MDC_IDC_MSMT_LEADCHNL_RA_PACING_THRESHOLD_AMPLITUDE: 1.1 V
MDC_IDC_MSMT_LEADCHNL_RA_PACING_THRESHOLD_PULSEWIDTH: 0.6 MS
MDC_IDC_MSMT_LEADCHNL_RV_IMPEDANCE_VALUE: 612 OHM
MDC_IDC_MSMT_LEADCHNL_RV_PACING_THRESHOLD_AMPLITUDE: 0.6 V
MDC_IDC_MSMT_LEADCHNL_RV_PACING_THRESHOLD_PULSEWIDTH: 0.4 MS
MDC_IDC_PG_IMPLANT_DTM: NORMAL
MDC_IDC_PG_MFG: NORMAL
MDC_IDC_PG_MODEL: NORMAL
MDC_IDC_PG_SERIAL: NORMAL
MDC_IDC_PG_TYPE: NORMAL
MDC_IDC_SESS_CLINIC_NAME: NORMAL
MDC_IDC_SESS_DTM: NORMAL
MDC_IDC_SESS_TYPE: NORMAL
MDC_IDC_SET_BRADY_AT_MODE_SWITCH_MODE: NORMAL
MDC_IDC_SET_BRADY_AT_MODE_SWITCH_RATE: 170 {BEATS}/MIN
MDC_IDC_SET_BRADY_LOWRATE: 60 {BEATS}/MIN
MDC_IDC_SET_BRADY_MAX_SENSOR_RATE: 130 {BEATS}/MIN
MDC_IDC_SET_BRADY_MAX_TRACKING_RATE: 130 {BEATS}/MIN
MDC_IDC_SET_BRADY_MODE: NORMAL
MDC_IDC_SET_BRADY_PAV_DELAY_HIGH: 180 MS
MDC_IDC_SET_BRADY_PAV_DELAY_LOW: 260 MS
MDC_IDC_SET_BRADY_SAV_DELAY_HIGH: 160 MS
MDC_IDC_SET_BRADY_SAV_DELAY_LOW: 230 MS
MDC_IDC_SET_LEADCHNL_RA_PACING_AMPLITUDE: 1.8 V
MDC_IDC_SET_LEADCHNL_RA_PACING_POLARITY: NORMAL
MDC_IDC_SET_LEADCHNL_RA_PACING_PULSEWIDTH: 0.6 MS
MDC_IDC_SET_LEADCHNL_RA_SENSING_ADAPTATION_MODE: NORMAL
MDC_IDC_SET_LEADCHNL_RA_SENSING_POLARITY: NORMAL
MDC_IDC_SET_LEADCHNL_RA_SENSING_SENSITIVITY: 0.5 MV
MDC_IDC_SET_LEADCHNL_RV_PACING_AMPLITUDE: 1.5 V
MDC_IDC_SET_LEADCHNL_RV_PACING_POLARITY: NORMAL
MDC_IDC_SET_LEADCHNL_RV_PACING_PULSEWIDTH: 0.4 MS
MDC_IDC_SET_LEADCHNL_RV_SENSING_ADAPTATION_MODE: NORMAL
MDC_IDC_SET_LEADCHNL_RV_SENSING_POLARITY: NORMAL
MDC_IDC_SET_LEADCHNL_RV_SENSING_SENSITIVITY: 0.6 MV
MDC_IDC_SET_ZONE_DETECTION_INTERVAL: 300 MS
MDC_IDC_SET_ZONE_TYPE: NORMAL
MDC_IDC_SET_ZONE_VENDOR_TYPE: NORMAL
MDC_IDC_STAT_BRADY_DTM_END: NORMAL
MDC_IDC_STAT_BRADY_DTM_START: NORMAL
MDC_IDC_STAT_BRADY_RA_PERCENT_PACED: 56 %
MDC_IDC_STAT_BRADY_RV_PERCENT_PACED: 0 %
MDC_IDC_STAT_EPISODE_RECENT_COUNT: 0
MDC_IDC_STAT_EPISODE_RECENT_COUNT: 1
MDC_IDC_STAT_EPISODE_RECENT_COUNT: 4
MDC_IDC_STAT_EPISODE_RECENT_COUNT_DTM_END: NORMAL
MDC_IDC_STAT_EPISODE_RECENT_COUNT_DTM_START: NORMAL
MDC_IDC_STAT_EPISODE_TYPE: NORMAL
MDC_IDC_STAT_EPISODE_VENDOR_TYPE: NORMAL
MDC_IDC_STAT_TACHYTHERAPY_ATP_DELIVERED_RECENT: 0
MDC_IDC_STAT_TACHYTHERAPY_ATP_DELIVERED_TOTAL: 0
MDC_IDC_STAT_TACHYTHERAPY_RECENT_DTM_END: NORMAL
MDC_IDC_STAT_TACHYTHERAPY_RECENT_DTM_START: NORMAL
MDC_IDC_STAT_TACHYTHERAPY_SHOCKS_ABORTED_RECENT: 0
MDC_IDC_STAT_TACHYTHERAPY_SHOCKS_ABORTED_TOTAL: 0
MDC_IDC_STAT_TACHYTHERAPY_SHOCKS_DELIVERED_RECENT: 0
MDC_IDC_STAT_TACHYTHERAPY_SHOCKS_DELIVERED_TOTAL: 1
MDC_IDC_STAT_TACHYTHERAPY_TOTAL_DTM_END: NORMAL

## 2021-08-16 ENCOUNTER — LAB REQUISITION (OUTPATIENT)
Dept: LAB | Facility: CLINIC | Age: 86
End: 2021-08-16
Payer: MEDICARE

## 2021-08-16 DIAGNOSIS — E87.5 HYPERKALEMIA: ICD-10-CM

## 2021-08-16 LAB
ANION GAP SERPL CALCULATED.3IONS-SCNC: 11 MMOL/L (ref 5–18)
BUN SERPL-MCNC: 33 MG/DL (ref 8–28)
CALCIUM SERPL-MCNC: 9.8 MG/DL (ref 8.5–10.5)
CHLORIDE BLD-SCNC: 107 MMOL/L (ref 98–107)
CO2 SERPL-SCNC: 21 MMOL/L (ref 22–31)
CREAT SERPL-MCNC: 1.98 MG/DL (ref 0.7–1.3)
GFR SERPL CREATININE-BSD FRML MDRD: 30 ML/MIN/1.73M2
GLUCOSE BLD-MCNC: 95 MG/DL (ref 70–125)
POTASSIUM BLD-SCNC: 5.4 MMOL/L (ref 3.5–5)
SODIUM SERPL-SCNC: 139 MMOL/L (ref 136–145)

## 2021-08-16 PROCEDURE — 80048 BASIC METABOLIC PNL TOTAL CA: CPT | Performed by: PHYSICIAN ASSISTANT

## 2021-10-04 ENCOUNTER — LAB REQUISITION (OUTPATIENT)
Dept: LAB | Facility: CLINIC | Age: 86
End: 2021-10-04
Payer: MEDICARE

## 2021-10-04 DIAGNOSIS — E87.5 HYPERKALEMIA: ICD-10-CM

## 2021-10-04 LAB
ANION GAP SERPL CALCULATED.3IONS-SCNC: 11 MMOL/L (ref 5–18)
BUN SERPL-MCNC: 25 MG/DL (ref 8–28)
CALCIUM SERPL-MCNC: 9.6 MG/DL (ref 8.5–10.5)
CHLORIDE BLD-SCNC: 109 MMOL/L (ref 98–107)
CO2 SERPL-SCNC: 20 MMOL/L (ref 22–31)
CREAT SERPL-MCNC: 1.66 MG/DL (ref 0.7–1.3)
GFR SERPL CREATININE-BSD FRML MDRD: 37 ML/MIN/1.73M2
GLUCOSE BLD-MCNC: 89 MG/DL (ref 70–125)
POTASSIUM BLD-SCNC: 4.7 MMOL/L (ref 3.5–5)
SODIUM SERPL-SCNC: 140 MMOL/L (ref 136–145)

## 2021-10-04 PROCEDURE — 80048 BASIC METABOLIC PNL TOTAL CA: CPT | Mod: ORL | Performed by: FAMILY MEDICINE

## 2021-10-06 ENCOUNTER — ANCILLARY PROCEDURE (OUTPATIENT)
Dept: CARDIOLOGY | Facility: CLINIC | Age: 86
End: 2021-10-06
Attending: INTERNAL MEDICINE
Payer: MEDICARE

## 2021-10-06 DIAGNOSIS — I42.8 NICM (NONISCHEMIC CARDIOMYOPATHY) (H): ICD-10-CM

## 2021-10-06 DIAGNOSIS — Z95.810 DUAL ICD (IMPLANTABLE CARDIOVERTER-DEFIBRILLATOR) IN PLACE: ICD-10-CM

## 2021-10-06 LAB
MDC_IDC_LEAD_IMPLANT_DT: NORMAL
MDC_IDC_LEAD_IMPLANT_DT: NORMAL
MDC_IDC_LEAD_LOCATION: NORMAL
MDC_IDC_LEAD_LOCATION: NORMAL
MDC_IDC_LEAD_LOCATION_DETAIL_1: NORMAL
MDC_IDC_LEAD_LOCATION_DETAIL_1: NORMAL
MDC_IDC_LEAD_MFG: NORMAL
MDC_IDC_LEAD_MFG: NORMAL
MDC_IDC_LEAD_MODEL: NORMAL
MDC_IDC_LEAD_MODEL: NORMAL
MDC_IDC_LEAD_POLARITY_TYPE: NORMAL
MDC_IDC_LEAD_POLARITY_TYPE: NORMAL
MDC_IDC_LEAD_SERIAL: NORMAL
MDC_IDC_LEAD_SERIAL: NORMAL
MDC_IDC_MSMT_BATTERY_DTM: NORMAL
MDC_IDC_MSMT_BATTERY_REMAINING_LONGEVITY: 48 MO
MDC_IDC_MSMT_BATTERY_STATUS: NORMAL
MDC_IDC_MSMT_CAP_CHARGE_DTM: NORMAL
MDC_IDC_MSMT_CAP_CHARGE_ENERGY: 17 J
MDC_IDC_MSMT_CAP_CHARGE_TIME: 11.52
MDC_IDC_MSMT_CAP_CHARGE_TIME: 3.1
MDC_IDC_MSMT_CAP_CHARGE_TYPE: NORMAL
MDC_IDC_MSMT_CAP_CHARGE_TYPE: NORMAL
MDC_IDC_MSMT_LEADCHNL_RA_IMPEDANCE_VALUE: 537 OHM
MDC_IDC_MSMT_LEADCHNL_RA_PACING_THRESHOLD_AMPLITUDE: 1 V
MDC_IDC_MSMT_LEADCHNL_RA_PACING_THRESHOLD_PULSEWIDTH: 0.6 MS
MDC_IDC_MSMT_LEADCHNL_RA_SENSING_INTR_AMPL: 4.1 MV
MDC_IDC_MSMT_LEADCHNL_RV_IMPEDANCE_VALUE: 588 OHM
MDC_IDC_MSMT_LEADCHNL_RV_PACING_THRESHOLD_AMPLITUDE: 0.7 V
MDC_IDC_MSMT_LEADCHNL_RV_PACING_THRESHOLD_PULSEWIDTH: 0.4 MS
MDC_IDC_MSMT_LEADCHNL_RV_SENSING_INTR_AMPL: 19.6 MV
MDC_IDC_PG_IMPLANT_DTM: NORMAL
MDC_IDC_PG_MFG: NORMAL
MDC_IDC_PG_MODEL: NORMAL
MDC_IDC_PG_SERIAL: NORMAL
MDC_IDC_PG_TYPE: NORMAL
MDC_IDC_SESS_CLINIC_NAME: NORMAL
MDC_IDC_SESS_DTM: NORMAL
MDC_IDC_SESS_TYPE: NORMAL
MDC_IDC_SET_BRADY_AT_MODE_SWITCH_MODE: NORMAL
MDC_IDC_SET_BRADY_AT_MODE_SWITCH_RATE: 170 {BEATS}/MIN
MDC_IDC_SET_BRADY_LOWRATE: 60 {BEATS}/MIN
MDC_IDC_SET_BRADY_MAX_SENSOR_RATE: 130 {BEATS}/MIN
MDC_IDC_SET_BRADY_MAX_TRACKING_RATE: 130 {BEATS}/MIN
MDC_IDC_SET_BRADY_MODE: NORMAL
MDC_IDC_SET_BRADY_PAV_DELAY_HIGH: 180 MS
MDC_IDC_SET_BRADY_PAV_DELAY_LOW: 260 MS
MDC_IDC_SET_BRADY_SAV_DELAY_HIGH: 160 MS
MDC_IDC_SET_BRADY_SAV_DELAY_LOW: 230 MS
MDC_IDC_SET_LEADCHNL_RA_PACING_AMPLITUDE: 1.8 V
MDC_IDC_SET_LEADCHNL_RA_PACING_CAPTURE_MODE: NORMAL
MDC_IDC_SET_LEADCHNL_RA_PACING_POLARITY: NORMAL
MDC_IDC_SET_LEADCHNL_RA_PACING_PULSEWIDTH: 0.6 MS
MDC_IDC_SET_LEADCHNL_RA_SENSING_ADAPTATION_MODE: NORMAL
MDC_IDC_SET_LEADCHNL_RA_SENSING_POLARITY: NORMAL
MDC_IDC_SET_LEADCHNL_RA_SENSING_SENSITIVITY: 0.5 MV
MDC_IDC_SET_LEADCHNL_RV_PACING_AMPLITUDE: 1.5 V
MDC_IDC_SET_LEADCHNL_RV_PACING_CAPTURE_MODE: NORMAL
MDC_IDC_SET_LEADCHNL_RV_PACING_POLARITY: NORMAL
MDC_IDC_SET_LEADCHNL_RV_PACING_PULSEWIDTH: 0.4 MS
MDC_IDC_SET_LEADCHNL_RV_SENSING_ADAPTATION_MODE: NORMAL
MDC_IDC_SET_LEADCHNL_RV_SENSING_POLARITY: NORMAL
MDC_IDC_SET_LEADCHNL_RV_SENSING_SENSITIVITY: 0.6 MV
MDC_IDC_SET_ZONE_DETECTION_INTERVAL: 300 MS
MDC_IDC_SET_ZONE_TYPE: NORMAL
MDC_IDC_SET_ZONE_VENDOR_TYPE: NORMAL
MDC_IDC_STAT_AT_MODE_SW_COUNT: 4
MDC_IDC_STAT_BRADY_DTM_END: NORMAL
MDC_IDC_STAT_BRADY_DTM_START: NORMAL
MDC_IDC_STAT_BRADY_RA_PERCENT_PACED: 56 %
MDC_IDC_STAT_BRADY_RV_PERCENT_PACED: 1 %
MDC_IDC_STAT_EPISODE_RECENT_COUNT: 0
MDC_IDC_STAT_EPISODE_RECENT_COUNT: 0
MDC_IDC_STAT_EPISODE_RECENT_COUNT: 1
MDC_IDC_STAT_EPISODE_RECENT_COUNT_DTM_END: NORMAL
MDC_IDC_STAT_EPISODE_RECENT_COUNT_DTM_START: NORMAL
MDC_IDC_STAT_EPISODE_TOTAL_COUNT: 0
MDC_IDC_STAT_EPISODE_TOTAL_COUNT: 1
MDC_IDC_STAT_EPISODE_TOTAL_COUNT: 7
MDC_IDC_STAT_EPISODE_TOTAL_COUNT_DTM_END: NORMAL
MDC_IDC_STAT_EPISODE_TYPE: NORMAL
MDC_IDC_STAT_EPISODE_VENDOR_TYPE: NORMAL
MDC_IDC_STAT_TACHYTHERAPY_ATP_DELIVERED_RECENT: 0
MDC_IDC_STAT_TACHYTHERAPY_ATP_DELIVERED_TOTAL: 0
MDC_IDC_STAT_TACHYTHERAPY_RECENT_DTM_END: NORMAL
MDC_IDC_STAT_TACHYTHERAPY_RECENT_DTM_START: NORMAL
MDC_IDC_STAT_TACHYTHERAPY_SHOCKS_ABORTED_RECENT: 0
MDC_IDC_STAT_TACHYTHERAPY_SHOCKS_ABORTED_TOTAL: 0
MDC_IDC_STAT_TACHYTHERAPY_SHOCKS_DELIVERED_RECENT: 0
MDC_IDC_STAT_TACHYTHERAPY_SHOCKS_DELIVERED_TOTAL: 1
MDC_IDC_STAT_TACHYTHERAPY_TOTAL_DTM_END: NORMAL

## 2021-10-06 PROCEDURE — 93280 PM DEVICE PROGR EVAL DUAL: CPT | Performed by: INTERNAL MEDICINE

## 2021-10-06 RX ORDER — CARBIDOPA AND LEVODOPA 25; 100 MG/1; MG/1
2 TABLET ORAL EVERY MORNING
COMMUNITY
Start: 2021-09-30

## 2022-01-05 ENCOUNTER — TELEPHONE (OUTPATIENT)
Dept: CARDIOLOGY | Facility: CLINIC | Age: 87
End: 2022-01-05
Payer: MEDICARE

## 2022-01-05 DIAGNOSIS — I10 ESSENTIAL HYPERTENSION: ICD-10-CM

## 2022-01-05 DIAGNOSIS — I42.9 IDIOPATHIC CARDIOMYOPATHY (H): Primary | ICD-10-CM

## 2022-01-05 DIAGNOSIS — I42.8 NICM (NONISCHEMIC CARDIOMYOPATHY) (H): ICD-10-CM

## 2022-01-05 NOTE — TELEPHONE ENCOUNTER
----- Message from Mela CHLOE Manriquez sent at 1/5/2022 11:48 AM CST -----  Regarding: DUNCAN/JENINFER  General phone call:    Caller: Anthony  [facility] cardiologist: TARYN  Detailed reason for call: Pt is calling to schedule an appt with JENNIFER. Pt is a former [facility] pt. DUNCAN wanted pt to have echo done prior. New order is needed to for scheduling.     Best phone number: [phone]  Best time to contact: anytime  Ok to leave a detailedmessage? yes  Device? yes    Additional Info: SENDING MESSAGE TO DEVICE as WELL. Will a device check be needed?     Order for echo by ANAID can't be extended/used. New order placed. COLLEEN,Rn

## 2022-01-12 ENCOUNTER — ANCILLARY PROCEDURE (OUTPATIENT)
Dept: CARDIOLOGY | Facility: CLINIC | Age: 87
End: 2022-01-12
Attending: INTERNAL MEDICINE
Payer: MEDICARE

## 2022-01-12 DIAGNOSIS — I42.9 IDIOPATHIC CARDIOMYOPATHY (H): ICD-10-CM

## 2022-01-12 DIAGNOSIS — Z95.810 DUAL ICD (IMPLANTABLE CARDIOVERTER-DEFIBRILLATOR) IN PLACE: ICD-10-CM

## 2022-01-12 PROCEDURE — 93296 REM INTERROG EVL PM/IDS: CPT | Performed by: INTERNAL MEDICINE

## 2022-01-12 PROCEDURE — 93295 DEV INTERROG REMOTE 1/2/MLT: CPT | Performed by: INTERNAL MEDICINE

## 2022-01-13 LAB
MDC_IDC_LEAD_IMPLANT_DT: NORMAL
MDC_IDC_LEAD_IMPLANT_DT: NORMAL
MDC_IDC_LEAD_LOCATION: NORMAL
MDC_IDC_LEAD_LOCATION: NORMAL
MDC_IDC_LEAD_LOCATION_DETAIL_1: NORMAL
MDC_IDC_LEAD_LOCATION_DETAIL_1: NORMAL
MDC_IDC_LEAD_MFG: NORMAL
MDC_IDC_LEAD_MFG: NORMAL
MDC_IDC_LEAD_MODEL: NORMAL
MDC_IDC_LEAD_MODEL: NORMAL
MDC_IDC_LEAD_POLARITY_TYPE: NORMAL
MDC_IDC_LEAD_POLARITY_TYPE: NORMAL
MDC_IDC_LEAD_SERIAL: NORMAL
MDC_IDC_LEAD_SERIAL: NORMAL
MDC_IDC_MSMT_BATTERY_DTM: NORMAL
MDC_IDC_MSMT_BATTERY_REMAINING_LONGEVITY: 42 MO
MDC_IDC_MSMT_BATTERY_REMAINING_PERCENTAGE: 52 %
MDC_IDC_MSMT_BATTERY_STATUS: NORMAL
MDC_IDC_MSMT_CAP_CHARGE_DTM: NORMAL
MDC_IDC_MSMT_CAP_CHARGE_DTM: NORMAL
MDC_IDC_MSMT_CAP_CHARGE_ENERGY: 17 J
MDC_IDC_MSMT_CAP_CHARGE_TIME: 11.6 S
MDC_IDC_MSMT_CAP_CHARGE_TIME: 3.1 S
MDC_IDC_MSMT_CAP_CHARGE_TYPE: NORMAL
MDC_IDC_MSMT_CAP_CHARGE_TYPE: NORMAL
MDC_IDC_MSMT_LEADCHNL_RA_IMPEDANCE_VALUE: 551 OHM
MDC_IDC_MSMT_LEADCHNL_RA_PACING_THRESHOLD_AMPLITUDE: 1 V
MDC_IDC_MSMT_LEADCHNL_RA_PACING_THRESHOLD_PULSEWIDTH: 0.6 MS
MDC_IDC_MSMT_LEADCHNL_RV_IMPEDANCE_VALUE: 559 OHM
MDC_IDC_MSMT_LEADCHNL_RV_PACING_THRESHOLD_AMPLITUDE: 0.7 V
MDC_IDC_MSMT_LEADCHNL_RV_PACING_THRESHOLD_PULSEWIDTH: 0.4 MS
MDC_IDC_PG_IMPLANT_DTM: NORMAL
MDC_IDC_PG_MFG: NORMAL
MDC_IDC_PG_MODEL: NORMAL
MDC_IDC_PG_SERIAL: NORMAL
MDC_IDC_PG_TYPE: NORMAL
MDC_IDC_SESS_CLINIC_NAME: NORMAL
MDC_IDC_SESS_DTM: NORMAL
MDC_IDC_SESS_TYPE: NORMAL
MDC_IDC_SET_BRADY_AT_MODE_SWITCH_MODE: NORMAL
MDC_IDC_SET_BRADY_AT_MODE_SWITCH_RATE: 170 {BEATS}/MIN
MDC_IDC_SET_BRADY_LOWRATE: 60 {BEATS}/MIN
MDC_IDC_SET_BRADY_MAX_SENSOR_RATE: 130 {BEATS}/MIN
MDC_IDC_SET_BRADY_MAX_TRACKING_RATE: 130 {BEATS}/MIN
MDC_IDC_SET_BRADY_MODE: NORMAL
MDC_IDC_SET_BRADY_PAV_DELAY_HIGH: 180 MS
MDC_IDC_SET_BRADY_PAV_DELAY_LOW: 260 MS
MDC_IDC_SET_BRADY_SAV_DELAY_HIGH: 160 MS
MDC_IDC_SET_BRADY_SAV_DELAY_LOW: 230 MS
MDC_IDC_SET_LEADCHNL_RA_PACING_AMPLITUDE: 1.8 V
MDC_IDC_SET_LEADCHNL_RA_PACING_POLARITY: NORMAL
MDC_IDC_SET_LEADCHNL_RA_PACING_PULSEWIDTH: 0.6 MS
MDC_IDC_SET_LEADCHNL_RA_SENSING_ADAPTATION_MODE: NORMAL
MDC_IDC_SET_LEADCHNL_RA_SENSING_POLARITY: NORMAL
MDC_IDC_SET_LEADCHNL_RA_SENSING_SENSITIVITY: 0.5 MV
MDC_IDC_SET_LEADCHNL_RV_PACING_AMPLITUDE: 1.5 V
MDC_IDC_SET_LEADCHNL_RV_PACING_POLARITY: NORMAL
MDC_IDC_SET_LEADCHNL_RV_PACING_PULSEWIDTH: 0.4 MS
MDC_IDC_SET_LEADCHNL_RV_SENSING_ADAPTATION_MODE: NORMAL
MDC_IDC_SET_LEADCHNL_RV_SENSING_POLARITY: NORMAL
MDC_IDC_SET_LEADCHNL_RV_SENSING_SENSITIVITY: 0.6 MV
MDC_IDC_SET_ZONE_DETECTION_INTERVAL: 300 MS
MDC_IDC_SET_ZONE_TYPE: NORMAL
MDC_IDC_SET_ZONE_VENDOR_TYPE: NORMAL
MDC_IDC_STAT_BRADY_DTM_END: NORMAL
MDC_IDC_STAT_BRADY_DTM_START: NORMAL
MDC_IDC_STAT_BRADY_RA_PERCENT_PACED: 60 %
MDC_IDC_STAT_BRADY_RV_PERCENT_PACED: 0 %
MDC_IDC_STAT_EPISODE_RECENT_COUNT: 0
MDC_IDC_STAT_EPISODE_RECENT_COUNT_DTM_END: NORMAL
MDC_IDC_STAT_EPISODE_RECENT_COUNT_DTM_START: NORMAL
MDC_IDC_STAT_EPISODE_TYPE: NORMAL
MDC_IDC_STAT_EPISODE_VENDOR_TYPE: NORMAL
MDC_IDC_STAT_TACHYTHERAPY_ATP_DELIVERED_RECENT: 0
MDC_IDC_STAT_TACHYTHERAPY_ATP_DELIVERED_TOTAL: 0
MDC_IDC_STAT_TACHYTHERAPY_RECENT_DTM_END: NORMAL
MDC_IDC_STAT_TACHYTHERAPY_RECENT_DTM_START: NORMAL
MDC_IDC_STAT_TACHYTHERAPY_SHOCKS_ABORTED_RECENT: 0
MDC_IDC_STAT_TACHYTHERAPY_SHOCKS_ABORTED_TOTAL: 0
MDC_IDC_STAT_TACHYTHERAPY_SHOCKS_DELIVERED_RECENT: 0
MDC_IDC_STAT_TACHYTHERAPY_SHOCKS_DELIVERED_TOTAL: 1
MDC_IDC_STAT_TACHYTHERAPY_TOTAL_DTM_END: NORMAL

## 2022-01-19 ENCOUNTER — HOSPITAL ENCOUNTER (OUTPATIENT)
Dept: CARDIOLOGY | Facility: CLINIC | Age: 87
Discharge: HOME | End: 2022-01-19
Attending: INTERNAL MEDICINE | Admitting: INTERNAL MEDICINE
Payer: MEDICARE

## 2022-01-19 DIAGNOSIS — I42.8 NICM (NONISCHEMIC CARDIOMYOPATHY) (H): ICD-10-CM

## 2022-01-19 DIAGNOSIS — I42.9 IDIOPATHIC CARDIOMYOPATHY (H): ICD-10-CM

## 2022-01-19 DIAGNOSIS — I10 ESSENTIAL HYPERTENSION: ICD-10-CM

## 2022-01-19 LAB — LVEF ECHO: NORMAL

## 2022-01-19 PROCEDURE — 93306 TTE W/DOPPLER COMPLETE: CPT | Mod: 26 | Performed by: INTERNAL MEDICINE

## 2022-01-19 PROCEDURE — 93306 TTE W/DOPPLER COMPLETE: CPT

## 2022-02-03 ENCOUNTER — LAB REQUISITION (OUTPATIENT)
Dept: LAB | Facility: CLINIC | Age: 87
End: 2022-02-03
Payer: MEDICARE

## 2022-02-03 DIAGNOSIS — E11.65 TYPE 2 DIABETES MELLITUS WITH HYPERGLYCEMIA (H): ICD-10-CM

## 2022-02-03 DIAGNOSIS — E78.5 HYPERLIPIDEMIA, UNSPECIFIED: ICD-10-CM

## 2022-02-03 DIAGNOSIS — N18.32 CHRONIC KIDNEY DISEASE, STAGE 3B (H): ICD-10-CM

## 2022-02-03 LAB
ALBUMIN SERPL-MCNC: 3.8 G/DL (ref 3.5–5)
ALP SERPL-CCNC: 48 U/L (ref 45–120)
ALT SERPL W P-5'-P-CCNC: <9 U/L (ref 0–45)
ANION GAP SERPL CALCULATED.3IONS-SCNC: 9 MMOL/L (ref 5–18)
AST SERPL W P-5'-P-CCNC: 25 U/L (ref 0–40)
BILIRUB SERPL-MCNC: 0.4 MG/DL (ref 0–1)
BUN SERPL-MCNC: 29 MG/DL (ref 8–28)
CALCIUM SERPL-MCNC: 9.3 MG/DL (ref 8.5–10.5)
CHLORIDE BLD-SCNC: 108 MMOL/L (ref 98–107)
CHOLEST SERPL-MCNC: 123 MG/DL
CO2 SERPL-SCNC: 22 MMOL/L (ref 22–31)
CREAT SERPL-MCNC: 1.74 MG/DL (ref 0.7–1.3)
FASTING STATUS PATIENT QL REPORTED: ABNORMAL
GFR SERPL CREATININE-BSD FRML MDRD: 38 ML/MIN/1.73M2
GLUCOSE BLD-MCNC: 179 MG/DL (ref 70–125)
HDLC SERPL-MCNC: 34 MG/DL
LDLC SERPL CALC-MCNC: 49 MG/DL
POTASSIUM BLD-SCNC: 4.9 MMOL/L (ref 3.5–5)
PROT SERPL-MCNC: 6.5 G/DL (ref 6–8)
SODIUM SERPL-SCNC: 139 MMOL/L (ref 136–145)
TRIGL SERPL-MCNC: 201 MG/DL

## 2022-02-03 PROCEDURE — 80053 COMPREHEN METABOLIC PANEL: CPT | Mod: ORL | Performed by: FAMILY MEDICINE

## 2022-02-03 PROCEDURE — 80061 LIPID PANEL: CPT | Mod: ORL | Performed by: FAMILY MEDICINE

## 2022-03-14 ENCOUNTER — OFFICE VISIT (OUTPATIENT)
Dept: CARDIOLOGY | Facility: CLINIC | Age: 87
End: 2022-03-14
Payer: MEDICARE

## 2022-03-14 VITALS
HEART RATE: 65 BPM | BODY MASS INDEX: 27.91 KG/M2 | OXYGEN SATURATION: 95 % | SYSTOLIC BLOOD PRESSURE: 82 MMHG | WEIGHT: 189 LBS | DIASTOLIC BLOOD PRESSURE: 40 MMHG | RESPIRATION RATE: 16 BRPM

## 2022-03-14 DIAGNOSIS — Z95.810 DUAL ICD (IMPLANTABLE CARDIOVERTER-DEFIBRILLATOR) IN PLACE: ICD-10-CM

## 2022-03-14 DIAGNOSIS — I10 ESSENTIAL HYPERTENSION: ICD-10-CM

## 2022-03-14 DIAGNOSIS — N18.32 STAGE 3B CHRONIC KIDNEY DISEASE (H): ICD-10-CM

## 2022-03-14 DIAGNOSIS — I42.8 NICM (NONISCHEMIC CARDIOMYOPATHY) (H): Primary | ICD-10-CM

## 2022-03-14 PROCEDURE — 99214 OFFICE O/P EST MOD 30 MIN: CPT | Performed by: INTERNAL MEDICINE

## 2022-03-14 RX ORDER — METOPROLOL SUCCINATE 100 MG/1
100 TABLET, EXTENDED RELEASE ORAL AT BEDTIME
Qty: 90 TABLET | Refills: 3 | Status: SHIPPED | OUTPATIENT
Start: 2022-03-14 | End: 2023-03-22

## 2022-03-14 NOTE — PATIENT INSTRUCTIONS
It was a pleasure to meet with you today.      Below is a summary of your visit.   1. Your blood pressure is lower than it should be an puts you at risk of having a fall. Because of this I will make two changes to your medications.  1. Stop taking spironolactone  2. Decrease your metoprolol to 100 mg once daily. You can cut your 200 mg tablets in half to use them up until you receive the new pills.  2. Continue your other medications without changes.  3. Work on standing from a seated position from a dining room chair without armrests 10 times per day. This will help keep the strength in your legs and hopefully make it easier to stand up from the couch.  4. Follow up with me in about a year or sooner if needed.  5. Call if you develop light headedness or feel like you might pass out.      Please do not hesitate to call the [facility] clinic with any questions or concerns at [phone]. You can also reach my nurse, Mali, during normal business hours at [phone].    Sincerely,

## 2022-03-14 NOTE — LETTER
3/14/2022    García Peña MD  [facility] [address]  [place] [address]    RE: Anthony Tiwari       Dear Colleague,     I had the pleasure of seeing Anthony Tiwari in the [facility].      Thank you, García Schreiber, for asking the [facility] team to see Mr. Anthony Tiwari to Follow Up (cardiomyopathy)        Assessment/Recommendations   Assessment:    1. 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 Dual chamber ICD in situ  3. CKD stage 3b  4. SIRIA on CPAP  5. Hypertension - BP low today    Plan:  1. Decrease metoprolol  2. Discontinue spironolactone  3. Continue other medications without changes  4. Follow up in 1 year or sooner if needed.         History of Present Illness   Mr. Anthony Tiwari is a 86 year old male who presents for follow-up.    Mr. Tiwari has a known non-ischemic cardiomyopathy and was diagnosed with parkinson's disease in the fall of 2021. He is feeling generally well today but has noted some tiredness while shoveling snow this morning. He denies any pre-syncope or syncope. He also has some difficulties standing up from his sofa.       Other than noted above, Mr. Tiwari denies any chest pain/pressure/tightness, shortness of breath at rest or with exertion, light headedness/dizziness, pre-syncope, syncope, lower extremity swelling, palpitations, paroxysmal nocturnal dyspnea (PND), or orthopnea.     Cardiac Problems and Cardiac Diagnostics     Most Recent Cardiac testing:  ECG dated 4/10/21 (personaly reviewed and interpreted): atrial paced rhythm, left axis deviation, LBBB.    ECHO (report reviewed):   TTE 1/19/22  The left ventricle is mildly dilated.  There is normal left ventricular wall thickness. The visual ejection fraction  is 30-40%.  Diastolic Doppler findings (E/E' ratio and/or other parameters) suggest left  ventricular filling pressures are increased.  There is severe global hypokinesia of the  left ventricle.  Normal right ventricle size and systolic function.  There is a catheter/pacemaker lead seen in the right ventricle.  The left atrium is mildly dilated.  No obvious valvular disease.     When compared to previous study on 12-, there is no significant change.  Reviewed both images and compared images to imges.       Medications  Allergies   Current Outpatient Medications   Medication Sig Dispense Refill     aspirin 81 MG EC tablet [ASPIRIN 81 MG EC TABLET] Take 81 mg by mouth bedtime.       atorvastatin (LIPITOR) 20 MG tablet [ATORVASTATIN (LIPITOR) 20 MG TABLET] Take 20 mg by mouth at bedtime.       carbidopa-levodopa (SINEMET)  MG tablet Take 100 tablets by mouth 3 times daily        fenofibrate (TRICOR) 145 MG tablet [FENOFIBRATE (TRICOR) 145 MG TABLET] Take 145 mg by mouth daily.       insulin NPH-insulin regular (NOVOLIN 70-30) 100 unit/mL (70-30) injection [INSULIN NPH-INSULIN REGULAR (NOVOLIN 70-30) 100 UNIT/ML (70-30) INJECTION] Inject 12 Units under the skin 2 (two) times a day before meals.              lisinopril (PRINIVIL,ZESTRIL) 2.5 MG tablet [LISINOPRIL (PRINIVIL,ZESTRIL) 2.5 MG TABLET] Take 2.5 mg by mouth bedtime.        metFORMIN (GLUCOPHAGE) 500 MG tablet Take 1,000 mg by mouth 2 times daily (with meals)   2     metoprolol succinate ER (TOPROL-XL) 100 MG 24 hr tablet Take 1 tablet (100 mg) by mouth At Bedtime 90 tablet 3     omeprazole (PRILOSEC OTC) 20 MG tablet [OMEPRAZOLE (PRILOSEC OTC) 20 MG TABLET] Take 20 mg by mouth bedtime.         Allergies   Allergen Reactions     Simvastatin Other (See Comments)     Elevated CPK        Physical Examination Review of Systems   Vitals: BP (!) 82/40 (BP Location: Right arm, Patient Position: Sitting, Cuff Size: Adult Regular)   Pulse 65   Resp 16   Wt 85.7 kg (189 lb)   SpO2 95%   BMI 27.91 kg/m    BMI= Body mass index is 27.91 kg/m .  Wt Readings from Last 3 Encounters:   03/14/22 85.7 kg (189 lb)   01/14/21 83.9 kg (185  lb)   11/11/19 84 kg (185 lb 4 oz)       General Appearance:   Pleasant male, appears stated age. no acute distress, normal body habitus   ENT/Mouth: membranes moist, no apparent gingival bleeding.      EYES:  no scleral icterus, normal conjunctivae   Neck: no carotid bruits. supple   Respiratory:   lungs are clear to auscultation, no rales or wheezing, equal chest wall expansion    Cardiovascular:   Regular rhythm, normal rate. Normal first and second heart sounds with no murmurs, rubs, or gallops; the carotid, radial and posterior tibial pulses are intact, Jugular venous pressure normal, no edema bilaterally    Abdomen/GI:  Soft, non-tender   Extremities: no cyanosis or clubbing   Skin: no xanthelasma, warm.    Heme/lymph/ Immunology No apparent bleeding noted.   Neurologic: Alert and oriented. no tremors   Psychiatric: Pleasant, calm, appropriate affect.         Please refer above for cardiac ROS details.       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 Cody at [facility], getting chemo again in 2017     Nonocclusive coronary atherosclerosis of native coronary artery     nonobstuctive by cor angio       SIRIA on CPAP 2013    he sees someone at [facility]     Other primary cardiomyopathies 2013    EF was as low as 23%     Statin intolerance 02/21/2019    history of elevated CPK on both simvastatin but less so on atorvastatin.     Type 2 diabetes mellitus (H)         Past Surgical History:   Past Surgical History:   Procedure Laterality Date     CARDIAC DEFIBRILLATOR PLACEMENT   07/24/2013    Dr. Rodriguez     CATARACT EXTRACTION W/ INTRAOCULAR LENS  IMPLANT, BILATERAL  05/2018     CYSTOSCOPY W/ LITHOLAPAXY / EHL N/A 03/19/2019    Procedure: CYSTOLITHOLAPAXY;  Surgeon: Yunior Moore MD;  Location: [facility];  Service: Urology     HERNIA REPAIR Left      CO CYSTO/URETERO W/LITHOTRIPSY &INDWELL STENT INSRT Right 03/19/2019    Procedure: CYSTOSCOPY RIGHT URETEROSCOPY LASER LITHOTRIPSY;  Surgeon: Yunior Moore MD;  Location: [facility];  Service: Urology     TOTAL HIP ARTHROPLASTY Right 2012     URETEROSCOPY          Family History:   Family History   Problem Relation Age of Onset     Rheumatic Heart Disease Brother         half brother     Dementia Brother      Cerebrovascular Disease Father      Rheumatic Heart Disease Sister      Sudden Death Sister 67.00        no autopsy     CABG Sister 81.00     No Known Problems Daughter      No Known Problems Daughter      No Known Problems Daughter      No Known Problems Granddaughter      No Known Problems Grandson      Urolithiasis No family hx of      Clotting Disorder No family hx of      Gout No family hx of       Social History:   Social History     Socioeconomic History     Marital status:      Spouse name: Not on file     Number of children: 3     Years of education: Not on file     Highest education level: Not on file   Occupational History     Not on file   Tobacco Use     Smoking status: Never Smoker     Smokeless tobacco: Never Used   Substance and Sexual Activity     Alcohol use: No     Drug use: No     Sexual activity: Not on file   Other Topics Concern     Not on file   Social History Narrative    He used to walk for an hour most days and still takes care of his lawn and snow removal, though his grandsons are helping more with the lawn in 2018. 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 He is walking less in 2019. His wife,  Rebeka, is 6 years his tu and is providing daytime  and overnight  for their daughter Loree's fraternal twins, Estrella and Jovanny, since 2019. Anthony spends 5 hours a day with Loree and the twins.     Social Determinants of Health     Financial Resource Strain: Not on file   Food Insecurity: Not on file   Transportation Needs: Not on file   Physical Activity: Not on file   Stress: Not on file   Social Connections: Not on file   Intimate Partner Violence: Not on file   Housing Stability: Not on file            Lab Results    Chemistry/lipid CBC Cardiac Enzymes/BNP/TSH/INR   Lab Results   Component Value Date    CHOL 123 02/03/2022    HDL 34 (L) 02/03/2022    TRIG 201 (H) 02/03/2022    BUN 29 (H) 02/03/2022     02/03/2022    CO2 22 02/03/2022    Lab Results   Component Value Date    WBC 7.2 04/10/2021    HGB 13.4 (L) 04/10/2021    HCT 40.9 04/10/2021    MCV 91 04/10/2021     04/10/2021    Lab Results   Component Value Date    TROPONINI 0.02 04/10/2021     (H) 06/14/2021

## 2022-03-14 NOTE — PROGRESS NOTES
Thank you, García Schreiber, for asking the [facility] team to see Mr. Anthony Tiwari to Follow Up (cardiomyopathy)        Assessment/Recommendations   Assessment:    1. 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 Dual chamber ICD in situ  3. CKD stage 3b  4. SIRIA on CPAP  5. Hypertension - BP low today    Plan:  1. Decrease metoprolol  2. Discontinue spironolactone  3. Continue other medications without changes  4. Follow up in 1 year or sooner if needed.         History of Present Illness   Mr. Anthony Tiwari is a 86 year old male who presents for follow-up.    Mr. Tiwari has a known non-ischemic cardiomyopathy and was diagnosed with parkinson's disease in the fall of 2021. He is feeling generally well today but has noted some tiredness while shoveling snow this morning. He denies any pre-syncope or syncope. He also has some difficulties standing up from his sofa.       Other than noted above, Mr. Tiwari denies any chest pain/pressure/tightness, shortness of breath at rest or with exertion, light headedness/dizziness, pre-syncope, syncope, lower extremity swelling, palpitations, paroxysmal nocturnal dyspnea (PND), or orthopnea.     Cardiac Problems and Cardiac Diagnostics     Most Recent Cardiac testing:  ECG dated 4/10/21 (personaly reviewed and interpreted): atrial paced rhythm, left axis deviation, LBBB.    ECHO (report reviewed):   TTE 1/19/22  The left ventricle is mildly dilated.  There is normal left ventricular wall thickness. The visual ejection fraction  is 30-40%.  Diastolic Doppler findings (E/E' ratio and/or other parameters) suggest left  ventricular filling pressures are increased.  There is severe global hypokinesia of the left ventricle.  Normal right ventricle size and systolic function.  There is a catheter/pacemaker lead seen in the right ventricle.  The left atrium is mildly dilated.  No obvious valvular disease.     When compared to previous study on  12-, there is no significant change.  Reviewed both images and compared images to imges.       Medications  Allergies   Current Outpatient Medications   Medication Sig Dispense Refill     aspirin 81 MG EC tablet [ASPIRIN 81 MG EC TABLET] Take 81 mg by mouth bedtime.       atorvastatin (LIPITOR) 20 MG tablet [ATORVASTATIN (LIPITOR) 20 MG TABLET] Take 20 mg by mouth at bedtime.       carbidopa-levodopa (SINEMET)  MG tablet Take 100 tablets by mouth 3 times daily        fenofibrate (TRICOR) 145 MG tablet [FENOFIBRATE (TRICOR) 145 MG TABLET] Take 145 mg by mouth daily.       insulin NPH-insulin regular (NOVOLIN 70-30) 100 unit/mL (70-30) injection [INSULIN NPH-INSULIN REGULAR (NOVOLIN 70-30) 100 UNIT/ML (70-30) INJECTION] Inject 12 Units under the skin 2 (two) times a day before meals.              lisinopril (PRINIVIL,ZESTRIL) 2.5 MG tablet [LISINOPRIL (PRINIVIL,ZESTRIL) 2.5 MG TABLET] Take 2.5 mg by mouth bedtime.        metFORMIN (GLUCOPHAGE) 500 MG tablet Take 1,000 mg by mouth 2 times daily (with meals)   2     metoprolol succinate ER (TOPROL-XL) 100 MG 24 hr tablet Take 1 tablet (100 mg) by mouth At Bedtime 90 tablet 3     omeprazole (PRILOSEC OTC) 20 MG tablet [OMEPRAZOLE (PRILOSEC OTC) 20 MG TABLET] Take 20 mg by mouth bedtime.         Allergies   Allergen Reactions     Simvastatin Other (See Comments)     Elevated CPK        Physical Examination Review of Systems   Vitals: BP (!) 82/40 (BP Location: Right arm, Patient Position: Sitting, Cuff Size: Adult Regular)   Pulse 65   Resp 16   Wt 85.7 kg (189 lb)   SpO2 95%   BMI 27.91 kg/m    BMI= Body mass index is 27.91 kg/m .  Wt Readings from Last 3 Encounters:   03/14/22 85.7 kg (189 lb)   01/14/21 83.9 kg (185 lb)   11/11/19 84 kg (185 lb 4 oz)       General Appearance:   Pleasant male, appears stated age. no acute distress, normal body habitus   ENT/Mouth: membranes moist, no apparent gingival bleeding.      EYES:  no scleral icterus, normal  conjunctivae   Neck: no carotid bruits. supple   Respiratory:   lungs are clear to auscultation, no rales or wheezing, equal chest wall expansion    Cardiovascular:   Regular rhythm, normal rate. Normal first and second heart sounds with no murmurs, rubs, or gallops; the carotid, radial and posterior tibial pulses are intact, Jugular venous pressure normal, no edema bilaterally    Abdomen/GI:  Soft, non-tender   Extremities: no cyanosis or clubbing   Skin: no xanthelasma, warm.    Heme/lymph/ Immunology No apparent bleeding noted.   Neurologic: Alert and oriented. no tremors   Psychiatric: Pleasant, calm, appropriate affect.         Please refer above for cardiac ROS details.       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 Cody at [facility], getting chemo again in 2017     Nonocclusive coronary atherosclerosis of native coronary artery     nonobstuctive by cor angio       SIRIA on CPAP 2013    he sees someone at [facility]     Other primary cardiomyopathies 2013    EF was as low as 23%     Statin intolerance 02/21/2019    history of elevated CPK on both simvastatin but less so on atorvastatin.     Type 2 diabetes mellitus (H)         Past Surgical History:   Past Surgical History:   Procedure Laterality Date     CARDIAC DEFIBRILLATOR PLACEMENT  07/24/2013    Dr. Rodriguez     CATARACT EXTRACTION W/ INTRAOCULAR LENS  IMPLANT, BILATERAL  05/2018     CYSTOSCOPY W/ LITHOLAPAXY / EHL N/A 03/19/2019    Procedure: CYSTOLITHOLAPAXY;  Surgeon: Yunior Moore MD;  Location: [facility]  OR;  Service: Urology     HERNIA REPAIR Left      MA CYSTO/URETERO W/LITHOTRIPSY &INDWELL STENT INSRT Right 03/19/2019    Procedure: CYSTOSCOPY RIGHT URETEROSCOPY LASER LITHOTRIPSY;  Surgeon: Yunior Moore MD;  Location: [facility] OR;  Service: Urology     TOTAL HIP ARTHROPLASTY Right 2012     URETEROSCOPY          Family History:   Family History   Problem Relation Age of Onset     Rheumatic Heart Disease Brother         half brother     Dementia Brother      Cerebrovascular Disease Father      Rheumatic Heart Disease Sister      Sudden Death Sister 67.00        no autopsy     CABG Sister 81.00     No Known Problems Daughter      No Known Problems Daughter      No Known Problems Daughter      No Known Problems Granddaughter      No Known Problems Grandson      Urolithiasis No family hx of      Clotting Disorder No family hx of      Gout No family hx of       Social History:   Social History     Socioeconomic History     Marital status:      Spouse name: Not on file     Number of children: 3     Years of education: Not on file     Highest education level: Not on file   Occupational History     Not on file   Tobacco Use     Smoking status: Never Smoker     Smokeless tobacco: Never Used   Substance and Sexual Activity     Alcohol use: No     Drug use: No     Sexual activity: Not on file   Other Topics Concern     Not on file   Social History Narrative    He used to walk for an hour most days and still takes care of his lawn and snow removal, though his grandsons are helping more with the lawn in 2018. He volunteers at the [employer] at [place] since 2000. He is walking less in 2019. His wife,  Rebeka, is 6 years his tu and is providing daytime and overnight  for their daughter Loree's fraternal twins, Estrella and Jovanny, since 2019. Anthony spends 5 hours a day with Loree and the twins.     Social Determinants of Health     Financial Resource Strain: Not on file   Food  Insecurity: Not on file   Transportation Needs: Not on file   Physical Activity: Not on file   Stress: Not on file   Social Connections: Not on file   Intimate Partner Violence: Not on file   Housing Stability: Not on file            Lab Results    Chemistry/lipid CBC Cardiac Enzymes/BNP/TSH/INR   Lab Results   Component Value Date    CHOL 123 02/03/2022    HDL 34 (L) 02/03/2022    TRIG 201 (H) 02/03/2022    BUN 29 (H) 02/03/2022     02/03/2022    CO2 22 02/03/2022    Lab Results   Component Value Date    WBC 7.2 04/10/2021    HGB 13.4 (L) 04/10/2021    HCT 40.9 04/10/2021    MCV 91 04/10/2021     04/10/2021    Lab Results   Component Value Date    TROPONINI 0.02 04/10/2021     (H) 06/14/2021

## 2022-04-15 ENCOUNTER — ANCILLARY PROCEDURE (OUTPATIENT)
Dept: CARDIOLOGY | Facility: CLINIC | Age: 87
End: 2022-04-15
Attending: INTERNAL MEDICINE
Payer: MEDICARE

## 2022-04-15 DIAGNOSIS — I42.9 IDIOPATHIC CARDIOMYOPATHY (H): ICD-10-CM

## 2022-04-15 DIAGNOSIS — Z95.810 DUAL ICD (IMPLANTABLE CARDIOVERTER-DEFIBRILLATOR) IN PLACE: ICD-10-CM

## 2022-04-15 PROCEDURE — 93295 DEV INTERROG REMOTE 1/2/MLT: CPT | Performed by: INTERNAL MEDICINE

## 2022-04-15 PROCEDURE — 93296 REM INTERROG EVL PM/IDS: CPT | Performed by: INTERNAL MEDICINE

## 2022-04-16 LAB
MDC_IDC_EPISODE_DTM: NORMAL
MDC_IDC_EPISODE_DTM: NORMAL
MDC_IDC_EPISODE_ID: NORMAL
MDC_IDC_EPISODE_ID: NORMAL
MDC_IDC_EPISODE_TYPE: NORMAL
MDC_IDC_EPISODE_TYPE: NORMAL
MDC_IDC_LEAD_IMPLANT_DT: NORMAL
MDC_IDC_LEAD_IMPLANT_DT: NORMAL
MDC_IDC_LEAD_LOCATION: NORMAL
MDC_IDC_LEAD_LOCATION: NORMAL
MDC_IDC_LEAD_LOCATION_DETAIL_1: NORMAL
MDC_IDC_LEAD_LOCATION_DETAIL_1: NORMAL
MDC_IDC_LEAD_MFG: NORMAL
MDC_IDC_LEAD_MFG: NORMAL
MDC_IDC_LEAD_MODEL: NORMAL
MDC_IDC_LEAD_MODEL: NORMAL
MDC_IDC_LEAD_POLARITY_TYPE: NORMAL
MDC_IDC_LEAD_POLARITY_TYPE: NORMAL
MDC_IDC_LEAD_SERIAL: NORMAL
MDC_IDC_LEAD_SERIAL: NORMAL
MDC_IDC_MSMT_BATTERY_DTM: NORMAL
MDC_IDC_MSMT_BATTERY_REMAINING_LONGEVITY: 42 MO
MDC_IDC_MSMT_BATTERY_REMAINING_PERCENTAGE: 48 %
MDC_IDC_MSMT_BATTERY_STATUS: NORMAL
MDC_IDC_MSMT_CAP_CHARGE_DTM: NORMAL
MDC_IDC_MSMT_CAP_CHARGE_DTM: NORMAL
MDC_IDC_MSMT_CAP_CHARGE_ENERGY: 17 J
MDC_IDC_MSMT_CAP_CHARGE_TIME: 11.9 S
MDC_IDC_MSMT_CAP_CHARGE_TIME: 3.1 S
MDC_IDC_MSMT_CAP_CHARGE_TYPE: NORMAL
MDC_IDC_MSMT_CAP_CHARGE_TYPE: NORMAL
MDC_IDC_MSMT_LEADCHNL_RA_IMPEDANCE_VALUE: 522 OHM
MDC_IDC_MSMT_LEADCHNL_RA_PACING_THRESHOLD_AMPLITUDE: 1 V
MDC_IDC_MSMT_LEADCHNL_RA_PACING_THRESHOLD_PULSEWIDTH: 0.6 MS
MDC_IDC_MSMT_LEADCHNL_RV_IMPEDANCE_VALUE: 565 OHM
MDC_IDC_MSMT_LEADCHNL_RV_PACING_THRESHOLD_AMPLITUDE: 0.7 V
MDC_IDC_MSMT_LEADCHNL_RV_PACING_THRESHOLD_PULSEWIDTH: 0.4 MS
MDC_IDC_PG_IMPLANT_DTM: NORMAL
MDC_IDC_PG_MFG: NORMAL
MDC_IDC_PG_MODEL: NORMAL
MDC_IDC_PG_SERIAL: NORMAL
MDC_IDC_PG_TYPE: NORMAL
MDC_IDC_SESS_CLINIC_NAME: NORMAL
MDC_IDC_SESS_DTM: NORMAL
MDC_IDC_SESS_TYPE: NORMAL
MDC_IDC_SET_BRADY_AT_MODE_SWITCH_MODE: NORMAL
MDC_IDC_SET_BRADY_AT_MODE_SWITCH_RATE: 170 {BEATS}/MIN
MDC_IDC_SET_BRADY_LOWRATE: 60 {BEATS}/MIN
MDC_IDC_SET_BRADY_MAX_SENSOR_RATE: 130 {BEATS}/MIN
MDC_IDC_SET_BRADY_MAX_TRACKING_RATE: 130 {BEATS}/MIN
MDC_IDC_SET_BRADY_MODE: NORMAL
MDC_IDC_SET_BRADY_PAV_DELAY_HIGH: 180 MS
MDC_IDC_SET_BRADY_PAV_DELAY_LOW: 260 MS
MDC_IDC_SET_BRADY_SAV_DELAY_HIGH: 160 MS
MDC_IDC_SET_BRADY_SAV_DELAY_LOW: 230 MS
MDC_IDC_SET_LEADCHNL_RA_PACING_AMPLITUDE: 1.8 V
MDC_IDC_SET_LEADCHNL_RA_PACING_POLARITY: NORMAL
MDC_IDC_SET_LEADCHNL_RA_PACING_PULSEWIDTH: 0.6 MS
MDC_IDC_SET_LEADCHNL_RA_SENSING_ADAPTATION_MODE: NORMAL
MDC_IDC_SET_LEADCHNL_RA_SENSING_POLARITY: NORMAL
MDC_IDC_SET_LEADCHNL_RA_SENSING_SENSITIVITY: 0.5 MV
MDC_IDC_SET_LEADCHNL_RV_PACING_AMPLITUDE: 1.5 V
MDC_IDC_SET_LEADCHNL_RV_PACING_POLARITY: NORMAL
MDC_IDC_SET_LEADCHNL_RV_PACING_PULSEWIDTH: 0.4 MS
MDC_IDC_SET_LEADCHNL_RV_SENSING_ADAPTATION_MODE: NORMAL
MDC_IDC_SET_LEADCHNL_RV_SENSING_POLARITY: NORMAL
MDC_IDC_SET_LEADCHNL_RV_SENSING_SENSITIVITY: 0.6 MV
MDC_IDC_SET_ZONE_DETECTION_INTERVAL: 300 MS
MDC_IDC_SET_ZONE_TYPE: NORMAL
MDC_IDC_SET_ZONE_VENDOR_TYPE: NORMAL
MDC_IDC_STAT_BRADY_DTM_END: NORMAL
MDC_IDC_STAT_BRADY_DTM_START: NORMAL
MDC_IDC_STAT_BRADY_RA_PERCENT_PACED: 62 %
MDC_IDC_STAT_BRADY_RV_PERCENT_PACED: 0 %
MDC_IDC_STAT_EPISODE_RECENT_COUNT: 0
MDC_IDC_STAT_EPISODE_RECENT_COUNT_DTM_END: NORMAL
MDC_IDC_STAT_EPISODE_RECENT_COUNT_DTM_START: NORMAL
MDC_IDC_STAT_EPISODE_TYPE: NORMAL
MDC_IDC_STAT_EPISODE_VENDOR_TYPE: NORMAL
MDC_IDC_STAT_TACHYTHERAPY_ATP_DELIVERED_RECENT: 0
MDC_IDC_STAT_TACHYTHERAPY_ATP_DELIVERED_TOTAL: 0
MDC_IDC_STAT_TACHYTHERAPY_RECENT_DTM_END: NORMAL
MDC_IDC_STAT_TACHYTHERAPY_RECENT_DTM_START: NORMAL
MDC_IDC_STAT_TACHYTHERAPY_SHOCKS_ABORTED_RECENT: 0
MDC_IDC_STAT_TACHYTHERAPY_SHOCKS_ABORTED_TOTAL: 0
MDC_IDC_STAT_TACHYTHERAPY_SHOCKS_DELIVERED_RECENT: 0
MDC_IDC_STAT_TACHYTHERAPY_SHOCKS_DELIVERED_TOTAL: 1
MDC_IDC_STAT_TACHYTHERAPY_TOTAL_DTM_END: NORMAL

## 2022-08-02 ENCOUNTER — ANCILLARY PROCEDURE (OUTPATIENT)
Dept: CARDIOLOGY | Facility: CLINIC | Age: 87
End: 2022-08-02
Attending: INTERNAL MEDICINE
Payer: MEDICARE

## 2022-08-02 DIAGNOSIS — I42.9 IDIOPATHIC CARDIOMYOPATHY (H): ICD-10-CM

## 2022-08-02 DIAGNOSIS — Z95.810 DUAL ICD (IMPLANTABLE CARDIOVERTER-DEFIBRILLATOR) IN PLACE: ICD-10-CM

## 2022-08-02 LAB
MDC_IDC_EPISODE_DTM: NORMAL
MDC_IDC_EPISODE_ID: NORMAL
MDC_IDC_EPISODE_TYPE: NORMAL
MDC_IDC_LEAD_IMPLANT_DT: NORMAL
MDC_IDC_LEAD_IMPLANT_DT: NORMAL
MDC_IDC_LEAD_LOCATION: NORMAL
MDC_IDC_LEAD_LOCATION: NORMAL
MDC_IDC_LEAD_LOCATION_DETAIL_1: NORMAL
MDC_IDC_LEAD_LOCATION_DETAIL_1: NORMAL
MDC_IDC_LEAD_MFG: NORMAL
MDC_IDC_LEAD_MFG: NORMAL
MDC_IDC_LEAD_MODEL: NORMAL
MDC_IDC_LEAD_MODEL: NORMAL
MDC_IDC_LEAD_POLARITY_TYPE: NORMAL
MDC_IDC_LEAD_POLARITY_TYPE: NORMAL
MDC_IDC_LEAD_SERIAL: NORMAL
MDC_IDC_LEAD_SERIAL: NORMAL
MDC_IDC_MSMT_BATTERY_DTM: NORMAL
MDC_IDC_MSMT_BATTERY_REMAINING_LONGEVITY: 36 MO
MDC_IDC_MSMT_BATTERY_REMAINING_PERCENTAGE: 44 %
MDC_IDC_MSMT_BATTERY_STATUS: NORMAL
MDC_IDC_MSMT_CAP_CHARGE_DTM: NORMAL
MDC_IDC_MSMT_CAP_CHARGE_DTM: NORMAL
MDC_IDC_MSMT_CAP_CHARGE_ENERGY: 17 J
MDC_IDC_MSMT_CAP_CHARGE_TIME: 11.9 S
MDC_IDC_MSMT_CAP_CHARGE_TIME: 3.1 S
MDC_IDC_MSMT_CAP_CHARGE_TYPE: NORMAL
MDC_IDC_MSMT_CAP_CHARGE_TYPE: NORMAL
MDC_IDC_MSMT_LEADCHNL_RA_IMPEDANCE_VALUE: 516 OHM
MDC_IDC_MSMT_LEADCHNL_RA_PACING_THRESHOLD_AMPLITUDE: 1 V
MDC_IDC_MSMT_LEADCHNL_RA_PACING_THRESHOLD_PULSEWIDTH: 0.6 MS
MDC_IDC_MSMT_LEADCHNL_RV_IMPEDANCE_VALUE: 517 OHM
MDC_IDC_MSMT_LEADCHNL_RV_PACING_THRESHOLD_AMPLITUDE: 0.7 V
MDC_IDC_MSMT_LEADCHNL_RV_PACING_THRESHOLD_PULSEWIDTH: 0.4 MS
MDC_IDC_PG_IMPLANT_DTM: NORMAL
MDC_IDC_PG_MFG: NORMAL
MDC_IDC_PG_MODEL: NORMAL
MDC_IDC_PG_SERIAL: NORMAL
MDC_IDC_PG_TYPE: NORMAL
MDC_IDC_SESS_CLINIC_NAME: NORMAL
MDC_IDC_SESS_DTM: NORMAL
MDC_IDC_SESS_TYPE: NORMAL
MDC_IDC_SET_BRADY_AT_MODE_SWITCH_MODE: NORMAL
MDC_IDC_SET_BRADY_AT_MODE_SWITCH_RATE: 170 {BEATS}/MIN
MDC_IDC_SET_BRADY_LOWRATE: 60 {BEATS}/MIN
MDC_IDC_SET_BRADY_MAX_SENSOR_RATE: 130 {BEATS}/MIN
MDC_IDC_SET_BRADY_MAX_TRACKING_RATE: 130 {BEATS}/MIN
MDC_IDC_SET_BRADY_MODE: NORMAL
MDC_IDC_SET_BRADY_PAV_DELAY_HIGH: 180 MS
MDC_IDC_SET_BRADY_PAV_DELAY_LOW: 260 MS
MDC_IDC_SET_BRADY_SAV_DELAY_HIGH: 160 MS
MDC_IDC_SET_BRADY_SAV_DELAY_LOW: 230 MS
MDC_IDC_SET_LEADCHNL_RA_PACING_AMPLITUDE: 1.8 V
MDC_IDC_SET_LEADCHNL_RA_PACING_POLARITY: NORMAL
MDC_IDC_SET_LEADCHNL_RA_PACING_PULSEWIDTH: 0.6 MS
MDC_IDC_SET_LEADCHNL_RA_SENSING_ADAPTATION_MODE: NORMAL
MDC_IDC_SET_LEADCHNL_RA_SENSING_POLARITY: NORMAL
MDC_IDC_SET_LEADCHNL_RA_SENSING_SENSITIVITY: 0.5 MV
MDC_IDC_SET_LEADCHNL_RV_PACING_AMPLITUDE: 1.5 V
MDC_IDC_SET_LEADCHNL_RV_PACING_POLARITY: NORMAL
MDC_IDC_SET_LEADCHNL_RV_PACING_PULSEWIDTH: 0.4 MS
MDC_IDC_SET_LEADCHNL_RV_SENSING_ADAPTATION_MODE: NORMAL
MDC_IDC_SET_LEADCHNL_RV_SENSING_POLARITY: NORMAL
MDC_IDC_SET_LEADCHNL_RV_SENSING_SENSITIVITY: 0.6 MV
MDC_IDC_SET_ZONE_DETECTION_INTERVAL: 300 MS
MDC_IDC_SET_ZONE_TYPE: NORMAL
MDC_IDC_SET_ZONE_VENDOR_TYPE: NORMAL
MDC_IDC_STAT_BRADY_DTM_END: NORMAL
MDC_IDC_STAT_BRADY_DTM_START: NORMAL
MDC_IDC_STAT_BRADY_RA_PERCENT_PACED: 64 %
MDC_IDC_STAT_BRADY_RV_PERCENT_PACED: 0 %
MDC_IDC_STAT_EPISODE_RECENT_COUNT: 0
MDC_IDC_STAT_EPISODE_RECENT_COUNT: 1
MDC_IDC_STAT_EPISODE_RECENT_COUNT_DTM_END: NORMAL
MDC_IDC_STAT_EPISODE_RECENT_COUNT_DTM_START: NORMAL
MDC_IDC_STAT_EPISODE_TYPE: NORMAL
MDC_IDC_STAT_EPISODE_VENDOR_TYPE: NORMAL
MDC_IDC_STAT_TACHYTHERAPY_ATP_DELIVERED_RECENT: 0
MDC_IDC_STAT_TACHYTHERAPY_ATP_DELIVERED_TOTAL: 0
MDC_IDC_STAT_TACHYTHERAPY_RECENT_DTM_END: NORMAL
MDC_IDC_STAT_TACHYTHERAPY_RECENT_DTM_START: NORMAL
MDC_IDC_STAT_TACHYTHERAPY_SHOCKS_ABORTED_RECENT: 0
MDC_IDC_STAT_TACHYTHERAPY_SHOCKS_ABORTED_TOTAL: 0
MDC_IDC_STAT_TACHYTHERAPY_SHOCKS_DELIVERED_RECENT: 0
MDC_IDC_STAT_TACHYTHERAPY_SHOCKS_DELIVERED_TOTAL: 1
MDC_IDC_STAT_TACHYTHERAPY_TOTAL_DTM_END: NORMAL

## 2022-08-02 PROCEDURE — 93295 DEV INTERROG REMOTE 1/2/MLT: CPT | Performed by: INTERNAL MEDICINE

## 2022-08-02 PROCEDURE — 93296 REM INTERROG EVL PM/IDS: CPT | Performed by: INTERNAL MEDICINE

## 2022-08-04 ENCOUNTER — LAB REQUISITION (OUTPATIENT)
Dept: LAB | Facility: CLINIC | Age: 87
End: 2022-08-04
Payer: MEDICARE

## 2022-08-04 DIAGNOSIS — E11.40 TYPE 2 DIABETES MELLITUS WITH DIABETIC NEUROPATHY, UNSPECIFIED (H): ICD-10-CM

## 2022-08-04 LAB
ANION GAP SERPL CALCULATED.3IONS-SCNC: 11 MMOL/L (ref 7–15)
BUN SERPL-MCNC: 26.7 MG/DL (ref 8–23)
CALCIUM SERPL-MCNC: 9 MG/DL (ref 8.8–10.2)
CHLORIDE SERPL-SCNC: 104 MMOL/L (ref 98–107)
CREAT SERPL-MCNC: 1.4 MG/DL (ref 0.67–1.17)
DEPRECATED HCO3 PLAS-SCNC: 23 MMOL/L (ref 22–29)
GFR SERPL CREATININE-BSD FRML MDRD: 49 ML/MIN/1.73M2
GLUCOSE SERPL-MCNC: 148 MG/DL (ref 70–99)
POTASSIUM SERPL-SCNC: 5.1 MMOL/L (ref 3.4–5.3)
SODIUM SERPL-SCNC: 138 MMOL/L (ref 136–145)

## 2022-08-04 PROCEDURE — 80048 BASIC METABOLIC PNL TOTAL CA: CPT | Mod: ORL | Performed by: PHYSICIAN ASSISTANT

## 2022-09-12 ENCOUNTER — TELEPHONE (OUTPATIENT)
Dept: CARDIOLOGY | Facility: CLINIC | Age: 87
End: 2022-09-12

## 2022-09-12 NOTE — PROGRESS NOTES
Documentation only encounter to record results of depression screening.  Depression screening completed via [employer] (formally, [employer]).  See flow sheet for screening.     Yes...

## 2022-09-12 NOTE — TELEPHONE ENCOUNTER
Spoke with patient regarding call back request. Confirmed spironolactone dc'd on 03/22. No further questions or concerns noted. -DINORA

## 2022-09-12 NOTE — TELEPHONE ENCOUNTER
[facility] Call Center    Phone Message    May a detailed message be left on voicemail: yes     Reason for Call: Medication Question or concern regarding medication   Prescription Clarification  Name of Medication: spironolactone (ALDACTONE) 25 MG tablet   Prescribing Provider: BUZZ   Pharmacy:    What on the order needs clarification? The patient wants to know if he is still supposed to be taking this as he is currently taking 0.5 tablet 12.5 MG daily?  Please call pt to verify if he should be taking this       Action Taken: Other: Cardiology    Travel Screening: Not Applicable

## 2022-11-03 DIAGNOSIS — I42.8 NICM (NONISCHEMIC CARDIOMYOPATHY) (H): ICD-10-CM

## 2022-11-03 DIAGNOSIS — Z95.810 DUAL ICD (IMPLANTABLE CARDIOVERTER-DEFIBRILLATOR) IN PLACE: Primary | ICD-10-CM

## 2022-11-15 ENCOUNTER — ANCILLARY PROCEDURE (OUTPATIENT)
Dept: CARDIOLOGY | Facility: CLINIC | Age: 87
End: 2022-11-15
Attending: INTERNAL MEDICINE
Payer: MEDICARE

## 2022-11-15 DIAGNOSIS — Z95.810 ICD (IMPLANTABLE CARDIOVERTER-DEFIBRILLATOR) IN PLACE: ICD-10-CM

## 2022-11-15 DIAGNOSIS — I42.8 NON-ISCHEMIC CARDIOMYOPATHY (H): ICD-10-CM

## 2022-11-20 LAB
MDC_IDC_LEAD_IMPLANT_DT: NORMAL
MDC_IDC_LEAD_IMPLANT_DT: NORMAL
MDC_IDC_LEAD_LOCATION: NORMAL
MDC_IDC_LEAD_LOCATION: NORMAL
MDC_IDC_LEAD_LOCATION_DETAIL_1: NORMAL
MDC_IDC_LEAD_LOCATION_DETAIL_1: NORMAL
MDC_IDC_LEAD_MFG: NORMAL
MDC_IDC_LEAD_MFG: NORMAL
MDC_IDC_LEAD_MODEL: NORMAL
MDC_IDC_LEAD_MODEL: NORMAL
MDC_IDC_LEAD_POLARITY_TYPE: NORMAL
MDC_IDC_LEAD_POLARITY_TYPE: NORMAL
MDC_IDC_LEAD_SERIAL: NORMAL
MDC_IDC_LEAD_SERIAL: NORMAL
MDC_IDC_MSMT_BATTERY_DTM: NORMAL
MDC_IDC_MSMT_BATTERY_REMAINING_LONGEVITY: 36 MO
MDC_IDC_MSMT_BATTERY_REMAINING_PERCENTAGE: 42 %
MDC_IDC_MSMT_BATTERY_STATUS: NORMAL
MDC_IDC_MSMT_CAP_CHARGE_DTM: NORMAL
MDC_IDC_MSMT_CAP_CHARGE_DTM: NORMAL
MDC_IDC_MSMT_CAP_CHARGE_ENERGY: 17 J
MDC_IDC_MSMT_CAP_CHARGE_TIME: 12 S
MDC_IDC_MSMT_CAP_CHARGE_TIME: 3.1 S
MDC_IDC_MSMT_CAP_CHARGE_TYPE: NORMAL
MDC_IDC_MSMT_CAP_CHARGE_TYPE: NORMAL
MDC_IDC_MSMT_LEADCHNL_RA_IMPEDANCE_VALUE: 532 OHM
MDC_IDC_MSMT_LEADCHNL_RA_PACING_THRESHOLD_AMPLITUDE: 1 V
MDC_IDC_MSMT_LEADCHNL_RA_PACING_THRESHOLD_PULSEWIDTH: 0.6 MS
MDC_IDC_MSMT_LEADCHNL_RV_IMPEDANCE_VALUE: 516 OHM
MDC_IDC_MSMT_LEADCHNL_RV_PACING_THRESHOLD_AMPLITUDE: 0.7 V
MDC_IDC_MSMT_LEADCHNL_RV_PACING_THRESHOLD_PULSEWIDTH: 0.4 MS
MDC_IDC_PG_IMPLANT_DTM: NORMAL
MDC_IDC_PG_MFG: NORMAL
MDC_IDC_PG_MODEL: NORMAL
MDC_IDC_PG_SERIAL: NORMAL
MDC_IDC_PG_TYPE: NORMAL
MDC_IDC_SESS_CLINIC_NAME: NORMAL
MDC_IDC_SESS_DTM: NORMAL
MDC_IDC_SESS_TYPE: NORMAL
MDC_IDC_SET_BRADY_AT_MODE_SWITCH_MODE: NORMAL
MDC_IDC_SET_BRADY_AT_MODE_SWITCH_RATE: 170 {BEATS}/MIN
MDC_IDC_SET_BRADY_LOWRATE: 60 {BEATS}/MIN
MDC_IDC_SET_BRADY_MAX_SENSOR_RATE: 130 {BEATS}/MIN
MDC_IDC_SET_BRADY_MAX_TRACKING_RATE: 130 {BEATS}/MIN
MDC_IDC_SET_BRADY_MODE: NORMAL
MDC_IDC_SET_BRADY_PAV_DELAY_HIGH: 180 MS
MDC_IDC_SET_BRADY_PAV_DELAY_LOW: 260 MS
MDC_IDC_SET_BRADY_SAV_DELAY_HIGH: 160 MS
MDC_IDC_SET_BRADY_SAV_DELAY_LOW: 230 MS
MDC_IDC_SET_LEADCHNL_RA_PACING_AMPLITUDE: 1.8 V
MDC_IDC_SET_LEADCHNL_RA_PACING_POLARITY: NORMAL
MDC_IDC_SET_LEADCHNL_RA_PACING_PULSEWIDTH: 0.6 MS
MDC_IDC_SET_LEADCHNL_RA_SENSING_ADAPTATION_MODE: NORMAL
MDC_IDC_SET_LEADCHNL_RA_SENSING_POLARITY: NORMAL
MDC_IDC_SET_LEADCHNL_RA_SENSING_SENSITIVITY: 0.5 MV
MDC_IDC_SET_LEADCHNL_RV_PACING_AMPLITUDE: 1.5 V
MDC_IDC_SET_LEADCHNL_RV_PACING_POLARITY: NORMAL
MDC_IDC_SET_LEADCHNL_RV_PACING_PULSEWIDTH: 0.4 MS
MDC_IDC_SET_LEADCHNL_RV_SENSING_ADAPTATION_MODE: NORMAL
MDC_IDC_SET_LEADCHNL_RV_SENSING_POLARITY: NORMAL
MDC_IDC_SET_LEADCHNL_RV_SENSING_SENSITIVITY: 0.6 MV
MDC_IDC_SET_ZONE_DETECTION_INTERVAL: 300 MS
MDC_IDC_SET_ZONE_TYPE: NORMAL
MDC_IDC_SET_ZONE_VENDOR_TYPE: NORMAL
MDC_IDC_STAT_BRADY_DTM_END: NORMAL
MDC_IDC_STAT_BRADY_DTM_START: NORMAL
MDC_IDC_STAT_BRADY_RA_PERCENT_PACED: 64 %
MDC_IDC_STAT_BRADY_RV_PERCENT_PACED: 0 %
MDC_IDC_STAT_EPISODE_RECENT_COUNT: 0
MDC_IDC_STAT_EPISODE_RECENT_COUNT: 2
MDC_IDC_STAT_EPISODE_RECENT_COUNT_DTM_END: NORMAL
MDC_IDC_STAT_EPISODE_RECENT_COUNT_DTM_START: NORMAL
MDC_IDC_STAT_EPISODE_TYPE: NORMAL
MDC_IDC_STAT_EPISODE_VENDOR_TYPE: NORMAL
MDC_IDC_STAT_TACHYTHERAPY_ATP_DELIVERED_RECENT: 0
MDC_IDC_STAT_TACHYTHERAPY_ATP_DELIVERED_TOTAL: 0
MDC_IDC_STAT_TACHYTHERAPY_RECENT_DTM_END: NORMAL
MDC_IDC_STAT_TACHYTHERAPY_RECENT_DTM_START: NORMAL
MDC_IDC_STAT_TACHYTHERAPY_SHOCKS_ABORTED_RECENT: 0
MDC_IDC_STAT_TACHYTHERAPY_SHOCKS_ABORTED_TOTAL: 0
MDC_IDC_STAT_TACHYTHERAPY_SHOCKS_DELIVERED_RECENT: 0
MDC_IDC_STAT_TACHYTHERAPY_SHOCKS_DELIVERED_TOTAL: 1
MDC_IDC_STAT_TACHYTHERAPY_TOTAL_DTM_END: NORMAL

## 2022-11-20 PROCEDURE — 93296 REM INTERROG EVL PM/IDS: CPT | Performed by: INTERNAL MEDICINE

## 2022-11-20 PROCEDURE — 93295 DEV INTERROG REMOTE 1/2/MLT: CPT | Performed by: INTERNAL MEDICINE

## 2022-11-30 ENCOUNTER — HOSPITAL ENCOUNTER (EMERGENCY)
Facility: CLINIC | Age: 87
Discharge: HOME | End: 2022-11-30
Attending: EMERGENCY MEDICINE | Admitting: EMERGENCY MEDICINE
Payer: MEDICARE

## 2022-11-30 ENCOUNTER — APPOINTMENT (OUTPATIENT)
Dept: RADIOLOGY | Facility: CLINIC | Age: 87
End: 2022-11-30
Attending: EMERGENCY MEDICINE
Payer: MEDICARE

## 2022-11-30 VITALS
TEMPERATURE: 98.4 F | SYSTOLIC BLOOD PRESSURE: 110 MMHG | RESPIRATION RATE: 20 BRPM | WEIGHT: 160 LBS | HEART RATE: 83 BPM | DIASTOLIC BLOOD PRESSURE: 62 MMHG | BODY MASS INDEX: 23.7 KG/M2 | HEIGHT: 69 IN | OXYGEN SATURATION: 96 %

## 2022-11-30 DIAGNOSIS — J18.9 PNEUMONIA DUE TO INFECTIOUS ORGANISM, UNSPECIFIED LATERALITY, UNSPECIFIED PART OF LUNG: ICD-10-CM

## 2022-11-30 DIAGNOSIS — R17 ELEVATED BILIRUBIN: ICD-10-CM

## 2022-11-30 DIAGNOSIS — M62.81 GENERALIZED MUSCLE WEAKNESS: ICD-10-CM

## 2022-11-30 DIAGNOSIS — H10.33 ACUTE BACTERIAL CONJUNCTIVITIS OF BOTH EYES: ICD-10-CM

## 2022-11-30 LAB
ALBUMIN SERPL-MCNC: 3.2 G/DL (ref 3.5–5)
ALP SERPL-CCNC: 77 U/L (ref 45–120)
ALT SERPL W P-5'-P-CCNC: <9 U/L (ref 0–45)
ANION GAP SERPL CALCULATED.3IONS-SCNC: 11 MMOL/L (ref 5–18)
AST SERPL W P-5'-P-CCNC: 14 U/L (ref 0–40)
ATRIAL RATE - MUSE: 66 BPM
BASOPHILS # BLD AUTO: 0 10E3/UL (ref 0–0.2)
BASOPHILS NFR BLD AUTO: 0 %
BILIRUB DIRECT SERPL-MCNC: 0.3 MG/DL
BILIRUB SERPL-MCNC: 3.1 MG/DL (ref 0–1)
BUN SERPL-MCNC: 29 MG/DL (ref 8–28)
CALCIUM SERPL-MCNC: 9 MG/DL (ref 8.5–10.5)
CHLORIDE BLD-SCNC: 105 MMOL/L (ref 98–107)
CO2 SERPL-SCNC: 23 MMOL/L (ref 22–31)
CREAT SERPL-MCNC: 1.52 MG/DL (ref 0.7–1.3)
DIASTOLIC BLOOD PRESSURE - MUSE: NORMAL MMHG
EOSINOPHIL # BLD AUTO: 0 10E3/UL (ref 0–0.7)
EOSINOPHIL NFR BLD AUTO: 0 %
ERYTHROCYTE [DISTWIDTH] IN BLOOD BY AUTOMATED COUNT: 13.2 % (ref 10–15)
FLUAV RNA SPEC QL NAA+PROBE: NEGATIVE
FLUBV RNA RESP QL NAA+PROBE: NEGATIVE
GFR SERPL CREATININE-BSD FRML MDRD: 44 ML/MIN/1.73M2
GLUCOSE BLD-MCNC: 92 MG/DL (ref 70–125)
HCT VFR BLD AUTO: 36.4 % (ref 40–53)
HGB BLD-MCNC: 11.8 G/DL (ref 13.3–17.7)
IMM GRANULOCYTES # BLD: 0.1 10E3/UL
IMM GRANULOCYTES NFR BLD: 1 %
INTERPRETATION ECG - MUSE: NORMAL
LYMPHOCYTES # BLD AUTO: 0.7 10E3/UL (ref 0.8–5.3)
LYMPHOCYTES NFR BLD AUTO: 7 %
MAGNESIUM SERPL-MCNC: 1.7 MG/DL (ref 1.8–2.6)
MCH RBC QN AUTO: 29.5 PG (ref 26.5–33)
MCHC RBC AUTO-ENTMCNC: 32.4 G/DL (ref 31.5–36.5)
MCV RBC AUTO: 91 FL (ref 78–100)
MONOCYTES # BLD AUTO: 0.6 10E3/UL (ref 0–1.3)
MONOCYTES NFR BLD AUTO: 6 %
NEUTROPHILS # BLD AUTO: 9.3 10E3/UL (ref 1.6–8.3)
NEUTROPHILS NFR BLD AUTO: 86 %
NRBC # BLD AUTO: 0 10E3/UL
NRBC BLD AUTO-RTO: 0 /100
P AXIS - MUSE: 64 DEGREES
PLATELET # BLD AUTO: 237 10E3/UL (ref 150–450)
POTASSIUM BLD-SCNC: 4.6 MMOL/L (ref 3.5–5)
PR INTERVAL - MUSE: 142 MS
PROT SERPL-MCNC: 6.8 G/DL (ref 6–8)
QRS DURATION - MUSE: 130 MS
QT - MUSE: 428 MS
QTC - MUSE: 448 MS
R AXIS - MUSE: -36 DEGREES
RBC # BLD AUTO: 4 10E6/UL (ref 4.4–5.9)
RSV RNA SPEC NAA+PROBE: NEGATIVE
SARS-COV-2 RNA RESP QL NAA+PROBE: NEGATIVE
SODIUM SERPL-SCNC: 139 MMOL/L (ref 136–145)
SYSTOLIC BLOOD PRESSURE - MUSE: NORMAL MMHG
T AXIS - MUSE: 77 DEGREES
VENTRICULAR RATE- MUSE: 66 BPM
WBC # BLD AUTO: 10.7 10E3/UL (ref 4–11)

## 2022-11-30 PROCEDURE — 36415 COLL VENOUS BLD VENIPUNCTURE: CPT | Performed by: EMERGENCY MEDICINE

## 2022-11-30 PROCEDURE — 83735 ASSAY OF MAGNESIUM: CPT | Performed by: EMERGENCY MEDICINE

## 2022-11-30 PROCEDURE — 87637 SARSCOV2&INF A&B&RSV AMP PRB: CPT | Performed by: EMERGENCY MEDICINE

## 2022-11-30 PROCEDURE — 93005 ELECTROCARDIOGRAM TRACING: CPT | Performed by: EMERGENCY MEDICINE

## 2022-11-30 PROCEDURE — C9803 HOPD COVID-19 SPEC COLLECT: HCPCS

## 2022-11-30 PROCEDURE — 71046 X-RAY EXAM CHEST 2 VIEWS: CPT

## 2022-11-30 PROCEDURE — 82248 BILIRUBIN DIRECT: CPT | Performed by: EMERGENCY MEDICINE

## 2022-11-30 PROCEDURE — 99285 EMERGENCY DEPT VISIT HI MDM: CPT | Mod: CS,25

## 2022-11-30 PROCEDURE — 82310 ASSAY OF CALCIUM: CPT | Performed by: EMERGENCY MEDICINE

## 2022-11-30 PROCEDURE — 85025 COMPLETE CBC W/AUTO DIFF WBC: CPT | Performed by: EMERGENCY MEDICINE

## 2022-11-30 RX ORDER — ERYTHROMYCIN 5 MG/G
0.5 OINTMENT OPHTHALMIC 4 TIMES DAILY
Qty: 3.5 G | Refills: 0 | Status: SHIPPED | OUTPATIENT
Start: 2022-11-30 | End: 2022-12-07

## 2022-11-30 RX ORDER — BENZONATATE 100 MG/1
100 CAPSULE ORAL 2 TIMES DAILY PRN
Qty: 10 CAPSULE | Refills: 0 | Status: SHIPPED | OUTPATIENT
Start: 2022-11-30 | End: 2024-09-12

## 2022-11-30 RX ORDER — AZITHROMYCIN 250 MG/1
TABLET, FILM COATED ORAL
Qty: 6 TABLET | Refills: 0 | Status: SHIPPED | OUTPATIENT
Start: 2022-11-30 | End: 2022-12-05

## 2022-11-30 ASSESSMENT — ENCOUNTER SYMPTOMS
FATIGUE: 0
JOINT SWELLING: 1
CHILLS: 0
EYE DISCHARGE: 1
WEAKNESS: 1
APPETITE CHANGE: 1
HEADACHES: 0
COUGH: 1
DIARRHEA: 0
VOMITING: 0
DYSURIA: 0
ABDOMINAL PAIN: 0
SHORTNESS OF BREATH: 0
FEVER: 0

## 2022-11-30 NOTE — ED TRIAGE NOTES
"Pt presents to the ED with c/o cough, diarrhea, and weakness over the past couple days. Pt fell today - trying to put pants on. Denies hitting head, LOC, or thinners. Pt states \" I feel better now that im here\".       "

## 2022-12-01 NOTE — ED PROVIDER NOTES
EMERGENCY DEPARTMENT ENCOUNTER      NAME: Anthony Tiwari  AGE: 87 year old male  YOB: 1935  MRN: [id number]  EVALUATION DATE & TIME: 11/30/2022  7:20 PM    PCP: García Peña    ED PROVIDER: Petrona Jimenez DO      Chief Complaint   Patient presents with     Fall     Cough         FINAL IMPRESSION:  1. Pneumonia due to infectious organism, unspecified laterality, unspecified part of lung    2. Generalized muscle weakness    3. Elevated bilirubin    4. Acute bacterial conjunctivitis of both eyes          ED COURSE & MEDICAL DECISION MAKING:    Pertinent Labs & Imaging studies reviewed. (See chart for details)    7:00 PM I met the patient and performed my initial interview and exam. Patient seen in [facility] due to critical capacity leaving no ED rooms available.      87 year old male presents to the Emergency Department for evaluation of cough and generalized weakness.  He notes symptoms started around 4 days ago.  Up coughing most of the night.  Productive at times.  Noted purulent drainage from both of his eyes today.  No known sick contacts.  Low-grade fever.  Chest wall pain with cough.  He is ambulatory here in the ED.  He is not tachypneic or hypoxic.  Vital signs are unremarkable.  No abdominal pain.  He reports this morning he was putting on his pants and fell backwards onto his bottom.  He did not lose consciousness or hit his head.  Wife witnessed this.  His exam is benign.  He has some coarse lung sounds.  His COVID and influenza are negative.  Chest x-ray concerning for pneumonia.  His bilirubin is elevated at 3.1, however his LFTs otherwise are unremarkable and patient without any pain here today.  No indication for CT imaging of his abdomen or ultrasound of his right upper quadrant today.  Discussed with patient and wife.  They feel comfortable with discharge.  He is ambulatory independently here.  Will discharge with antibiotics for community-acquired pneumonia.  Discussed symptomatic  care.  Encourage close follow-up for repeat evaluation and recheck of his labs.  Encourage return if any acute worsening symptoms.      At the conclusion of the encounter I discussed the results of all of the tests and the disposition. The questions were answered. The patient or family acknowledged understanding and was agreeable with the care plan.     Medical Decision Making    History:    Supplemental history from: Family Member/Significant Other    External Record(s) reviewed: Documented in HPI, if applicable.    Work Up:    Chart documentation includes differential considered and any EKGs or imaging interpreted by provider.    In additional to work up documented, I considered the following work up: See chart documentation, if applicable.    External consultation:    Discussion of management with another provider: See chart documentation, if applicable    Complicating factors:    Care impacted by chronic illness: None    Care affected by social determinants of health: N/A    Disposition considerations: Discharge. I prescribed additional prescription strength medication(s) as charted. I considered admission, but discharged patient after reassuring labs and/or imaging.    MEDICATIONS GIVEN IN THE EMERGENCY:  Medications   0.9% sodium chloride BOLUS (has no administration in time range)       NEW PRESCRIPTIONS STARTED AT TODAY'S ER VISIT  Discharge Medication List as of 11/30/2022  7:21 PM      START taking these medications    Details   amoxicillin-clavulanate (AUGMENTIN) 875-125 MG tablet Take 1 tablet by mouth 2 times daily for 7 days, Disp-14 tablet, R-0, E-Prescribe      azithromycin (ZITHROMAX) 250 MG tablet Take 2 tablets (500 mg) by mouth daily for 1 day, THEN 1 tablet (250 mg) daily for 4 days., Disp-6 tablet, R-0, E-Prescribe      benzonatate (TESSALON) 100 MG capsule Take 1 capsule (100 mg) by mouth 2 times daily as needed for cough, Disp-10 capsule, R-0, E-Prescribe      erythromycin (ROMYCIN) 5 MG/GM  "ophthalmic ointment Place 0.5 inches into both eyes 4 times daily for 7 daysDisp-3.5 g, [id number]                =================================================================    HPI    Patient information was obtained from: Patient     Use of : N/A       Anthony Tiwari is a 87 year old male with a pertinent history of Parkinson's Disease, CKD stage 3, DM type II, and HTN who presents to this ED via walk-in for evaluation of a cough and a fall    The patient reports that for the past 3 days they have had a cough, chest pain with coughing, feeling weak, and a decreased appetite. The patient notes they \"feel a lot better today\". The patient denies any sick contacts recently. The patient also reports they were putting on pants this morning and \"fell backward\". The patient denies hitting their head or loss of consciousness. The patient also notes some white discharge coming from their eyes that their wife noticed today. Their wife also notes the patient has some new ankle swelling and had one episode of bowel incontinence this morning. The patient denies fever or diarrhea.     REVIEW OF SYSTEMS   Review of Systems   Constitutional: Positive for appetite change (decreased). Negative for chills, fatigue and fever.   Eyes: Positive for discharge (white).   Respiratory: Positive for cough. Negative for shortness of breath.    Cardiovascular: Positive for chest pain (with coughing).   Gastrointestinal: Negative for abdominal pain, diarrhea and vomiting.        One episode of incontinence   Genitourinary: Negative for dysuria.   Musculoskeletal: Positive for joint swelling (bilateral ankles).   Skin: Negative.    Neurological: Positive for weakness (generalized). Negative for syncope and headaches.   All other systems reviewed and are negative.      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 Cody at [facility], getting chemo again in 2017     Nonocclusive coronary atherosclerosis of native coronary artery     nonobstuctive by cor angio       SIRIA on CPAP 2013    he sees someone at [facility]     Other primary cardiomyopathies 2013    EF was as low as 23%     Statin intolerance 02/21/2019    history of elevated CPK on both simvastatin but less so on atorvastatin.     Type 2 diabetes mellitus (H)        PAST SURGICAL HISTORY:  Past Surgical History:   Procedure Laterality Date     CARDIAC DEFIBRILLATOR PLACEMENT  07/24/2013    Dr. Rodriguez     CATARACT EXTRACTION W/ INTRAOCULAR LENS  IMPLANT, BILATERAL  05/2018     CYSTOSCOPY W/ LITHOLAPAXY / EHL N/A 03/19/2019    Procedure: CYSTOLITHOLAPAXY;  Surgeon: Yunior Moore MD;  Location: [facility] OR;  Service: Urology     HERNIA REPAIR Left      CA CYSTO/URETERO W/LITHOTRIPSY &INDWELL STENT INSRT Right 03/19/2019    Procedure: CYSTOSCOPY RIGHT URETEROSCOPY LASER LITHOTRIPSY;  Surgeon: Yunior Moore MD;  Location: [facility] OR;  Service: Urology     TOTAL HIP ARTHROPLASTY Right 2012     URETEROSCOPY             CURRENT MEDICATIONS:    amoxicillin-clavulanate (AUGMENTIN) 875-125 MG tablet  azithromycin (ZITHROMAX) 250 MG tablet  benzonatate (TESSALON) 100 MG capsule  erythromycin (ROMYCIN) 5 MG/GM ophthalmic ointment  aspirin 81 MG EC tablet  atorvastatin (LIPITOR) 20 MG tablet  carbidopa-levodopa (SINEMET)  MG tablet  fenofibrate (TRICOR) 145 MG tablet  insulin NPH-insulin regular (NOVOLIN 70-30) 100 unit/mL (70-30) injection  lisinopril (PRINIVIL,ZESTRIL) 2.5 MG tablet  metFORMIN (GLUCOPHAGE) 500 MG tablet  metoprolol succinate ER  "(TOPROL-XL) 100 MG 24 hr tablet  omeprazole (PRILOSEC OTC) 20 MG tablet         ALLERGIES:  Allergies   Allergen Reactions     Simvastatin Other (See Comments)     Elevated CPK       FAMILY HISTORY:  Family History   Problem Relation Age of Onset     Rheumatic Heart Disease Brother         half brother     Dementia Brother      Cerebrovascular Disease Father      Rheumatic Heart Disease Sister      Sudden Death Sister 67.00        no autopsy     CABG Sister 81.00     No Known Problems Daughter      No Known Problems Daughter      No Known Problems Daughter      No Known Problems Granddaughter      No Known Problems Grandson      Urolithiasis No family hx of      Clotting Disorder No family hx of      Gout No family hx of        SOCIAL HISTORY:   Social History     Socioeconomic History     Marital status:      Number of children: 3   Tobacco Use     Smoking status: Never     Smokeless tobacco: Never   Substance and Sexual Activity     Alcohol use: No     Drug use: No   Social History Narrative    He used to walk for an hour most days and still takes care of his lawn and snow removal, though his grandsons are helping more with the lawn in 2018. 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 He is walking less in 2019. His wife,  Rebeka, is 6 years his tu and is providing daytime and overnight  for their daughter Loree's fraternal twins, Estrella and Jovanny, since 2019. Anthony spends 5 hours a day with Loree and the twins.       VITALS:  /62   Pulse 83   Temp 98.4  F (36.9  C) (Temporal)   Resp 20   Ht 1.753 m (5' 9\")   Wt 72.6 kg (160 lb)   SpO2 96%   BMI 23.63 kg/m      PHYSICAL EXAM    Physical Exam  Constitutional:       General: He is not in acute distress.  HENT:      Head: Normocephalic and atraumatic.      Mouth/Throat:      Pharynx: Oropharynx is clear.   Eyes:      General: No scleral icterus.        Right eye: Discharge present.         Left eye: " Discharge present.     Conjunctiva/sclera:      Right eye: Right conjunctiva is injected.      Left eye: Left conjunctiva is injected.      Pupils: Pupils are equal, round, and reactive to light.   Cardiovascular:      Rate and Rhythm: Normal rate and regular rhythm.      Pulses: Normal pulses.      Heart sounds: Normal heart sounds.   Pulmonary:      Effort: Pulmonary effort is normal.      Comments: Lungs sound coarse  Abdominal:      General: Abdomen is flat. Bowel sounds are normal.      Palpations: Abdomen is soft.      Tenderness: There is no abdominal tenderness.   Musculoskeletal:         General: Normal range of motion.   Skin:     General: Skin is warm and dry.      Capillary Refill: Capillary refill takes less than 2 seconds.   Neurological:      General: No focal deficit present.      Mental Status: He is alert and oriented to person, place, and time.          LAB:  All pertinent labs reviewed and interpreted.  Labs Ordered and Resulted from Time of ED Arrival to Time of ED Departure   BASIC METABOLIC PANEL - Abnormal       Result Value    Sodium 139      Potassium 4.6      Chloride 105      Carbon Dioxide (CO2) 23      Anion Gap 11      Urea Nitrogen 29 (*)     Creatinine 1.52 (*)     Calcium 9.0      Glucose 92      GFR Estimate 44 (*)    MAGNESIUM - Abnormal    Magnesium 1.7 (*)    HEPATIC FUNCTION PANEL - Abnormal    Bilirubin Total 3.1 (*)     Bilirubin Direct 0.3      Protein Total 6.8      Albumin 3.2 (*)     Alkaline Phosphatase 77      AST 14      ALT <9     CBC WITH PLATELETS AND DIFFERENTIAL - Abnormal    WBC Count 10.7      RBC Count 4.00 (*)     Hemoglobin 11.8 (*)     Hematocrit 36.4 (*)     MCV 91      MCH 29.5      MCHC 32.4      RDW 13.2      Platelet Count 237      % Neutrophils 86      % Lymphocytes 7      % Monocytes 6      % Eosinophils 0      % Basophils 0      % Immature Granulocytes 1      NRBCs per 100 WBC 0      Absolute Neutrophils 9.3 (*)     Absolute Lymphocytes 0.7 (*)      Absolute Monocytes 0.6      Absolute Eosinophils 0.0      Absolute Basophils 0.0      Absolute Immature Granulocytes 0.1      Absolute NRBCs 0.0     INFLUENZA A/B & SARS-COV2 PCR MULTIPLEX - Normal    Influenza A PCR Negative      Influenza B PCR Negative      RSV PCR Negative      SARS CoV2 PCR Negative         RADIOLOGY:  Reviewed all pertinent imaging. Please see official radiology report.  XR Chest 2 Views   Final Result   IMPRESSION: Stable cardiomediastinal silhouette with pacemaker/AICD leads overlying the right atrium and right ventricle. New multifocal airspace disease throughout both lungs, most pronounced in the lower lobes, suspicious for pneumonia. Recommend    radiographic follow-up to resolution. Likely trace bilateral pleural effusions. No pneumothorax. No acute bony abnormality.          EKG:    Performed at: 11/30/22 at 5:43 PM    Impression: left axis deviation. Nonspecific intraventricular conduction block. Abnormal EKG    Rate: 66  Rhythm: Sinus  Axis: -36  FL Interval: 142  QRS Interval: 130  QTc Interval: 448  ST Changes: None  Comparison: When compared to EKG from 4/10/21- no significant change was found     I have independently reviewed and interpreted the EKG(s) documented above.      I, Luiza García, am serving as a scribe to document services personally performed by Dr. Petrona Jimenez based on my observation and the provider's statements to me. I, Petrona Jimenez, DO attest that Luiza García is acting in a scribe capacity, has observed my performance of the services and has documented them in accordance with my direction.    Petrona Jimenez DO  Emergency Medicine  [facility] EMERGENCY ROOM  [address] [facility] [id number] [phone]  Dept: [phone]     Petrona Jimenez DO  11/30/22 2327

## 2022-12-01 NOTE — DISCHARGE INSTRUCTIONS
Your x-ray shows possible pneumonia  No signs of COVID infection or influenza  Your lab tolerance called the bilirubin is elevated, however the rest of your liver function tests are normal  I would follow-up closely with your primary provider for recheck  We will start you on antibiotics in regards to the pneumonia.  Take these until completed  Tessalon Perles for cough, I would continue cough drops  Erythromycin ointment to eyes for conjunctivitis  Return if any acute worsening symptoms

## 2023-01-20 ENCOUNTER — TRANSFERRED RECORDS (OUTPATIENT)
Dept: HEALTH INFORMATION MANAGEMENT | Facility: CLINIC | Age: 88
End: 2023-01-20

## 2023-01-26 ENCOUNTER — LAB REQUISITION (OUTPATIENT)
Dept: LAB | Facility: CLINIC | Age: 88
End: 2023-01-26
Payer: MEDICARE

## 2023-01-26 ENCOUNTER — TRANSFERRED RECORDS (OUTPATIENT)
Dept: HEALTH INFORMATION MANAGEMENT | Facility: CLINIC | Age: 88
End: 2023-01-26

## 2023-01-26 DIAGNOSIS — E78.1 PURE HYPERGLYCERIDEMIA: ICD-10-CM

## 2023-01-26 DIAGNOSIS — Z13.29 ENCOUNTER FOR SCREENING FOR OTHER SUSPECTED ENDOCRINE DISORDER: ICD-10-CM

## 2023-01-26 DIAGNOSIS — E11.21 TYPE 2 DIABETES MELLITUS WITH DIABETIC NEPHROPATHY (H): ICD-10-CM

## 2023-01-26 LAB
ALBUMIN SERPL BCG-MCNC: 4.1 G/DL (ref 3.5–5.2)
ALP SERPL-CCNC: 80 U/L (ref 40–129)
ALT SERPL W P-5'-P-CCNC: 6 U/L (ref 10–50)
ANION GAP SERPL CALCULATED.3IONS-SCNC: 8 MMOL/L (ref 7–15)
AST SERPL W P-5'-P-CCNC: 23 U/L (ref 10–50)
BILIRUB SERPL-MCNC: 1.3 MG/DL
BUN SERPL-MCNC: 19.5 MG/DL (ref 8–23)
CALCIUM SERPL-MCNC: 9.3 MG/DL (ref 8.8–10.2)
CHLORIDE SERPL-SCNC: 106 MMOL/L (ref 98–107)
CHOLEST SERPL-MCNC: 95 MG/DL
CREAT SERPL-MCNC: 1.11 MG/DL (ref 0.67–1.17)
DEPRECATED HCO3 PLAS-SCNC: 27 MMOL/L (ref 22–29)
GFR SERPL CREATININE-BSD FRML MDRD: 64 ML/MIN/1.73M2
GLUCOSE SERPL-MCNC: 98 MG/DL (ref 70–99)
HDLC SERPL-MCNC: 33 MG/DL
LDLC SERPL CALC-MCNC: 36 MG/DL
NONHDLC SERPL-MCNC: 62 MG/DL
POTASSIUM SERPL-SCNC: 4.7 MMOL/L (ref 3.4–5.3)
PROT SERPL-MCNC: 6.5 G/DL (ref 6.4–8.3)
PTH-INTACT SERPL-MCNC: 31 PG/ML (ref 15–65)
SODIUM SERPL-SCNC: 141 MMOL/L (ref 136–145)
TRIGL SERPL-MCNC: 128 MG/DL
TSH SERPL DL<=0.005 MIU/L-ACNC: 0.81 UIU/ML (ref 0.3–4.2)

## 2023-01-26 PROCEDURE — 80061 LIPID PANEL: CPT | Mod: ORL | Performed by: FAMILY MEDICINE

## 2023-01-26 PROCEDURE — 84443 ASSAY THYROID STIM HORMONE: CPT | Mod: ORL | Performed by: FAMILY MEDICINE

## 2023-01-26 PROCEDURE — 83970 ASSAY OF PARATHORMONE: CPT | Mod: ORL | Performed by: FAMILY MEDICINE

## 2023-01-26 PROCEDURE — 80053 COMPREHEN METABOLIC PANEL: CPT | Mod: ORL | Performed by: FAMILY MEDICINE

## 2023-03-22 ENCOUNTER — OFFICE VISIT (OUTPATIENT)
Dept: CARDIOLOGY | Facility: CLINIC | Age: 88
End: 2023-03-22
Attending: INTERNAL MEDICINE
Payer: MEDICARE

## 2023-03-22 ENCOUNTER — TELEPHONE (OUTPATIENT)
Dept: CARDIOLOGY | Facility: CLINIC | Age: 88
End: 2023-03-22

## 2023-03-22 VITALS
DIASTOLIC BLOOD PRESSURE: 50 MMHG | SYSTOLIC BLOOD PRESSURE: 108 MMHG | WEIGHT: 182.2 LBS | BODY MASS INDEX: 26.08 KG/M2 | RESPIRATION RATE: 16 BRPM | HEART RATE: 60 BPM | HEIGHT: 70 IN

## 2023-03-22 DIAGNOSIS — I47.29 NSVT (NONSUSTAINED VENTRICULAR TACHYCARDIA) (H): Primary | ICD-10-CM

## 2023-03-22 DIAGNOSIS — Z95.810 DUAL ICD (IMPLANTABLE CARDIOVERTER-DEFIBRILLATOR) IN PLACE: ICD-10-CM

## 2023-03-22 DIAGNOSIS — N18.32 STAGE 3B CHRONIC KIDNEY DISEASE (H): ICD-10-CM

## 2023-03-22 DIAGNOSIS — Z95.810 ICD (IMPLANTABLE CARDIOVERTER-DEFIBRILLATOR) IN PLACE: ICD-10-CM

## 2023-03-22 DIAGNOSIS — I42.8 NICM (NONISCHEMIC CARDIOMYOPATHY) (H): ICD-10-CM

## 2023-03-22 DIAGNOSIS — I10 ESSENTIAL HYPERTENSION: ICD-10-CM

## 2023-03-22 PROCEDURE — 93283 PRGRMG EVAL IMPLANTABLE DFB: CPT | Performed by: INTERNAL MEDICINE

## 2023-03-22 PROCEDURE — 99214 OFFICE O/P EST MOD 30 MIN: CPT | Performed by: INTERNAL MEDICINE

## 2023-03-22 RX ORDER — MAGNESIUM OXIDE 400 MG/1
400 TABLET ORAL DAILY
Qty: 90 TABLET | Refills: 3 | Status: SHIPPED | OUTPATIENT
Start: 2023-03-22 | End: 2024-03-01

## 2023-03-22 RX ORDER — METOPROLOL SUCCINATE 100 MG/1
100 TABLET, EXTENDED RELEASE ORAL AT BEDTIME
Qty: 90 TABLET | Refills: 3 | Status: SHIPPED | OUTPATIENT
Start: 2023-03-22 | End: 2024-02-14

## 2023-03-22 NOTE — TELEPHONE ENCOUNTER
Concern: Undetected ventricular arrhythmias upwards of at least 32 beats (termination of arrhythmia not seen) marked as atrial arrhythmias due to FFRW oversensing on atrial channel with an under-detected rate     Device: [employer], ICD Energen (D)  Mode: DDDR 60/130  VT/VF Settings: VF    200bpm   Detection: 1 second   Quick convert ATP   [id number]    ECHO:  Date-119/2022, EF-30-40%   Anitcoagulant/Antiarrhythmic:  None/ Metoprolol      Assessment: Patient was asymptomatic to episodes of NSVT    Reprogramming: Atrial sensitivity programmed from 0.5mV to 0.8mV. VT detection monitor zone programmed to 140bpm with rhythm ID turned with a detection duration of 5 seconds. Patient is aware that if further changes are warranted he will need to come into clinic once more and is ok with this plan.     Routed to: Dr. Latonia Allen to review changes to programming and further recommendations if any

## 2023-03-22 NOTE — LETTER
3/22/2023    García Peña MD  [facility] [address] [facility] [id number]    RE: Anthony Tiwari       Dear Colleague,     I had the pleasure of seeing Anthony Tiwari in the [facility].    [facility]   [address]  [place] [address]   [email or web address]   OFFICE: [phone]     CARDIOLOGY CLINIC NOTE     Thank you, García Schreiber, for asking the [facility] Heart Care team to see Mr. Anthony Tiwari to evaluate Follow Up (ICD, NICM)        Assessment/Recommendations   Assessment:    1. 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 Dual chamber ICD in situ - functioning normally  3. NSVT - incidentally noted on ICD interrogation. Duration of up to 38 beats. Magnesium was a little low when last checked a few months ago.  4. CKD stage 3b  5. SIRIA on CPAP  6. Hypertension - blood pressure reasonable today.    Plan:  1. Start magnesium oxide 400 mg daily  2. Continue other medications without changes.  3. Encouraged a daily walk  4. Follow up with me in a year or sooner if needed.         History of Present Illness   Mr. Anthony Tiwari is a 87 year old male who presents for follow-up.     Mr. Tiwari has a known non-ischemic cardiomyopathy dating back to at least 2008 and was diagnosed with parkinson's disease in the fall of 2021. His most recent LVEF was 30-40% in January, 2022. He has a dual chamber ICD for primary prevention of SCD.    Today, Mr. Tiwari reports feeling generally well. He denies any new cardiac symptoms. He does not get regular exercise but has shoveled his sidewalk several times this winter without reported anginal symptoms.      Other than noted above, Mr. Tiwari denies any chest pain/pressure/tightness, shortness of breath at rest or with exertion, light headedness/dizziness, pre-syncope, syncope, lower extremity swelling, palpitations, paroxysmal nocturnal dyspnea (PND), or  orthopnea.     Cardiac Problems and Cardiac Diagnostics     Most Recent Cardiac testing:  ECG dated 11/30/22 (personaly reviewed and interpreted): sinus rhythm with left axis deviation, Nonspecific IVCD    ECHO (report reviewed):   TTE 1/19/22  The left ventricle is mildly dilated.  There is normal left ventricular wall thickness. The visual ejection fraction  is 30-40%.  Diastolic Doppler findings (E/E' ratio and/or other parameters) suggest left  ventricular filling pressures are increased.  There is severe global hypokinesia of the left ventricle.  Normal right ventricle size and systolic function.  There is a catheter/pacemaker lead seen in the right ventricle.  The left atrium is mildly dilated.  No obvious valvular disease.     When compared to previous study on 12-, there is no significant change.  Reviewed both images and compared images to imges.    Lexiscan NM stress test 11/8/16  CONCLUSION:   1. Lexiscan stress nuclear study is negative for inducible myocardial   Ischemia.  2. Moderately reduced left ventricular systolic function with an ejection fraction of 41%.      COMMENTS:   Comparison with the images of the exam of 5/9/13 demonstrates improvement in left ventricular systolic function on the current study.       Medications  Allergies   Current Outpatient Medications   Medication Sig Dispense Refill     aspirin 81 MG EC tablet [ASPIRIN 81 MG EC TABLET] Take 81 mg by mouth bedtime.       atorvastatin (LIPITOR) 20 MG tablet [ATORVASTATIN (LIPITOR) 20 MG TABLET] Take 20 mg by mouth at bedtime.       benzonatate (TESSALON) 100 MG capsule Take 1 capsule (100 mg) by mouth 2 times daily as needed for cough 10 capsule 0     carbidopa-levodopa (SINEMET)  MG tablet Take 100 tablets by mouth 3 times daily        CONTOUR NEXT TEST test strip USE TO TEST BLOOD SUGAR DAILY       fenofibrate (TRICOR) 145 MG tablet [FENOFIBRATE (TRICOR) 145 MG TABLET] Take 145 mg by mouth daily.       insulin  "NPH-insulin regular (NOVOLIN 70-30) 100 unit/mL (70-30) injection [INSULIN NPH-INSULIN REGULAR (NOVOLIN 70-30) 100 UNIT/ML (70-30) INJECTION] Inject 12 Units under the skin 2 (two) times a day before meals.              lisinopril (PRINIVIL,ZESTRIL) 2.5 MG tablet [LISINOPRIL (PRINIVIL,ZESTRIL) 2.5 MG TABLET] Take 2.5 mg by mouth bedtime.        magnesium oxide (MAG-OX) 400 MG tablet Take 1 tablet (400 mg) by mouth daily 90 tablet 3     metFORMIN (GLUCOPHAGE) 500 MG tablet Take 1,000 mg by mouth 2 times daily (with meals) Take 2 pills in the A.M. and 2 pills at P.m.  2     metoprolol succinate ER (TOPROL XL) 100 MG 24 hr tablet Take 1 tablet (100 mg) by mouth At Bedtime 90 tablet 3     omeprazole (PRILOSEC OTC) 20 MG tablet [OMEPRAZOLE (PRILOSEC OTC) 20 MG TABLET] Take 20 mg by mouth bedtime.         Allergies   Allergen Reactions     Simvastatin Other (See Comments)     Elevated CPK        Physical Examination Review of Systems   Vitals: /50 (BP Location: Right arm, Patient Position: Sitting, Cuff Size: Adult Large)   Pulse 60   Resp 16   Ht 1.778 m (5' 10\")   Wt 82.6 kg (182 lb 3.2 oz)   BMI 26.14 kg/m    BMI= Body mass index is 26.14 kg/m .  Wt Readings from Last 3 Encounters:   03/22/23 82.6 kg (182 lb 3.2 oz)   11/30/22 72.6 kg (160 lb)   03/14/22 85.7 kg (189 lb)       General: pleasant male. No acute distress.  HENT: external ears normal. Nares patent. Mucous membranes moist.  Eyes: perrla, extraocular muscles intact. No scleral icterus.   Neck: No JVD  Lungs: clear to auscultation  COR: regular rate and rhythm, No murmurs, rubs, or gallops  Abd: nondistended  Extrem: No edema        Please refer above for cardiac ROS details.       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 Cody at [facility], getting chemo again in 2017     Nonocclusive coronary atherosclerosis of native coronary artery     nonobstuctive by cor angio       SIRIA on CPAP 2013    he sees someone at [facility]     Other primary cardiomyopathies 2013    EF was as low as 23%     Statin intolerance 02/21/2019    history of elevated CPK on both simvastatin but less so on atorvastatin.     Type 2 diabetes mellitus (H)         Past Surgical History:   Past Surgical History:   Procedure Laterality Date     CARDIAC DEFIBRILLATOR PLACEMENT  07/24/2013    Dr. Rodriguez     CATARACT EXTRACTION W/ INTRAOCULAR LENS  IMPLANT, BILATERAL  05/2018     CYSTOSCOPY W/ LITHOLAPAXY / EHL N/A 03/19/2019    Procedure: CYSTOLITHOLAPAXY;  Surgeon: Yunior Moore MD;  Location: [facility];  Service: Urology     HERNIA REPAIR Left      AK CYSTO/URETERO W/LITHOTRIPSY &INDWELL STENT INSRT Right 03/19/2019    Procedure: CYSTOSCOPY RIGHT URETEROSCOPY LASER LITHOTRIPSY;  Surgeon: Yunior Moore MD;  Location: [facility];  Service: Urology     TOTAL HIP ARTHROPLASTY Right 2012     URETEROSCOPY          Family History:   Family History   Problem Relation Age of Onset     Rheumatic Heart Disease Brother         half brother     Dementia Brother      Cerebrovascular Disease Father      Rheumatic Heart Disease Sister      Sudden Death Sister 67.00        no autopsy     CABG Sister 81.00     No Known Problems Daughter      No Known Problems Daughter      No Known Problems Daughter      No Known Problems Granddaughter      No Known Problems Grandson      Urolithiasis No family hx of      Clotting Disorder No family hx of      Gout No family hx of         Social History:   Social History     Socioeconomic History     Marital status:       Spouse name: Not on file     Number of children: 3     Years of education: Not on file     Highest education level: Not on file   Occupational History     Not on file   Tobacco Use     Smoking status: Never     Smokeless tobacco: Never   Substance and Sexual Activity     Alcohol use: No     Drug use: No     Sexual activity: Not on file   Other Topics Concern     Not on file   Social History Narrative    He used to walk for an hour most days and still takes care of his lawn and snow removal, though his grandsons are helping more with the lawn in 2018. 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 He is walking less in 2019. His wife,  Rebeka, is 6 years his tu and is providing daytime and overnight  for their daughter Loree's fraternal twins, Estrella and Jovanny, since 2019. Anthony spends 5 hours a day with Loree and the twins.     Social Determinants of Health     Financial Resource Strain: Not on file   Food Insecurity: Not on file   Transportation Needs: Not on file   Physical Activity: Not on file   Stress: Not on file   Social Connections: Not on file   Intimate Partner Violence: Not on file   Housing Stability: Not on file            Lab Results    Chemistry/lipid CBC Cardiac Enzymes/BNP/TSH/INR   Lab Results   Component Value Date    CHOL 95 01/26/2023    HDL 33 (L) 01/26/2023    TRIG 128 01/26/2023    BUN 19.5 01/26/2023     01/26/2023    CO2 27 01/26/2023    Lab Results   Component Value Date    WBC 10.7 11/30/2022    HGB 11.8 (L) 11/30/2022    HCT 36.4 (L) 11/30/2022    MCV 91 11/30/2022     11/30/2022    Lab Results   Component Value Date    TROPONINI 0.02 04/10/2021     (H) 06/14/2021    TSH 0.81 01/26/2023                Thank you for allowing me to participate in the care of your patient.      Sincerely,     Ad Reyez MD     [facility]  cc:   Christiane Cuenca MD  [address]  [place] [address]

## 2023-03-22 NOTE — PROGRESS NOTES
[facility]   [address]  [place] [address]   [email or web address]   OFFICE: [phone]     CARDIOLOGY CLINIC NOTE     Thank you, García Schreiber, for asking the [facility] Heart Care team to see Mr. Anthony Tiwari to evaluate Follow Up (ICD, NICM)        Assessment/Recommendations   Assessment:    1. 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 Dual chamber ICD in situ - functioning normally  3. NSVT - incidentally noted on ICD interrogation. Duration of up to 38 beats. Magnesium was a little low when last checked a few months ago.  4. CKD stage 3b  5. SIRIA on CPAP  6. Hypertension - blood pressure reasonable today.    Plan:  1. Start magnesium oxide 400 mg daily  2. Continue other medications without changes.  3. Encouraged a daily walk  4. Follow up with me in a year or sooner if needed.         History of Present Illness   Mr. Anthony Tiwari is a 87 year old male who presents for follow-up.     Mr. Tiwari has a known non-ischemic cardiomyopathy dating back to at least 2008 and was diagnosed with parkinson's disease in the fall of 2021. His most recent LVEF was 30-40% in January, 2022. He has a dual chamber ICD for primary prevention of SCD.    Today, Mr. Tiwari reports feeling generally well. He denies any new cardiac symptoms. He does not get regular exercise but has shoveled his sidewalk several times this winter without reported anginal symptoms.      Other than noted above, Mr. Tiwari denies any chest pain/pressure/tightness, shortness of breath at rest or with exertion, light headedness/dizziness, pre-syncope, syncope, lower extremity swelling, palpitations, paroxysmal nocturnal dyspnea (PND), or orthopnea.     Cardiac Problems and Cardiac Diagnostics     Most Recent Cardiac testing:  ECG dated 11/30/22 (personaly reviewed and interpreted): sinus rhythm with left axis deviation, Nonspecific IVCD    ECHO (report reviewed):   TTE  1/19/22  The left ventricle is mildly dilated.  There is normal left ventricular wall thickness. The visual ejection fraction  is 30-40%.  Diastolic Doppler findings (E/E' ratio and/or other parameters) suggest left  ventricular filling pressures are increased.  There is severe global hypokinesia of the left ventricle.  Normal right ventricle size and systolic function.  There is a catheter/pacemaker lead seen in the right ventricle.  The left atrium is mildly dilated.  No obvious valvular disease.     When compared to previous study on 12-, there is no significant change.  Reviewed both images and compared images to imges.    Lexiscan NM stress test 11/8/16  CONCLUSION:   1. Lexiscan stress nuclear study is negative for inducible myocardial   Ischemia.  2. Moderately reduced left ventricular systolic function with an ejection fraction of 41%.      COMMENTS:   Comparison with the images of the exam of 5/9/13 demonstrates improvement in left ventricular systolic function on the current study.       Medications  Allergies   Current Outpatient Medications   Medication Sig Dispense Refill     aspirin 81 MG EC tablet [ASPIRIN 81 MG EC TABLET] Take 81 mg by mouth bedtime.       atorvastatin (LIPITOR) 20 MG tablet [ATORVASTATIN (LIPITOR) 20 MG TABLET] Take 20 mg by mouth at bedtime.       benzonatate (TESSALON) 100 MG capsule Take 1 capsule (100 mg) by mouth 2 times daily as needed for cough 10 capsule 0     carbidopa-levodopa (SINEMET)  MG tablet Take 100 tablets by mouth 3 times daily        CONTOUR NEXT TEST test strip USE TO TEST BLOOD SUGAR DAILY       fenofibrate (TRICOR) 145 MG tablet [FENOFIBRATE (TRICOR) 145 MG TABLET] Take 145 mg by mouth daily.       insulin NPH-insulin regular (NOVOLIN 70-30) 100 unit/mL (70-30) injection [INSULIN NPH-INSULIN REGULAR (NOVOLIN 70-30) 100 UNIT/ML (70-30) INJECTION] Inject 12 Units under the skin 2 (two) times a day before meals.              lisinopril  "(PRINIVIL,ZESTRIL) 2.5 MG tablet [LISINOPRIL (PRINIVIL,ZESTRIL) 2.5 MG TABLET] Take 2.5 mg by mouth bedtime.        magnesium oxide (MAG-OX) 400 MG tablet Take 1 tablet (400 mg) by mouth daily 90 tablet 3     metFORMIN (GLUCOPHAGE) 500 MG tablet Take 1,000 mg by mouth 2 times daily (with meals) Take 2 pills in the A.M. and 2 pills at P.m.  2     metoprolol succinate ER (TOPROL XL) 100 MG 24 hr tablet Take 1 tablet (100 mg) by mouth At Bedtime 90 tablet 3     omeprazole (PRILOSEC OTC) 20 MG tablet [OMEPRAZOLE (PRILOSEC OTC) 20 MG TABLET] Take 20 mg by mouth bedtime.         Allergies   Allergen Reactions     Simvastatin Other (See Comments)     Elevated CPK        Physical Examination Review of Systems   Vitals: /50 (BP Location: Right arm, Patient Position: Sitting, Cuff Size: Adult Large)   Pulse 60   Resp 16   Ht 1.778 m (5' 10\")   Wt 82.6 kg (182 lb 3.2 oz)   BMI 26.14 kg/m    BMI= Body mass index is 26.14 kg/m .  Wt Readings from Last 3 Encounters:   03/22/23 82.6 kg (182 lb 3.2 oz)   11/30/22 72.6 kg (160 lb)   03/14/22 85.7 kg (189 lb)       General: pleasant male. No acute distress.  HENT: external ears normal. Nares patent. Mucous membranes moist.  Eyes: perrla, extraocular muscles intact. No scleral icterus.   Neck: No JVD  Lungs: clear to auscultation  COR: regular rate and rhythm, No murmurs, rubs, or gallops  Abd: nondistended  Extrem: No edema        Please refer above for cardiac ROS details.       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 Cody at [facility], getting " chemo again in 2017     Nonocclusive coronary atherosclerosis of native coronary artery     nonobstuctive by cor angio       SIRIA on CPAP 2013    he sees someone at [facility]     Other primary cardiomyopathies 2013    EF was as low as 23%     Statin intolerance 02/21/2019    history of elevated CPK on both simvastatin but less so on atorvastatin.     Type 2 diabetes mellitus (H)         Past Surgical History:   Past Surgical History:   Procedure Laterality Date     CARDIAC DEFIBRILLATOR PLACEMENT  07/24/2013    Dr. Rodriguez     CATARACT EXTRACTION W/ INTRAOCULAR LENS  IMPLANT, BILATERAL  05/2018     CYSTOSCOPY W/ LITHOLAPAXY / EHL N/A 03/19/2019    Procedure: CYSTOLITHOLAPAXY;  Surgeon: Yunior Moore MD;  Location: [facility];  Service: Urology     HERNIA REPAIR Left      PA CYSTO/URETERO W/LITHOTRIPSY &INDWELL STENT INSRT Right 03/19/2019    Procedure: CYSTOSCOPY RIGHT URETEROSCOPY LASER LITHOTRIPSY;  Surgeon: Yunior Moore MD;  Location: [facility];  Service: Urology     TOTAL HIP ARTHROPLASTY Right 2012     URETEROSCOPY          Family History:   Family History   Problem Relation Age of Onset     Rheumatic Heart Disease Brother         half brother     Dementia Brother      Cerebrovascular Disease Father      Rheumatic Heart Disease Sister      Sudden Death Sister 67.00        no autopsy     CABG Sister 81.00     No Known Problems Daughter      No Known Problems Daughter      No Known Problems Daughter      No Known Problems Granddaughter      No Known Problems Grandson      Urolithiasis No family hx of      Clotting Disorder No family hx of      Gout No family hx of         Social History:   Social History     Socioeconomic History     Marital status:      Spouse name: Not on file     Number of children: 3     Years of education: Not on file     Highest education level: Not on file   Occupational History     Not on file   Tobacco Use     Smoking status: Never      Smokeless tobacco: Never   Substance and Sexual Activity     Alcohol use: No     Drug use: No     Sexual activity: Not on file   Other Topics Concern     Not on file   Social History Narrative    He used to walk for an hour most days and still takes care of his lawn and snow removal, though his grandsons are helping more with the lawn in 2018. He volunteers at the horse [employer] at [employer] since 2000. He is walking less in 2019. His wife,  Rebeka, is 6 years his tu and is providing daytime and overnight  for their daughter Loree's fraternal twins, Estrella and Jovanny, since 2019. Anthony spends 5 hours a day with Loree and the twins.     Social Determinants of Health     Financial Resource Strain: Not on file   Food Insecurity: Not on file   Transportation Needs: Not on file   Physical Activity: Not on file   Stress: Not on file   Social Connections: Not on file   Intimate Partner Violence: Not on file   Housing Stability: Not on file            Lab Results    Chemistry/lipid CBC Cardiac Enzymes/BNP/TSH/INR   Lab Results   Component Value Date    CHOL 95 01/26/2023    HDL 33 (L) 01/26/2023    TRIG 128 01/26/2023    BUN 19.5 01/26/2023     01/26/2023    CO2 27 01/26/2023    Lab Results   Component Value Date    WBC 10.7 11/30/2022    HGB 11.8 (L) 11/30/2022    HCT 36.4 (L) 11/30/2022    MCV 91 11/30/2022     11/30/2022    Lab Results   Component Value Date    TROPONINI 0.02 04/10/2021     (H) 06/14/2021    TSH 0.81 01/26/2023

## 2023-03-22 NOTE — PATIENT INSTRUCTIONS
It was a pleasure to meet with you today.      Below is a summary of your visit.   Because your ICD interrogation today revealed a heart rhythm called NSVT I recommend starting to take a magnesium supplement, one tablet daily.  Continue your other medications without changes.  I encourage trying to get out for a walk at least 5 days per week.  Follow up with me in a year or sooner if needed.    Please do not hesitate to call the [facility] with any questions or concerns at [phone].     Sincerely,

## 2023-03-26 LAB
MDC_IDC_EPISODE_DTM: NORMAL
MDC_IDC_EPISODE_ID: NORMAL
MDC_IDC_EPISODE_TYPE: NORMAL
MDC_IDC_LEAD_IMPLANT_DT: NORMAL
MDC_IDC_LEAD_IMPLANT_DT: NORMAL
MDC_IDC_LEAD_LOCATION: NORMAL
MDC_IDC_LEAD_LOCATION: NORMAL
MDC_IDC_LEAD_LOCATION_DETAIL_1: NORMAL
MDC_IDC_LEAD_LOCATION_DETAIL_1: NORMAL
MDC_IDC_LEAD_MFG: NORMAL
MDC_IDC_LEAD_MFG: NORMAL
MDC_IDC_LEAD_MODEL: NORMAL
MDC_IDC_LEAD_MODEL: NORMAL
MDC_IDC_LEAD_POLARITY_TYPE: NORMAL
MDC_IDC_LEAD_POLARITY_TYPE: NORMAL
MDC_IDC_LEAD_SERIAL: NORMAL
MDC_IDC_LEAD_SERIAL: NORMAL
MDC_IDC_MSMT_BATTERY_DTM: NORMAL
MDC_IDC_MSMT_BATTERY_REMAINING_LONGEVITY: 30 MO
MDC_IDC_MSMT_BATTERY_REMAINING_PERCENTAGE: 38 %
MDC_IDC_MSMT_BATTERY_STATUS: NORMAL
MDC_IDC_MSMT_CAP_CHARGE_DTM: NORMAL
MDC_IDC_MSMT_CAP_CHARGE_DTM: NORMAL
MDC_IDC_MSMT_CAP_CHARGE_ENERGY: 17 J
MDC_IDC_MSMT_CAP_CHARGE_TIME: 12.1 S
MDC_IDC_MSMT_CAP_CHARGE_TIME: 3.1 S
MDC_IDC_MSMT_CAP_CHARGE_TYPE: NORMAL
MDC_IDC_MSMT_CAP_CHARGE_TYPE: NORMAL
MDC_IDC_MSMT_LEADCHNL_RA_IMPEDANCE_VALUE: 492 OHM
MDC_IDC_MSMT_LEADCHNL_RA_PACING_THRESHOLD_AMPLITUDE: 1 V
MDC_IDC_MSMT_LEADCHNL_RA_PACING_THRESHOLD_PULSEWIDTH: 0.6 MS
MDC_IDC_MSMT_LEADCHNL_RV_IMPEDANCE_VALUE: 519 OHM
MDC_IDC_MSMT_LEADCHNL_RV_PACING_THRESHOLD_AMPLITUDE: 0.7 V
MDC_IDC_MSMT_LEADCHNL_RV_PACING_THRESHOLD_PULSEWIDTH: 0.4 MS
MDC_IDC_PG_IMPLANT_DTM: NORMAL
MDC_IDC_PG_MFG: NORMAL
MDC_IDC_PG_MODEL: NORMAL
MDC_IDC_PG_SERIAL: NORMAL
MDC_IDC_PG_TYPE: NORMAL
MDC_IDC_SESS_CLINIC_NAME: NORMAL
MDC_IDC_SESS_DTM: NORMAL
MDC_IDC_SESS_TYPE: NORMAL
MDC_IDC_SET_BRADY_AT_MODE_SWITCH_MODE: NORMAL
MDC_IDC_SET_BRADY_AT_MODE_SWITCH_RATE: 170 {BEATS}/MIN
MDC_IDC_SET_BRADY_LOWRATE: 60 {BEATS}/MIN
MDC_IDC_SET_BRADY_MAX_SENSOR_RATE: 130 {BEATS}/MIN
MDC_IDC_SET_BRADY_MAX_TRACKING_RATE: 130 {BEATS}/MIN
MDC_IDC_SET_BRADY_MODE: NORMAL
MDC_IDC_SET_BRADY_PAV_DELAY_HIGH: 180 MS
MDC_IDC_SET_BRADY_PAV_DELAY_LOW: 260 MS
MDC_IDC_SET_BRADY_SAV_DELAY_HIGH: 160 MS
MDC_IDC_SET_BRADY_SAV_DELAY_LOW: 230 MS
MDC_IDC_SET_LEADCHNL_RA_PACING_AMPLITUDE: 1.8 V
MDC_IDC_SET_LEADCHNL_RA_PACING_POLARITY: NORMAL
MDC_IDC_SET_LEADCHNL_RA_PACING_PULSEWIDTH: 0.6 MS
MDC_IDC_SET_LEADCHNL_RA_SENSING_ADAPTATION_MODE: NORMAL
MDC_IDC_SET_LEADCHNL_RA_SENSING_POLARITY: NORMAL
MDC_IDC_SET_LEADCHNL_RA_SENSING_SENSITIVITY: 0.8 MV
MDC_IDC_SET_LEADCHNL_RV_PACING_AMPLITUDE: 1.5 V
MDC_IDC_SET_LEADCHNL_RV_PACING_POLARITY: NORMAL
MDC_IDC_SET_LEADCHNL_RV_PACING_PULSEWIDTH: 0.4 MS
MDC_IDC_SET_LEADCHNL_RV_SENSING_ADAPTATION_MODE: NORMAL
MDC_IDC_SET_LEADCHNL_RV_SENSING_POLARITY: NORMAL
MDC_IDC_SET_LEADCHNL_RV_SENSING_SENSITIVITY: 0.6 MV
MDC_IDC_SET_ZONE_DETECTION_INTERVAL: 300 MS
MDC_IDC_SET_ZONE_DETECTION_INTERVAL: 429 MS
MDC_IDC_SET_ZONE_TYPE: NORMAL
MDC_IDC_SET_ZONE_TYPE: NORMAL
MDC_IDC_SET_ZONE_VENDOR_TYPE: NORMAL
MDC_IDC_SET_ZONE_VENDOR_TYPE: NORMAL
MDC_IDC_STAT_AT_BURDEN_PERCENT: 1 %
MDC_IDC_STAT_AT_DTM_END: NORMAL
MDC_IDC_STAT_AT_DTM_START: NORMAL
MDC_IDC_STAT_BRADY_DTM_END: NORMAL
MDC_IDC_STAT_BRADY_DTM_START: NORMAL
MDC_IDC_STAT_BRADY_RA_PERCENT_PACED: 63 %
MDC_IDC_STAT_BRADY_RV_PERCENT_PACED: 0 %
MDC_IDC_STAT_EPISODE_RECENT_COUNT: 0
MDC_IDC_STAT_EPISODE_RECENT_COUNT: 3
MDC_IDC_STAT_EPISODE_RECENT_COUNT_DTM_END: NORMAL
MDC_IDC_STAT_EPISODE_RECENT_COUNT_DTM_START: NORMAL
MDC_IDC_STAT_EPISODE_TYPE: NORMAL
MDC_IDC_STAT_EPISODE_VENDOR_TYPE: NORMAL
MDC_IDC_STAT_TACHYTHERAPY_ATP_DELIVERED_RECENT: 0
MDC_IDC_STAT_TACHYTHERAPY_ATP_DELIVERED_TOTAL: 0
MDC_IDC_STAT_TACHYTHERAPY_RECENT_DTM_END: NORMAL
MDC_IDC_STAT_TACHYTHERAPY_RECENT_DTM_START: NORMAL
MDC_IDC_STAT_TACHYTHERAPY_SHOCKS_ABORTED_RECENT: 0
MDC_IDC_STAT_TACHYTHERAPY_SHOCKS_ABORTED_TOTAL: 0
MDC_IDC_STAT_TACHYTHERAPY_SHOCKS_DELIVERED_RECENT: 0
MDC_IDC_STAT_TACHYTHERAPY_SHOCKS_DELIVERED_TOTAL: 1
MDC_IDC_STAT_TACHYTHERAPY_TOTAL_DTM_END: NORMAL
MDC_IDC_STAT_TACHYTHERAPY_TOTAL_DTM_START: NORMAL

## 2023-06-22 ENCOUNTER — ANCILLARY PROCEDURE (OUTPATIENT)
Dept: CARDIOLOGY | Facility: CLINIC | Age: 88
End: 2023-06-22
Attending: INTERNAL MEDICINE
Payer: MEDICARE

## 2023-06-22 DIAGNOSIS — I42.8 NON-ISCHEMIC CARDIOMYOPATHY (H): ICD-10-CM

## 2023-06-22 DIAGNOSIS — Z95.810 ICD (IMPLANTABLE CARDIOVERTER-DEFIBRILLATOR) IN PLACE: ICD-10-CM

## 2023-06-22 LAB
MDC_IDC_EPISODE_DTM: NORMAL
MDC_IDC_EPISODE_DTM: NORMAL
MDC_IDC_EPISODE_DURATION: 11 S
MDC_IDC_EPISODE_ID: NORMAL
MDC_IDC_EPISODE_ID: NORMAL
MDC_IDC_EPISODE_TYPE: NORMAL
MDC_IDC_EPISODE_TYPE: NORMAL
MDC_IDC_LEAD_IMPLANT_DT: NORMAL
MDC_IDC_LEAD_IMPLANT_DT: NORMAL
MDC_IDC_LEAD_LOCATION: NORMAL
MDC_IDC_LEAD_LOCATION: NORMAL
MDC_IDC_LEAD_LOCATION_DETAIL_1: NORMAL
MDC_IDC_LEAD_LOCATION_DETAIL_1: NORMAL
MDC_IDC_LEAD_MFG: NORMAL
MDC_IDC_LEAD_MFG: NORMAL
MDC_IDC_LEAD_MODEL: NORMAL
MDC_IDC_LEAD_MODEL: NORMAL
MDC_IDC_LEAD_POLARITY_TYPE: NORMAL
MDC_IDC_LEAD_POLARITY_TYPE: NORMAL
MDC_IDC_LEAD_SERIAL: NORMAL
MDC_IDC_LEAD_SERIAL: NORMAL
MDC_IDC_MSMT_BATTERY_DTM: NORMAL
MDC_IDC_MSMT_BATTERY_REMAINING_LONGEVITY: 30 MO
MDC_IDC_MSMT_BATTERY_REMAINING_PERCENTAGE: 33 %
MDC_IDC_MSMT_BATTERY_STATUS: NORMAL
MDC_IDC_MSMT_CAP_CHARGE_DTM: NORMAL
MDC_IDC_MSMT_CAP_CHARGE_DTM: NORMAL
MDC_IDC_MSMT_CAP_CHARGE_ENERGY: 17 J
MDC_IDC_MSMT_CAP_CHARGE_TIME: 12.3 S
MDC_IDC_MSMT_CAP_CHARGE_TIME: 3.1 S
MDC_IDC_MSMT_CAP_CHARGE_TYPE: NORMAL
MDC_IDC_MSMT_CAP_CHARGE_TYPE: NORMAL
MDC_IDC_MSMT_LEADCHNL_RA_IMPEDANCE_VALUE: 541 OHM
MDC_IDC_MSMT_LEADCHNL_RA_PACING_THRESHOLD_AMPLITUDE: 1 V
MDC_IDC_MSMT_LEADCHNL_RA_PACING_THRESHOLD_PULSEWIDTH: 0.6 MS
MDC_IDC_MSMT_LEADCHNL_RV_IMPEDANCE_VALUE: 567 OHM
MDC_IDC_MSMT_LEADCHNL_RV_PACING_THRESHOLD_AMPLITUDE: 0.7 V
MDC_IDC_MSMT_LEADCHNL_RV_PACING_THRESHOLD_PULSEWIDTH: 0.4 MS
MDC_IDC_PG_IMPLANT_DTM: NORMAL
MDC_IDC_PG_MFG: NORMAL
MDC_IDC_PG_MODEL: NORMAL
MDC_IDC_PG_SERIAL: NORMAL
MDC_IDC_PG_TYPE: NORMAL
MDC_IDC_SESS_CLINIC_NAME: NORMAL
MDC_IDC_SESS_DTM: NORMAL
MDC_IDC_SESS_TYPE: NORMAL
MDC_IDC_SET_BRADY_AT_MODE_SWITCH_MODE: NORMAL
MDC_IDC_SET_BRADY_AT_MODE_SWITCH_RATE: 170 {BEATS}/MIN
MDC_IDC_SET_BRADY_LOWRATE: 60 {BEATS}/MIN
MDC_IDC_SET_BRADY_MAX_SENSOR_RATE: 130 {BEATS}/MIN
MDC_IDC_SET_BRADY_MAX_TRACKING_RATE: 130 {BEATS}/MIN
MDC_IDC_SET_BRADY_MODE: NORMAL
MDC_IDC_SET_BRADY_PAV_DELAY_HIGH: 180 MS
MDC_IDC_SET_BRADY_PAV_DELAY_LOW: 260 MS
MDC_IDC_SET_BRADY_SAV_DELAY_HIGH: 160 MS
MDC_IDC_SET_BRADY_SAV_DELAY_LOW: 230 MS
MDC_IDC_SET_LEADCHNL_RA_PACING_AMPLITUDE: 1.8 V
MDC_IDC_SET_LEADCHNL_RA_PACING_POLARITY: NORMAL
MDC_IDC_SET_LEADCHNL_RA_PACING_PULSEWIDTH: 0.6 MS
MDC_IDC_SET_LEADCHNL_RA_SENSING_ADAPTATION_MODE: NORMAL
MDC_IDC_SET_LEADCHNL_RA_SENSING_POLARITY: NORMAL
MDC_IDC_SET_LEADCHNL_RA_SENSING_SENSITIVITY: 0.8 MV
MDC_IDC_SET_LEADCHNL_RV_PACING_AMPLITUDE: 1.5 V
MDC_IDC_SET_LEADCHNL_RV_PACING_POLARITY: NORMAL
MDC_IDC_SET_LEADCHNL_RV_PACING_PULSEWIDTH: 0.4 MS
MDC_IDC_SET_LEADCHNL_RV_SENSING_ADAPTATION_MODE: NORMAL
MDC_IDC_SET_LEADCHNL_RV_SENSING_POLARITY: NORMAL
MDC_IDC_SET_LEADCHNL_RV_SENSING_SENSITIVITY: 0.6 MV
MDC_IDC_SET_ZONE_DETECTION_INTERVAL: 300 MS
MDC_IDC_SET_ZONE_DETECTION_INTERVAL: 429 MS
MDC_IDC_SET_ZONE_TYPE: NORMAL
MDC_IDC_SET_ZONE_TYPE: NORMAL
MDC_IDC_SET_ZONE_VENDOR_TYPE: NORMAL
MDC_IDC_SET_ZONE_VENDOR_TYPE: NORMAL
MDC_IDC_STAT_BRADY_DTM_END: NORMAL
MDC_IDC_STAT_BRADY_DTM_START: NORMAL
MDC_IDC_STAT_BRADY_RA_PERCENT_PACED: 72 %
MDC_IDC_STAT_BRADY_RV_PERCENT_PACED: 0 %
MDC_IDC_STAT_EPISODE_RECENT_COUNT: 0
MDC_IDC_STAT_EPISODE_RECENT_COUNT: 32
MDC_IDC_STAT_EPISODE_RECENT_COUNT_DTM_END: NORMAL
MDC_IDC_STAT_EPISODE_RECENT_COUNT_DTM_START: NORMAL
MDC_IDC_STAT_EPISODE_TYPE: NORMAL
MDC_IDC_STAT_EPISODE_VENDOR_TYPE: NORMAL
MDC_IDC_STAT_TACHYTHERAPY_ATP_DELIVERED_RECENT: 0
MDC_IDC_STAT_TACHYTHERAPY_ATP_DELIVERED_TOTAL: 0
MDC_IDC_STAT_TACHYTHERAPY_RECENT_DTM_END: NORMAL
MDC_IDC_STAT_TACHYTHERAPY_RECENT_DTM_START: NORMAL
MDC_IDC_STAT_TACHYTHERAPY_SHOCKS_ABORTED_RECENT: 0
MDC_IDC_STAT_TACHYTHERAPY_SHOCKS_ABORTED_TOTAL: 0
MDC_IDC_STAT_TACHYTHERAPY_SHOCKS_DELIVERED_RECENT: 0
MDC_IDC_STAT_TACHYTHERAPY_SHOCKS_DELIVERED_TOTAL: 1
MDC_IDC_STAT_TACHYTHERAPY_TOTAL_DTM_END: NORMAL

## 2023-06-22 PROCEDURE — 93295 DEV INTERROG REMOTE 1/2/MLT: CPT | Performed by: INTERNAL MEDICINE

## 2023-06-22 PROCEDURE — 93296 REM INTERROG EVL PM/IDS: CPT | Performed by: INTERNAL MEDICINE

## 2023-07-27 ENCOUNTER — TRANSFERRED RECORDS (OUTPATIENT)
Dept: HEALTH INFORMATION MANAGEMENT | Facility: CLINIC | Age: 88
End: 2023-07-27

## 2023-08-05 ENCOUNTER — TRANSFERRED RECORDS (OUTPATIENT)
Dept: HEALTH INFORMATION MANAGEMENT | Facility: CLINIC | Age: 88
End: 2023-08-05

## 2023-08-16 ENCOUNTER — TRANSFERRED RECORDS (OUTPATIENT)
Dept: HEALTH INFORMATION MANAGEMENT | Facility: CLINIC | Age: 88
End: 2023-08-16

## 2023-08-29 ENCOUNTER — TRANSFERRED RECORDS (OUTPATIENT)
Dept: HEALTH INFORMATION MANAGEMENT | Facility: CLINIC | Age: 88
End: 2023-08-29
Payer: MEDICARE

## 2023-09-05 ENCOUNTER — TRANSFERRED RECORDS (OUTPATIENT)
Dept: HEALTH INFORMATION MANAGEMENT | Facility: CLINIC | Age: 88
End: 2023-09-05
Payer: MEDICARE

## 2023-09-05 ENCOUNTER — MEDICAL CORRESPONDENCE (OUTPATIENT)
Dept: HEALTH INFORMATION MANAGEMENT | Facility: CLINIC | Age: 88
End: 2023-09-05
Payer: MEDICARE

## 2023-09-11 ENCOUNTER — TELEPHONE (OUTPATIENT)
Dept: CARDIOLOGY | Facility: CLINIC | Age: 88
End: 2023-09-11
Payer: MEDICARE

## 2023-09-11 NOTE — TELEPHONE ENCOUNTER
Health Call Center    Phone Message    May a detailed message be left on voicemail: yes     Reason for Call: Other: Pt was instructed to follow up with [facility] about a CT scan by Dr. Cody. There is no availability. Pt is pretty worried. Please call pt to discuss. [facility] has no availability is there an tamy he can follow up with?       Action Taken: Message routed to:  Other: Cardiology    Travel Screening: Not Applicable      Thank you!  [facility]

## 2023-09-11 NOTE — TELEPHONE ENCOUNTER
Spoke with Pt regarding call back request. Pt indicated in need of follow up appt with Dr. Reyez. Unable to connect to scheduling-message sent to scheduling team. Pt indicated Dr. Hemant Cody [phone] at [place] [facility] had CT imaging with abnormal findings.-DINORA

## 2023-09-14 ENCOUNTER — OFFICE VISIT (OUTPATIENT)
Dept: PULMONOLOGY | Facility: CLINIC | Age: 88
End: 2023-09-14
Payer: MEDICARE

## 2023-09-14 VITALS
SYSTOLIC BLOOD PRESSURE: 116 MMHG | BODY MASS INDEX: 25.57 KG/M2 | DIASTOLIC BLOOD PRESSURE: 58 MMHG | HEART RATE: 60 BPM | OXYGEN SATURATION: 98 % | HEIGHT: 70 IN | WEIGHT: 178.6 LBS

## 2023-09-14 DIAGNOSIS — R26.89 BALANCE PROBLEMS: Primary | ICD-10-CM

## 2023-09-14 DIAGNOSIS — R94.8 ABNORMAL POSITRON EMISSION TOMOGRAPHY (PET) SCAN: ICD-10-CM

## 2023-09-14 DIAGNOSIS — R22.2 BUTTOCKS NODULE: ICD-10-CM

## 2023-09-14 DIAGNOSIS — R91.8 PULMONARY NODULES: ICD-10-CM

## 2023-09-14 PROCEDURE — 99204 OFFICE O/P NEW MOD 45 MIN: CPT | Performed by: INTERNAL MEDICINE

## 2023-09-14 NOTE — PATIENT INSTRUCTIONS
US guided biopsy of gluteal nodule   Follow up chest CT in 3 months  Continue CPAP therapy at night and as needed  Follow up in 3 months

## 2023-09-14 NOTE — PROGRESS NOTES
"   PULMONARY OUTPATIENT CONSULT NOTE        Assessment:      PET (+) RLL nodule and mediastinal adenopathy   Indeterminate. Could be infectious lung nodule and reactive lymph node  Non tobacco use in the past.   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   SIRIA on CPAP  Unsteady on his feet  \"Patient has mobility limitation that impairs to do his activities of daily living. His functional limitations can be sufficiently resolved with use of a walker\".      Plan:      US guided biopsy of gluteal nodule   Follow up chest CT in 3 months  Continue CPAP therapy at night and as needed  Will prescribe a walker.   Follow up in 3 months          Gurdeep Garcia  Pulmonary / Critical Care  September 14, 2023        CC:     Chief Complaint   Patient presents with    Consult     pulm nodule      HPI:         Anthony Tiwari is a 87 year old male who presents for follow up appointment.  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   During follow up visit with oncology , Chest CT scan done on 8/16/2023 showed new   RLL nodule 1.2 cm located in right paravertebral area, no abnormal lymphadenopathy   A follow up PET CT scan 8/29/2023 showed mild avid uptake of the ground glass RLL nodule (SUV 2.6), associated mild uptake 10R lymph node (SUV 4.0) Subcutaneous tissue nodule left gluteal area (SUV 10).   Patient was schedule for US guided biopsy of gluteal lesion, procedure was not done.   Patient was " referred to this clinic for further evaluation of lung nodule.   Denies recent respiratory symptoms, mild exertional dyspnea, denies cough or wheezes.  Denies fever, chills or night sweats.   Denies using inhalers.   Denies chest pain, orthopnea, PND, or swelling of LEs.   Diagnosed with sleep apnea, uses CPAP every night.   No tobacco use in the past.      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 Cody at [facility], getting chemo again in 2017    Nonocclusive coronary atherosclerosis of native coronary artery     nonobstuctive by cor angio      SIRIA on CPAP 2013    he sees someone at [facility]    Other primary cardiomyopathies 2013    EF was as low as 23%    Statin intolerance 02/21/2019    history of elevated CPK on both simvastatin but less so on atorvastatin.    Type 2 diabetes mellitus (H)       Medications:     Current Outpatient Medications   Medication    aspirin 81 MG EC tablet    atorvastatin (LIPITOR) 20 MG tablet    benzonatate (TESSALON) 100 MG capsule    carbidopa-levodopa (SINEMET)  MG tablet    CONTOUR NEXT TEST test strip    fenofibrate (TRICOR) 145 MG tablet    insulin NPH-insulin regular (NOVOLIN 70-30) 100 unit/mL (70-30) injection    lisinopril (PRINIVIL,ZESTRIL) 2.5 MG tablet    magnesium oxide (MAG-OX) 400 MG tablet    metFORMIN (GLUCOPHAGE) 500 MG tablet    metoprolol succinate ER (TOPROL XL) 100 MG 24 hr tablet    omeprazole (PRILOSEC OTC) 20 MG tablet     No current facility-administered medications for this visit.      Social History :     Social History      Socioeconomic History    Marital status:      Spouse name: Not on file    Number of children: 3    Years of education: Not on file    Highest education level: Not on file   Occupational History    Not on file   Tobacco Use    Smoking status: Never    Smokeless tobacco: Never   Substance and Sexual Activity    Alcohol use: No    Drug use: No    Sexual activity: Not on file   Other Topics Concern    Not on file   Social History Narrative    He used to walk for an hour most days and still takes care of his lawn and snow removal, though his grandsons are helping more with the lawn in 2018. 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 He is walking less in 2019. His wife,  Rebeka, is 6 years his tu and is providing daytime and overnight  for their daughter Loree's fraternal twins, Estrella and Jovanny, since 2019. Anthony spends 5 hours a day with Loree and the twins.     Social Determinants of Health     Financial Resource Strain: Not on file   Food Insecurity: Not on file   Transportation Needs: Not on file   Physical Activity: Not on file   Stress: Not on file   Social Connections: Not on file   Intimate Partner Violence: Not on file   Housing Stability: Not on file          Family History :     Family History   Problem Relation Age of Onset    Rheumatic Heart Disease Brother         half brother    Dementia Brother     Cerebrovascular Disease Father     Rheumatic Heart Disease Sister     Sudden Death Sister 67.00        no autopsy    CABG Sister 81.00    No Known Problems Daughter     No Known Problems Daughter     No Known Problems Daughter     No Known Problems Granddaughter     No Known Problems Grandson     Urolithiasis No family hx of     Clotting Disorder No family hx of     Gout No family hx of        Review of Systems  A 12 point comprehensive review of systems was negative except as noted.     Objective:     /58 (BP Location: Left arm, Patient Position:  "Sitting, Cuff Size: Adult Regular)   Pulse 60   Ht 1.778 m (5' 10\")   Wt 81 kg (178 lb 9.6 oz)   SpO2 98%   BMI 25.63 kg/m      Gen: awake, alert, no distress  HEENT: pink conjunctiva, moist mucosa, Mallampati II/IV  Neck: no thyromegaly, masses or JVD  Lungs: clear  CV: regular, no murmurs or gallops appreciated  Abdomen: soft, NT, BS wnl  Ext: no edema  Neuro: CN II-XII intact, non focal      Diagnostic tests:      IMAGES:     CT CHEST ABDOMEN PELVIS WO ORAL WO IV CONTRAST  DATE/TIME: 6/14/2021 9:35 AM  INDICATION: Follicular lymphoma grade I, lymph nodes of inguinal region and lower limb.  COMPARISON: CT exams 8/12/2020, 11/12/2019, 5/10/2019 and 5/6/2016.  FINDINGS:   LUNGS AND PLEURA: Benign stable 3 mm subpleural nodule right major fissure. Lungs otherwise clear. No pleural effusion.  MEDIASTINUM/AXILLAE: No lymphadenopathy. AICD anterior left chest wall with lead tips in the RA and RV.  CORONARY ARTERY CALCIFICATION: Moderate.  HEPATOBILIARY: Diminutive adherent calcified gallstone versus calcified gallbladder polyp in the otherwise normal-appearing gallbladder. No bile duct dilatation. Mild diffuse hepatic steatosis. Liver otherwise normal.  PANCREAS: Normal.  SPLEEN: Normal.  ADRENAL GLANDS: Normal.  KIDNEYS/BLADDER: Three bladder calculi (16 mm, 10 mm and 10 mm) and a left-sided bladder diverticulum. Benign stable renal cysts require no follow-up.  BOWEL: Normal.  LYMPH NODES: No lymphadenopathy.  VASCULATURE: No abdominal aortic aneurysm.  PELVIC ORGANS: Normal.  MUSCULOSKELETAL: Right hip arthroplasty. No bone lesions.  IMPRESSION:  1.  No lymphadenopathy. Spleen size normal.  2.  Three bladder calculi (previously 2) and a left-sided bladder diverticulum.  3.  Diminutive adherent calcified gallstone versus calcified gallbladder polyp in the otherwise normal-appearing gallbladder.     Chest CT scan 8/16/2023  RLL nodule 1.2 cm located in right paravertebral area.  No abnormal lymphadenopathy     PET " CT scan 8/29/2023  Mild avid uptake of the ground glass RLL nodule (SUV 2.6), associated mild uptake 10R lymph node (SUV 4.0) Subcutaneous tissue nodule left gluteal area (SUV 10).

## 2023-09-18 ENCOUNTER — TELEPHONE (OUTPATIENT)
Dept: PULMONOLOGY | Facility: CLINIC | Age: 88
End: 2023-09-18
Payer: MEDICARE

## 2023-09-18 NOTE — TELEPHONE ENCOUNTER
DATE: 09/18/2023  DME PROVIDER: [facility]   SUPPLY ORDERED: walker  PROVIDER: [facility]    Faxed last note, facesheet, order to [facility] as [facility] does not carry walkers.    Rachele Farooq LPN

## 2023-09-19 ENCOUNTER — TELEPHONE (OUTPATIENT)
Dept: PULMONOLOGY | Facility: CLINIC | Age: 88
End: 2023-09-19
Payer: MEDICARE

## 2023-09-20 ENCOUNTER — HOSPITAL ENCOUNTER (OUTPATIENT)
Dept: ULTRASOUND IMAGING | Facility: HOSPITAL | Age: 88
Discharge: HOME | End: 2023-09-20
Attending: INTERNAL MEDICINE | Admitting: INTERNAL MEDICINE
Payer: MEDICARE

## 2023-09-20 DIAGNOSIS — C82.90 FOLLICULAR NON-HODGKIN'S LYMPHOMA (H): Primary | ICD-10-CM

## 2023-09-20 PROCEDURE — 84999 UNLISTED CHEMISTRY PROCEDURE: CPT | Performed by: PATHOLOGY

## 2023-09-20 PROCEDURE — 88377 M/PHMTRC ALYS ISHQUANT/SEMIQ: CPT | Performed by: PATHOLOGY

## 2023-09-20 PROCEDURE — 88184 FLOWCYTOMETRY/ TC 1 MARKER: CPT | Performed by: INTERNAL MEDICINE

## 2023-09-20 PROCEDURE — 88189 FLOWCYTOMETRY/READ 16 & >: CPT | Performed by: PATHOLOGY

## 2023-09-20 PROCEDURE — 88185 FLOWCYTOMETRY/TC ADD-ON: CPT | Performed by: INTERNAL MEDICINE

## 2023-09-20 PROCEDURE — 88365 INSITU HYBRIDIZATION (FISH): CPT | Mod: TC | Performed by: INTERNAL MEDICINE

## 2023-09-20 PROCEDURE — 272N000221 US BIOPSY ABDOMEN SOFT TISSUE

## 2023-09-21 LAB
PATH REPORT.COMMENTS IMP SPEC: ABNORMAL
PATH REPORT.COMMENTS IMP SPEC: YES
PATH REPORT.FINAL DX SPEC: ABNORMAL
PATH REPORT.MICROSCOPIC SPEC OTHER STN: ABNORMAL
PATH REPORT.RELEVANT HX SPEC: ABNORMAL

## 2023-09-21 PROCEDURE — 88365 INSITU HYBRIDIZATION (FISH): CPT | Mod: 26 | Performed by: PATHOLOGY

## 2023-09-21 PROCEDURE — 88342 IMHCHEM/IMCYTCHM 1ST ANTB: CPT | Mod: 26 | Performed by: PATHOLOGY

## 2023-09-21 PROCEDURE — 88364 INSITU HYBRIDIZATION (FISH): CPT | Mod: 26 | Performed by: PATHOLOGY

## 2023-09-21 PROCEDURE — 88305 TISSUE EXAM BY PATHOLOGIST: CPT | Mod: 26 | Performed by: PATHOLOGY

## 2023-09-21 PROCEDURE — 88333 PATH CONSLTJ SURG CYTO XM 1: CPT | Mod: 26 | Performed by: PATHOLOGY

## 2023-09-21 PROCEDURE — 88360 TUMOR IMMUNOHISTOCHEM/MANUAL: CPT | Mod: 26 | Performed by: PATHOLOGY

## 2023-09-21 PROCEDURE — 88341 IMHCHEM/IMCYTCHM EA ADD ANTB: CPT | Mod: 26 | Performed by: PATHOLOGY

## 2023-09-27 LAB
PATH REPORT.ADDENDUM SPEC: ABNORMAL
PATH REPORT.ADDENDUM SPEC: ABNORMAL
PATH REPORT.COMMENTS IMP SPEC: ABNORMAL
PATH REPORT.COMMENTS IMP SPEC: YES
PATH REPORT.FINAL DX SPEC: ABNORMAL
PATH REPORT.GROSS SPEC: ABNORMAL
PATH REPORT.RELEVANT HX SPEC: ABNORMAL
PHOTO IMAGE: ABNORMAL

## 2023-09-28 ENCOUNTER — ANCILLARY PROCEDURE (OUTPATIENT)
Dept: CARDIOLOGY | Facility: CLINIC | Age: 88
End: 2023-09-28
Attending: INTERNAL MEDICINE
Payer: MEDICARE

## 2023-09-28 DIAGNOSIS — Z95.810 ICD (IMPLANTABLE CARDIOVERTER-DEFIBRILLATOR) IN PLACE: ICD-10-CM

## 2023-09-28 DIAGNOSIS — I42.8 NON-ISCHEMIC CARDIOMYOPATHY (H): ICD-10-CM

## 2023-09-28 LAB
MDC_IDC_EPISODE_DTM: NORMAL
MDC_IDC_EPISODE_DTM: NORMAL
MDC_IDC_EPISODE_DURATION: 17 S
MDC_IDC_EPISODE_ID: NORMAL
MDC_IDC_EPISODE_ID: NORMAL
MDC_IDC_EPISODE_TYPE: NORMAL
MDC_IDC_EPISODE_TYPE: NORMAL
MDC_IDC_LEAD_IMPLANT_DT: NORMAL
MDC_IDC_LEAD_IMPLANT_DT: NORMAL
MDC_IDC_LEAD_LOCATION: NORMAL
MDC_IDC_LEAD_LOCATION: NORMAL
MDC_IDC_LEAD_LOCATION_DETAIL_1: NORMAL
MDC_IDC_LEAD_LOCATION_DETAIL_1: NORMAL
MDC_IDC_LEAD_MFG: NORMAL
MDC_IDC_LEAD_MFG: NORMAL
MDC_IDC_LEAD_MODEL: NORMAL
MDC_IDC_LEAD_MODEL: NORMAL
MDC_IDC_LEAD_POLARITY_TYPE: NORMAL
MDC_IDC_LEAD_POLARITY_TYPE: NORMAL
MDC_IDC_LEAD_SERIAL: NORMAL
MDC_IDC_LEAD_SERIAL: NORMAL
MDC_IDC_MSMT_BATTERY_DTM: NORMAL
MDC_IDC_MSMT_BATTERY_REMAINING_LONGEVITY: 24 MO
MDC_IDC_MSMT_BATTERY_REMAINING_PERCENTAGE: 30 %
MDC_IDC_MSMT_BATTERY_STATUS: NORMAL
MDC_IDC_MSMT_CAP_CHARGE_DTM: NORMAL
MDC_IDC_MSMT_CAP_CHARGE_DTM: NORMAL
MDC_IDC_MSMT_CAP_CHARGE_ENERGY: 17 J
MDC_IDC_MSMT_CAP_CHARGE_TIME: 12.4 S
MDC_IDC_MSMT_CAP_CHARGE_TIME: 3.1 S
MDC_IDC_MSMT_CAP_CHARGE_TYPE: NORMAL
MDC_IDC_MSMT_CAP_CHARGE_TYPE: NORMAL
MDC_IDC_MSMT_LEADCHNL_RA_IMPEDANCE_VALUE: 515 OHM
MDC_IDC_MSMT_LEADCHNL_RA_PACING_THRESHOLD_AMPLITUDE: 1 V
MDC_IDC_MSMT_LEADCHNL_RA_PACING_THRESHOLD_PULSEWIDTH: 0.6 MS
MDC_IDC_MSMT_LEADCHNL_RV_IMPEDANCE_VALUE: 543 OHM
MDC_IDC_MSMT_LEADCHNL_RV_PACING_THRESHOLD_AMPLITUDE: 0.7 V
MDC_IDC_MSMT_LEADCHNL_RV_PACING_THRESHOLD_PULSEWIDTH: 0.4 MS
MDC_IDC_PG_IMPLANT_DTM: NORMAL
MDC_IDC_PG_MFG: NORMAL
MDC_IDC_PG_MODEL: NORMAL
MDC_IDC_PG_SERIAL: NORMAL
MDC_IDC_PG_TYPE: NORMAL
MDC_IDC_SESS_CLINIC_NAME: NORMAL
MDC_IDC_SESS_DTM: NORMAL
MDC_IDC_SESS_TYPE: NORMAL
MDC_IDC_SET_BRADY_AT_MODE_SWITCH_MODE: NORMAL
MDC_IDC_SET_BRADY_AT_MODE_SWITCH_RATE: 170 {BEATS}/MIN
MDC_IDC_SET_BRADY_LOWRATE: 60 {BEATS}/MIN
MDC_IDC_SET_BRADY_MAX_SENSOR_RATE: 130 {BEATS}/MIN
MDC_IDC_SET_BRADY_MAX_TRACKING_RATE: 130 {BEATS}/MIN
MDC_IDC_SET_BRADY_MODE: NORMAL
MDC_IDC_SET_BRADY_PAV_DELAY_HIGH: 180 MS
MDC_IDC_SET_BRADY_PAV_DELAY_LOW: 260 MS
MDC_IDC_SET_BRADY_SAV_DELAY_HIGH: 160 MS
MDC_IDC_SET_BRADY_SAV_DELAY_LOW: 230 MS
MDC_IDC_SET_LEADCHNL_RA_PACING_AMPLITUDE: 1.8 V
MDC_IDC_SET_LEADCHNL_RA_PACING_POLARITY: NORMAL
MDC_IDC_SET_LEADCHNL_RA_PACING_PULSEWIDTH: 0.6 MS
MDC_IDC_SET_LEADCHNL_RA_SENSING_ADAPTATION_MODE: NORMAL
MDC_IDC_SET_LEADCHNL_RA_SENSING_POLARITY: NORMAL
MDC_IDC_SET_LEADCHNL_RA_SENSING_SENSITIVITY: 0.8 MV
MDC_IDC_SET_LEADCHNL_RV_PACING_AMPLITUDE: 1.5 V
MDC_IDC_SET_LEADCHNL_RV_PACING_POLARITY: NORMAL
MDC_IDC_SET_LEADCHNL_RV_PACING_PULSEWIDTH: 0.4 MS
MDC_IDC_SET_LEADCHNL_RV_SENSING_ADAPTATION_MODE: NORMAL
MDC_IDC_SET_LEADCHNL_RV_SENSING_POLARITY: NORMAL
MDC_IDC_SET_LEADCHNL_RV_SENSING_SENSITIVITY: 0.6 MV
MDC_IDC_SET_ZONE_DETECTION_INTERVAL: 300 MS
MDC_IDC_SET_ZONE_DETECTION_INTERVAL: 429 MS
MDC_IDC_SET_ZONE_TYPE: NORMAL
MDC_IDC_SET_ZONE_TYPE: NORMAL
MDC_IDC_SET_ZONE_VENDOR_TYPE: NORMAL
MDC_IDC_SET_ZONE_VENDOR_TYPE: NORMAL
MDC_IDC_STAT_BRADY_DTM_END: NORMAL
MDC_IDC_STAT_BRADY_DTM_START: NORMAL
MDC_IDC_STAT_BRADY_RA_PERCENT_PACED: 69 %
MDC_IDC_STAT_BRADY_RV_PERCENT_PACED: 0 %
MDC_IDC_STAT_EPISODE_RECENT_COUNT: 0
MDC_IDC_STAT_EPISODE_RECENT_COUNT: 1
MDC_IDC_STAT_EPISODE_RECENT_COUNT: 27
MDC_IDC_STAT_EPISODE_RECENT_COUNT_DTM_END: NORMAL
MDC_IDC_STAT_EPISODE_RECENT_COUNT_DTM_START: NORMAL
MDC_IDC_STAT_EPISODE_TYPE: NORMAL
MDC_IDC_STAT_EPISODE_VENDOR_TYPE: NORMAL
MDC_IDC_STAT_TACHYTHERAPY_ATP_DELIVERED_RECENT: 0
MDC_IDC_STAT_TACHYTHERAPY_ATP_DELIVERED_TOTAL: 0
MDC_IDC_STAT_TACHYTHERAPY_RECENT_DTM_END: NORMAL
MDC_IDC_STAT_TACHYTHERAPY_RECENT_DTM_START: NORMAL
MDC_IDC_STAT_TACHYTHERAPY_SHOCKS_ABORTED_RECENT: 0
MDC_IDC_STAT_TACHYTHERAPY_SHOCKS_ABORTED_TOTAL: 0
MDC_IDC_STAT_TACHYTHERAPY_SHOCKS_DELIVERED_RECENT: 0
MDC_IDC_STAT_TACHYTHERAPY_SHOCKS_DELIVERED_TOTAL: 1
MDC_IDC_STAT_TACHYTHERAPY_TOTAL_DTM_END: NORMAL

## 2023-09-28 PROCEDURE — 93296 REM INTERROG EVL PM/IDS: CPT | Performed by: INTERNAL MEDICINE

## 2023-09-28 PROCEDURE — 93295 DEV INTERROG REMOTE 1/2/MLT: CPT | Performed by: INTERNAL MEDICINE

## 2023-10-02 LAB — SPECIMEN STATUS: NORMAL

## 2023-10-13 ENCOUNTER — OFFICE VISIT (OUTPATIENT)
Dept: CARDIOLOGY | Facility: CLINIC | Age: 88
End: 2023-10-13
Payer: MEDICARE

## 2023-10-13 VITALS
DIASTOLIC BLOOD PRESSURE: 50 MMHG | HEART RATE: 68 BPM | WEIGHT: 186.2 LBS | SYSTOLIC BLOOD PRESSURE: 110 MMHG | BODY MASS INDEX: 26.66 KG/M2 | RESPIRATION RATE: 16 BRPM | HEIGHT: 70 IN

## 2023-10-13 DIAGNOSIS — I42.8 NON-ISCHEMIC CARDIOMYOPATHY (H): ICD-10-CM

## 2023-10-13 DIAGNOSIS — Z95.810 ICD (IMPLANTABLE CARDIOVERTER-DEFIBRILLATOR) IN PLACE: Primary | ICD-10-CM

## 2023-10-13 DIAGNOSIS — I47.29 NSVT (NONSUSTAINED VENTRICULAR TACHYCARDIA) (H): ICD-10-CM

## 2023-10-13 DIAGNOSIS — I10 ESSENTIAL HYPERTENSION: ICD-10-CM

## 2023-10-13 DIAGNOSIS — N18.32 STAGE 3B CHRONIC KIDNEY DISEASE (H): ICD-10-CM

## 2023-10-13 PROCEDURE — 99214 OFFICE O/P EST MOD 30 MIN: CPT | Performed by: INTERNAL MEDICINE

## 2023-10-13 NOTE — PROGRESS NOTES
[facility]   [id number]  [place] [address]   [email or web address]   OFFICE: [phone]     CARDIOLOGY CLINIC NOTE     Thank you, García Schreiber, for asking the [facility] Heart Care team to see Mr. 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 Not in decompensated heart failure.  Dual chamber ICD in situ - functioning normally  NSVT - incidentally noted on ICD interrogation. No sustained VT.  CKD stage 3b  SIRIA on CPAP  Hypertension - well-controlled.    Plan:  Continue medications without changes.  Encouraged a daily walk  Follow up with me in a year or sooner if needed.         History of Present Illness   Mr. Anthony Tiwari is a 88 year old male who presents for follow-up.     Mr. Tiwari has a known non-ischemic cardiomyopathy dating back to at least 2008 and was diagnosed with parkinson's disease in the fall of 2021. His most recent LVEF was 30-40% in January, 2022. He has a dual chamber ICD for primary prevention of SCD.    Mr. Tiwari reports some mild symptoms of parkinson's disease, and some weakness in his knees. No chest symptoms. He has a spot on his lung that is under evaluation. He occasionally walks with a cane.     Other than noted above, Mr. Tiwari denies any chest pain/pressure/tightness, shortness of breath at rest or with exertion, light headedness/dizziness, pre-syncope, syncope, lower extremity swelling, palpitations, paroxysmal nocturnal dyspnea (PND), or orthopnea.     Cardiac Problems and Cardiac Diagnostics     Most Recent Cardiac testing:  ECG dated 11/30/22 (personaly reviewed and interpreted): sinus rhythm with left axis deviation, Nonspecific IVCD    ICD interrogation 9/28/23  Encounter type: routine remote ICD transmission.  Device: [employer]SC[employer] E[employer]  Pacing%/Programmed: AP 69%,  0%, in DDDR  ppm mode.  Lead(s): stable  Battery  longevity: estimated 2 yrs  Presenting: normal sinus rhythm, rate 67 bpm.  Atrial high rates: none detected  Anticoagulant: no OAC  Ventricular high rates: 27 nonsustained, and one episode in monitor zone, duration 10 seconds rate 160's.  Comments: normal device function.   Plan: next remote transmission December 28, 2023.  Device follow up for the entirety of having the ICD, based on best practices determined by Heart Rhythm Society and in Compliance with Medicare guidelines.    ECHO (report reviewed):   TTE 1/19/22  The left ventricle is mildly dilated.  There is normal left ventricular wall thickness. The visual ejection fraction is 30-40%.  Diastolic Doppler findings (E/E' ratio and/or other parameters) suggest left ventricular filling pressures are increased.  There is severe global hypokinesia of the left ventricle.  Normal right ventricle size and systolic function.  There is a catheter/pacemaker lead seen in the right ventricle.  The left atrium is mildly dilated.  No obvious valvular disease.     When compared to previous study on 12-, there is no significant change.  Reviewed both images and compared images to imges.    Lexiscan NM stress test 11/8/16  CONCLUSION:   1. Lexiscan stress nuclear study is negative for inducible myocardial   Ischemia.  2. Moderately reduced left ventricular systolic function with an ejection fraction of 41%.      COMMENTS:   Comparison with the images of the exam of 5/9/13 demonstrates improvement in left ventricular systolic function on the current study.       Medications  Allergies   Current Outpatient Medications   Medication Sig Dispense Refill    aspirin 81 MG EC tablet [ASPIRIN 81 MG EC TABLET] Take 81 mg by mouth bedtime.      atorvastatin (LIPITOR) 20 MG tablet [ATORVASTATIN (LIPITOR) 20 MG TABLET] Take 20 mg by mouth at bedtime.      benzonatate (TESSALON) 100 MG capsule Take 1 capsule (100 mg) by mouth 2 times daily as needed for cough 10 capsule 0     "carbidopa-levodopa (SINEMET)  MG tablet Take 100 tablets by mouth 3 times daily       CONTOUR NEXT TEST test strip USE TO TEST BLOOD SUGAR DAILY      fenofibrate (TRICOR) 145 MG tablet [FENOFIBRATE (TRICOR) 145 MG TABLET] Take 145 mg by mouth daily.      insulin NPH-insulin regular (NOVOLIN 70-30) 100 unit/mL (70-30) injection [INSULIN NPH-INSULIN REGULAR (NOVOLIN 70-30) 100 UNIT/ML (70-30) INJECTION] Inject 12 Units under the skin 2 (two) times a day before meals.             lisinopril (PRINIVIL,ZESTRIL) 2.5 MG tablet [LISINOPRIL (PRINIVIL,ZESTRIL) 2.5 MG TABLET] Take 2.5 mg by mouth bedtime.       magnesium oxide (MAG-OX) 400 MG tablet Take 1 tablet (400 mg) by mouth daily 90 tablet 3    metFORMIN (GLUCOPHAGE) 500 MG tablet Take 1,000 mg by mouth 2 times daily (with meals) Take 2 pills in the A.M. and 2 pills at P.m.  2    metoprolol succinate ER (TOPROL XL) 100 MG 24 hr tablet Take 1 tablet (100 mg) by mouth At Bedtime 90 tablet 3      Allergies   Allergen Reactions    Simvastatin Other (See Comments)     Elevated CPK        Physical Examination Review of Systems   Vitals: /50 (BP Location: Left arm, Patient Position: Sitting, Cuff Size: Adult Regular)   Pulse 68   Resp 16   Ht 1.778 m (5' 10\")   Wt 84.5 kg (186 lb 3.2 oz)   BMI 26.72 kg/m    BMI= Body mass index is 26.72 kg/m .  Wt Readings from Last 3 Encounters:   10/13/23 84.5 kg (186 lb 3.2 oz)   09/14/23 81 kg (178 lb 9.6 oz)   03/22/23 82.6 kg (182 lb 3.2 oz)       General: pleasant male. No acute distress.  HENT: external ears normal. Nares patent. Mucous membranes moist.  Eyes: perrla, extraocular muscles intact. No scleral icterus.   Neck: No JVD  Lungs: clear to auscultation  COR: regular rate and rhythm, No murmurs, rubs, or gallops  Abd: nondistended  Extrem: No edema        Please refer above for cardiac ROS details.       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 Cody at [facility], getting chemo again in 2017    Nonocclusive coronary atherosclerosis of native coronary artery     nonobstuctive by cor angio      SIRIA on CPAP 2013    he sees someone at [facility]    Other primary cardiomyopathies 2013    EF was as low as 23%    Statin intolerance 02/21/2019    history of elevated CPK on both simvastatin but less so on atorvastatin.    Type 2 diabetes mellitus (H)         Past Surgical History:   Past Surgical History:   Procedure Laterality Date    CARDIAC DEFIBRILLATOR PLACEMENT  07/24/2013    Dr. Rodriguez    CATARACT EXTRACTION W/ INTRAOCULAR LENS  IMPLANT, BILATERAL  05/2018    CYSTOSCOPY W/ LITHOLAPAXY / EHL N/A 03/19/2019    Procedure: CYSTOLITHOLAPAXY;  Surgeon: Yunior Moore MD;  Location: [facility] OR;  Service: Urology    HERNIA REPAIR Left     PA CYSTO/URETERO W/LITHOTRIPSY &INDWELL STENT INSRT Right 03/19/2019    Procedure: CYSTOSCOPY RIGHT URETEROSCOPY LASER LITHOTRIPSY;  Surgeon: Yunior Moore MD;  Location: [facility];  Service: Urology    TOTAL HIP ARTHROPLASTY Right 2012    URETEROSCOPY          Family History:   Family History   Problem Relation Age of Onset    Rheumatic Heart Disease Brother         half brother    Dementia Brother     Cerebrovascular Disease Father     Rheumatic Heart Disease Sister     Sudden Death Sister 67.00        no autopsy    CABG Sister 81.00    No Known Problems Daughter     No Known Problems Daughter     No Known Problems Daughter     No Known Problems Granddaughter     No Known Problems Grandson     Urolithiasis No family hx of     Clotting Disorder No  family hx of     Gout No family hx of         Social History:   Social History     Socioeconomic History    Marital status:      Spouse name: Not on file    Number of children: 3    Years of education: Not on file    Highest education level: Not on file   Occupational History    Not on file   Tobacco Use    Smoking status: Never    Smokeless tobacco: Never   Vaping Use    Vaping Use: Never used   Substance and Sexual Activity    Alcohol use: No    Drug use: No    Sexual activity: Not on file   Other Topics Concern    Not on file   Social History Narrative    He used to walk for an hour most days and still takes care of his lawn and snow removal, though his grandsons are helping more with the lawn in 2018. 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 He is walking less in 2019. His wife,  Rebeka, is 6 years his tu and is providing daytime and overnight  for their daughter Loree's fraternal twins, Estrella and Jovanny, since 2019. Anthony spends 5 hours a day with Loree and the twins.     Social Determinants of Health     Financial Resource Strain: Not on file   Food Insecurity: Not on file   Transportation Needs: Not on file   Physical Activity: Not on file   Stress: Not on file   Social Connections: Not on file   Interpersonal Safety: Not on file   Housing Stability: Not on file            Lab Results    Chemistry/lipid CBC Cardiac Enzymes/BNP/TSH/INR   Lab Results   Component Value Date    CHOL 95 01/26/2023    HDL 33 (L) 01/26/2023    TRIG 128 01/26/2023    BUN 19.5 01/26/2023     01/26/2023    CO2 27 01/26/2023    Lab Results   Component Value Date    WBC 10.7 11/30/2022    HGB 11.8 (L) 11/30/2022    HCT 36.4 (L) 11/30/2022    MCV 91 11/30/2022     11/30/2022    Lab Results   Component Value Date    TROPONINI 0.02 04/10/2021     (H) 06/14/2021    TSH 0.81 01/26/2023

## 2023-10-13 NOTE — PATIENT INSTRUCTIONS
It was a pleasure to meet with you today.      Below is a summary of your visit.   Continue your medications without changes.  I encourage walking every day.  Follow up with me in a year or sooner if needed.     Please do not hesitate to call the [facility] with any questions or concerns at [phone].     Sincerely,

## 2023-10-13 NOTE — LETTER
10/13/2023    Alejandro Meredith MD  [address] [place] [address]    RE: Anthony Tiwari       Dear Colleague,     I had the pleasure of seeing Anthony Tiwari in the [facility].    [facility]   [address]  [place] [address]   [email or web address]   OFFICE: [phone]     CARDIOLOGY CLINIC NOTE     Thank you, García Schreiber, for asking the [facility] team to see Mr. 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 Not in decompensated heart failure.  Dual chamber ICD in situ - functioning normally  NSVT - incidentally noted on ICD interrogation. No sustained VT.  CKD stage 3b  SIRIA on CPAP  Hypertension - well-controlled.    Plan:  Continue medications without changes.  Encouraged a daily walk  Follow up with me in a year or sooner if needed.         History of Present Illness   Mr. Anthony Tiwari is a 88 year old male who presents for follow-up.     Mr. Tiwari has a known non-ischemic cardiomyopathy dating back to at least 2008 and was diagnosed with parkinson's disease in the fall of 2021. His most recent LVEF was 30-40% in January, 2022. He has a dual chamber ICD for primary prevention of SCD.    Mr. Tiwari reports some mild symptoms of parkinson's disease, and some weakness in his knees. No chest symptoms. He has a spot on his lung that is under evaluation. He occasionally walks with a cane.     Other than noted above, Mr. Tiwari denies any chest pain/pressure/tightness, shortness of breath at rest or with exertion, light headedness/dizziness, pre-syncope, syncope, lower extremity swelling, palpitations, paroxysmal nocturnal dyspnea (PND), or orthopnea.     Cardiac Problems and Cardiac Diagnostics     Most Recent Cardiac testing:  ECG dated 11/30/22 (personaly reviewed and interpreted): sinus rhythm with left axis  deviation, Nonspecific IVCD    ICD interrogation 9/28/23  Encounter type: routine remote ICD transmission.  Device: [employer]  Pacing%/Programmed: AP 69%,  0%, in DDDR  ppm mode.  Lead(s): stable  Battery longevity: estimated 2 yrs  Presenting: normal sinus rhythm, rate 67 bpm.  Atrial high rates: none detected  Anticoagulant: no OAC  Ventricular high rates: 27 nonsustained, and one episode in monitor zone, duration 10 seconds rate 160's.  Comments: normal device function.   Plan: next remote transmission December 28, 2023.  Device follow up for the entirety of having the ICD, based on best practices determined by [employer] Society and in Compliance with Medicare guidelines.    ECHO (report reviewed):   TTE 1/19/22  The left ventricle is mildly dilated.  There is normal left ventricular wall thickness. The visual ejection fraction is 30-40%.  Diastolic Doppler findings (E/E' ratio and/or other parameters) suggest left ventricular filling pressures are increased.  There is severe global hypokinesia of the left ventricle.  Normal right ventricle size and systolic function.  There is a catheter/pacemaker lead seen in the right ventricle.  The left atrium is mildly dilated.  No obvious valvular disease.     When compared to previous study on 12-, there is no significant change.  Reviewed both images and compared images to imges.    Lexiscan NM stress test 11/8/16  CONCLUSION:   1. Lexiscan stress nuclear study is negative for inducible myocardial   Ischemia.  2. Moderately reduced left ventricular systolic function with an ejection fraction of 41%.      COMMENTS:   Comparison with the images of the exam of 5/9/13 demonstrates improvement in left ventricular systolic function on the current study.       Medications  Allergies   Current Outpatient Medications   Medication Sig Dispense Refill    aspirin 81 MG EC tablet [ASPIRIN 81 MG EC TABLET] Take 81 mg by mouth bedtime.      atorvastatin (LIPITOR)  "20 MG tablet [ATORVASTATIN (LIPITOR) 20 MG TABLET] Take 20 mg by mouth at bedtime.      benzonatate (TESSALON) 100 MG capsule Take 1 capsule (100 mg) by mouth 2 times daily as needed for cough 10 capsule 0    carbidopa-levodopa (SINEMET)  MG tablet Take 100 tablets by mouth 3 times daily       CONTOUR NEXT TEST test strip USE TO TEST BLOOD SUGAR DAILY      fenofibrate (TRICOR) 145 MG tablet [FENOFIBRATE (TRICOR) 145 MG TABLET] Take 145 mg by mouth daily.      insulin NPH-insulin regular (NOVOLIN 70-30) 100 unit/mL (70-30) injection [INSULIN NPH-INSULIN REGULAR (NOVOLIN 70-30) 100 UNIT/ML (70-30) INJECTION] Inject 12 Units under the skin 2 (two) times a day before meals.             lisinopril (PRINIVIL,ZESTRIL) 2.5 MG tablet [LISINOPRIL (PRINIVIL,ZESTRIL) 2.5 MG TABLET] Take 2.5 mg by mouth bedtime.       magnesium oxide (MAG-OX) 400 MG tablet Take 1 tablet (400 mg) by mouth daily 90 tablet 3    metFORMIN (GLUCOPHAGE) 500 MG tablet Take 1,000 mg by mouth 2 times daily (with meals) Take 2 pills in the A.M. and 2 pills at P.m.  2    metoprolol succinate ER (TOPROL XL) 100 MG 24 hr tablet Take 1 tablet (100 mg) by mouth At Bedtime 90 tablet 3      Allergies   Allergen Reactions    Simvastatin Other (See Comments)     Elevated CPK        Physical Examination Review of Systems   Vitals: /50 (BP Location: Left arm, Patient Position: Sitting, Cuff Size: Adult Regular)   Pulse 68   Resp 16   Ht 1.778 m (5' 10\")   Wt 84.5 kg (186 lb 3.2 oz)   BMI 26.72 kg/m    BMI= Body mass index is 26.72 kg/m .  Wt Readings from Last 3 Encounters:   10/13/23 84.5 kg (186 lb 3.2 oz)   09/14/23 81 kg (178 lb 9.6 oz)   03/22/23 82.6 kg (182 lb 3.2 oz)       General: pleasant male. No acute distress.  HENT: external ears normal. Nares patent. Mucous membranes moist.  Eyes: perrla, extraocular muscles intact. No scleral icterus.   Neck: No JVD  Lungs: clear to auscultation  COR: regular rate and rhythm, No murmurs, rubs, or " gallops  Abd: nondistended  Extrem: No edema        Please refer above for cardiac ROS details.       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 Cody at [facility], getting chemo again in 2017    Nonocclusive coronary atherosclerosis of native coronary artery     nonobstuctive by cor angio      SIRIA on CPAP 2013    he sees someone at [facility]    Other primary cardiomyopathies 2013    EF was as low as 23%    Statin intolerance 02/21/2019    history of elevated CPK on both simvastatin but less so on atorvastatin.    Type 2 diabetes mellitus (H)         Past Surgical History:   Past Surgical History:   Procedure Laterality Date    CARDIAC DEFIBRILLATOR PLACEMENT  07/24/2013    Dr. Rodriguez    CATARACT EXTRACTION W/ INTRAOCULAR LENS  IMPLANT, BILATERAL  05/2018    CYSTOSCOPY W/ LITHOLAPAXY / EHL N/A 03/19/2019    Procedure: CYSTOLITHOLAPAXY;  Surgeon: Yunior Moore MD;  Location: [facility] OR;  Service: Urology    HERNIA REPAIR Left     WA CYSTO/URETERO W/LITHOTRIPSY &INDWELL STENT INSRT Right 03/19/2019    Procedure: CYSTOSCOPY RIGHT URETEROSCOPY LASER LITHOTRIPSY;  Surgeon: Yunior Moore MD;  Location: [facility] OR;  Service: Urology    TOTAL HIP ARTHROPLASTY Right 2012    URETEROSCOPY          Family History:   Family History   Problem Relation Age of Onset    Rheumatic Heart Disease Brother         half brother    Dementia Brother     Cerebrovascular Disease Father     Rheumatic Heart Disease Sister     Sudden Death Sister 67.00        no autopsy    CABG Sister 81.00     No Known Problems Daughter     No Known Problems Daughter     No Known Problems Daughter     No Known Problems Granddaughter     No Known Problems Grandson     Urolithiasis No family hx of     Clotting Disorder No family hx of     Gout No family hx of         Social History:   Social History     Socioeconomic History    Marital status:      Spouse name: Not on file    Number of children: 3    Years of education: Not on file    Highest education level: Not on file   Occupational History    Not on file   Tobacco Use    Smoking status: Never    Smokeless tobacco: Never   Vaping Use    Vaping Use: Never used   Substance and Sexual Activity    Alcohol use: No    Drug use: No    Sexual activity: Not on file   Other Topics Concern    Not on file   Social History Narrative    He used to walk for an hour most days and still takes care of his lawn and snow removal, though his grandsons are helping more with the lawn in 2018. 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 He is walking less in 2019. His wife,  Rebeka, is 6 years his tu and is providing daytime and overnight  for their daughter Loree's fraternal twins, Estrella and Jovanny, since 2019. Anthony spends 5 hours a day with Loree and the twins.     Social Determinants of Health     Financial Resource Strain: Not on file   Food Insecurity: Not on file   Transportation Needs: Not on file   Physical Activity: Not on file   Stress: Not on file   Social Connections: Not on file   Interpersonal Safety: Not on file   Housing Stability: Not on file            Lab Results    Chemistry/lipid CBC Cardiac Enzymes/BNP/TSH/INR   Lab Results   Component Value Date    CHOL 95 01/26/2023    HDL 33 (L) 01/26/2023    TRIG 128 01/26/2023    BUN 19.5 01/26/2023     01/26/2023    CO2 27 01/26/2023    Lab Results   Component Value Date    WBC 10.7 11/30/2022    HGB 11.8 (L) 11/30/2022    HCT 36.4 (L) 11/30/2022    MCV 91 11/30/2022    PLT  237 11/30/2022    Lab Results   Component Value Date    TROPONINI 0.02 04/10/2021     (H) 06/14/2021    TSH 0.81 01/26/2023                Thank you for allowing me to participate in the care of your patient.      Sincerely,     Ad Reyez MD     [facility]  cc:   Ad Reyez MD  [address]  [place] [phone]

## 2023-12-18 ENCOUNTER — OFFICE VISIT (OUTPATIENT)
Dept: PULMONOLOGY | Facility: CLINIC | Age: 88
End: 2023-12-18
Attending: INTERNAL MEDICINE
Payer: MEDICARE

## 2023-12-18 ENCOUNTER — HOSPITAL ENCOUNTER (OUTPATIENT)
Dept: CT IMAGING | Facility: HOSPITAL | Age: 88
Discharge: HOME | End: 2023-12-18
Attending: INTERNAL MEDICINE | Admitting: INTERNAL MEDICINE
Payer: MEDICARE

## 2023-12-18 VITALS
WEIGHT: 186 LBS | SYSTOLIC BLOOD PRESSURE: 130 MMHG | OXYGEN SATURATION: 99 % | BODY MASS INDEX: 26.69 KG/M2 | DIASTOLIC BLOOD PRESSURE: 77 MMHG | HEART RATE: 60 BPM

## 2023-12-18 DIAGNOSIS — Z85.72 HISTORY OF FOLLICULAR LYMPHOMA: ICD-10-CM

## 2023-12-18 DIAGNOSIS — R91.8 PULMONARY NODULES: Primary | ICD-10-CM

## 2023-12-18 DIAGNOSIS — J31.0 CHRONIC RHINITIS: ICD-10-CM

## 2023-12-18 DIAGNOSIS — G47.33 OSA (OBSTRUCTIVE SLEEP APNEA): ICD-10-CM

## 2023-12-18 DIAGNOSIS — R19.7 OVERFLOW DIARRHEA: ICD-10-CM

## 2023-12-18 DIAGNOSIS — K59.1 FUNCTIONAL DIARRHEA: ICD-10-CM

## 2023-12-18 DIAGNOSIS — R91.8 PULMONARY NODULES: ICD-10-CM

## 2023-12-18 PROCEDURE — 71250 CT THORAX DX C-: CPT | Mod: ME

## 2023-12-18 PROCEDURE — 99214 OFFICE O/P EST MOD 30 MIN: CPT | Performed by: INTERNAL MEDICINE

## 2023-12-18 RX ORDER — LOPERAMIDE HYDROCHLORIDE 2 MG/1
2 TABLET ORAL 4 TIMES DAILY PRN
Qty: 28 TABLET | Refills: 0 | Status: SHIPPED | OUTPATIENT
Start: 2023-12-18 | End: 2023-12-18

## 2023-12-18 RX ORDER — IPRATROPIUM BROMIDE 42 UG/1
2 SPRAY, METERED NASAL 2 TIMES DAILY
Qty: 15 ML | Refills: 12 | Status: SHIPPED | OUTPATIENT
Start: 2023-12-18 | End: 2024-09-12

## 2023-12-18 RX ORDER — POLYETHYLENE GLYCOL 3350 17 G/17G
17 POWDER, FOR SOLUTION ORAL DAILY
Qty: 510 G | Refills: 12 | Status: SHIPPED | OUTPATIENT
Start: 2023-12-18 | End: 2024-09-12

## 2023-12-18 NOTE — PATIENT INSTRUCTIONS
Miralax 17 g dissolve in 8 oz of water daily as needed  Atrovent 2 sprays each nostril twice a day   Continue CPAP therapy at night and as needed  Follow up in 6 months

## 2023-12-18 NOTE — PROGRESS NOTES
"   PULMONARY OUTPATIENT PROGRESS NOTE        Assessment:      Resolved RLL nodule and mediastinal adenopathy   PET CT 8/2023 showed increase uptake RLL nodule and mediastinal adenopathy.   Though to be inflammatory.   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   Chronic rhinitis   Atrovent nasal spray   SIRIA on CPAP  Overflow diarrhea  Start miralax daily   Non ischemic cardiomyopathy, s/p dual chamber ICD  Unsteady on his feet  \"Patient has mobility limitation that impairs to do his activities of daily living. His functional limitations can be sufficiently resolved with use of a walker\".      Plan:      Miralax 17 g dissolve in 8 oz of water daily as needed  Atrovent 2 sprays each nostril twice a day   Continue CPAP therapy at night and as needed  Follow up in 6 months        Gurdeep Garcia  Pulmonary / Critical Care  12/18/23       CC:     Chief Complaint   Patient presents with    Follow Up     3 MONTH FOLLOW UP:  SAME DAY CT  Balance problems  Pulmonary nodules  Abnormal positron emission tomography (PET) scan  Buttocks nodule         HPI:         Anthony Tiwari is a 88 year old male who presents for follow up appointment.  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   During " follow up visit with oncology , Chest CT scan done on 8/16/2023 showed new   RLL nodule 1.2 cm located in right paravertebral area, no abnormal lymphadenopathy   A follow up PET CT scan 8/29/2023 showed mild avid uptake of the ground glass RLL nodule (SUV 2.6), associated mild uptake 10R lymph node (SUV 4.0) Subcutaneous tissue nodule left gluteal area (SUV 10). US guided biopsy of gluteal lesion was positive for low grade follicular lymphoma.   Follow up chest CT scan 12/18 showed resolution of RLL nodule.   Reports mild exertional dyspnea, denies cough or wheezes.  Denies using inhalers.   Reports runny nose day and night. Denies nasal congestion. No sinus tenderness.   Denies chest pain, orthopnea, PND, or swelling of LEs.  Reports diarrhea, sits on the toilet often, small amount of loose stool, feels that did not fully evacuate.   Denies nausea, vomiting or abdominal pain.   Denies fever, chills or night sweats.   Diagnosed with sleep apnea, uses CPAP every night.   No tobacco use in the past.      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 Cody at [facility], getting chemo again in 2017    Nonocclusive coronary atherosclerosis of native coronary artery     nonobstuctive by cor angio      SIRIA on CPAP 2013    he sees someone at [facility]    Other primary cardiomyopathies 2013    EF was as low as 23%    Statin intolerance 02/21/2019    history of elevated CPK on both simvastatin but less so on atorvastatin.    Type 2 diabetes mellitus (H)       Medications:     Current Outpatient Medications    Medication    aspirin 81 MG EC tablet    atorvastatin (LIPITOR) 20 MG tablet    benzonatate (TESSALON) 100 MG capsule    carbidopa-levodopa (SINEMET)  MG tablet    CONTOUR NEXT TEST test strip    insulin NPH-insulin regular (NOVOLIN 70-30) 100 unit/mL (70-30) injection    ipratropium (ATROVENT) 0.06 % nasal spray    lisinopril (PRINIVIL,ZESTRIL) 2.5 MG tablet    magnesium oxide (MAG-OX) 400 MG tablet    metFORMIN (GLUCOPHAGE) 500 MG tablet    metoprolol succinate ER (TOPROL XL) 100 MG 24 hr tablet    polyethylene glycol (MIRALAX) 17 GM/Dose powder     No current facility-administered medications for this visit.      Social History :     Social History     Socioeconomic History    Marital status:      Spouse name: Not on file    Number of children: 3    Years of education: Not on file    Highest education level: Not on file   Occupational History    Not on file   Tobacco Use    Smoking status: Never    Smokeless tobacco: Never   Vaping Use    Vaping Use: Never used   Substance and Sexual Activity    Alcohol use: No    Drug use: No    Sexual activity: Not on file   Other Topics Concern    Not on file   Social History Narrative    He used to walk for an hour most days and still takes care of his lawn and snow removal, though his grandsons are helping more with the lawn in 2018. 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 He is walking less in 2019. His wife,  Rebeka, is 6 years his tu and is providing daytime and overnight  for their daughter Loree's fraternal twins, Estrella and Jovanny, since 2019. Anthony spends 5 hours a day with Loree and the twins.     Social Determinants of Health     Financial Resource Strain: Not on file   Food Insecurity: Not on file   Transportation Needs: Not on file   Physical Activity: Not on file   Stress: Not on file   Social Connections: Not on file   Interpersonal Safety: Not on file   Housing Stability: Not on file          Family  History :     Family History   Problem Relation Age of Onset    Rheumatic Heart Disease Brother         half brother    Dementia Brother     Cerebrovascular Disease Father     Rheumatic Heart Disease Sister     Sudden Death Sister 67.00        no autopsy    CABG Sister 81.00    No Known Problems Daughter     No Known Problems Daughter     No Known Problems Daughter     No Known Problems Granddaughter     No Known Problems Grandson     Urolithiasis No family hx of     Clotting Disorder No family hx of     Gout No family hx of        Review of Systems  A 12 point comprehensive review of systems was negative except as noted.     Objective:     /77 (BP Location: Left arm, Patient Position: Sitting, Cuff Size: Adult Regular)   Pulse 60   Wt 84.4 kg (186 lb)   SpO2 99%   BMI 26.69 kg/m      Gen: awake, alert, no distress  HEENT: pink conjunctiva, moist mucosa, Mallampati II/IV  Neck: no thyromegaly, masses or JVD  Lungs: clear  CV: regular, no murmurs or gallops appreciated  Abdomen: soft, NT, BS wnl  Ext: no edema  Neuro: CN II-XII intact, non focal      Diagnostic tests:        PATHOLOGY: (9/20/2023)     A(1). Buttock, Left, :  - Low-grade (grade 1-2 of 3) follicular lymphoma  - Pending flow cytometry ([facility])  - Pending FISH studies ([facility])    IMAGES:     CT CHEST ABDOMEN PELVIS WO ORAL WO IV CONTRAST  DATE/TIME: 6/14/2021 9:35 AM  INDICATION: Follicular lymphoma grade I, lymph nodes of inguinal region and lower limb.  COMPARISON: CT exams 8/12/2020, 11/12/2019, 5/10/2019 and 5/6/2016.  FINDINGS:   LUNGS AND PLEURA: Benign stable 3 mm subpleural nodule right major fissure. Lungs otherwise clear. No pleural effusion.  MEDIASTINUM/AXILLAE: No lymphadenopathy. AICD anterior left chest wall with lead tips in the RA and RV.  CORONARY ARTERY CALCIFICATION: Moderate.  HEPATOBILIARY: Diminutive adherent calcified gallstone versus calcified gallbladder polyp in the otherwise normal-appearing gallbladder. No bile duct  dilatation. Mild diffuse hepatic steatosis. Liver otherwise normal.  PANCREAS: Normal.  SPLEEN: Normal.  ADRENAL GLANDS: Normal.  KIDNEYS/BLADDER: Three bladder calculi (16 mm, 10 mm and 10 mm) and a left-sided bladder diverticulum. Benign stable renal cysts require no follow-up.  BOWEL: Normal.  LYMPH NODES: No lymphadenopathy.  VASCULATURE: No abdominal aortic aneurysm.  PELVIC ORGANS: Normal.  MUSCULOSKELETAL: Right hip arthroplasty. No bone lesions.  IMPRESSION:  1.  No lymphadenopathy. Spleen size normal.  2.  Three bladder calculi (previously 2) and a left-sided bladder diverticulum.  3.  Diminutive adherent calcified gallstone versus calcified gallbladder polyp in the otherwise normal-appearing gallbladder.     Chest CT scan 8/16/2023  RLL nodule 1.2 cm located in right paravertebral area.  No abnormal lymphadenopathy     PET CT scan 8/29/2023  Mild avid uptake of the ground glass RLL nodule (SUV 2.6), associated mild uptake 10R lymph node (SUV 4.0) Subcutaneous tissue nodule left gluteal area (SUV 10).     CT CHEST W/O CONTRAST  DATE: 12/18/2023  INDICATION: follow up lung nodule  COMPARISON: CT 08/16/2023, 08/12/2022.  FINDINGS:   LUNGS AND PLEURA: The right lower lobe lung nodule has resolved. No effusion. No new nodule.  MEDIASTINUM/AXILLAE: No lymphadenopathy. No thoracic aortic aneurysm.  CORONARY ARTERY CALCIFICATION: Mild.  UPPER ABDOMEN: Tiny gallbladder hyperdensity suggests a small stone. Left upper renal 1 mm nonobstructive stone. Stable 5 mm left posterior renal exophytic nodule is most likely a hyperdense cyst. Stable PET negative left renal 5.9 cm fluid density lesion with some intrinsic linear calcification.  MUSCULOSKELETAL: Thoracic DISH.  IMPRESSION:   1.  Resolution right lower lobe nodule.  2.  No evidence of malignanct recurrence.  3.  Possible small gallstone.  4.  Left renal 1 mm nonobstructive stone.

## 2023-12-28 ENCOUNTER — ANCILLARY PROCEDURE (OUTPATIENT)
Dept: CARDIOLOGY | Facility: CLINIC | Age: 88
End: 2023-12-28
Attending: INTERNAL MEDICINE
Payer: MEDICARE

## 2023-12-28 DIAGNOSIS — Z95.810 ICD (IMPLANTABLE CARDIOVERTER-DEFIBRILLATOR) IN PLACE: ICD-10-CM

## 2023-12-28 DIAGNOSIS — I42.8 NON-ISCHEMIC CARDIOMYOPATHY (H): ICD-10-CM

## 2023-12-28 LAB
MDC_IDC_EPISODE_DTM: NORMAL
MDC_IDC_EPISODE_DTM: NORMAL
MDC_IDC_EPISODE_DURATION: 10 S
MDC_IDC_EPISODE_DURATION: 8 S
MDC_IDC_EPISODE_ID: NORMAL
MDC_IDC_EPISODE_ID: NORMAL
MDC_IDC_EPISODE_TYPE: NORMAL
MDC_IDC_EPISODE_TYPE: NORMAL
MDC_IDC_EPISODE_TYPE_INDUCED: NO
MDC_IDC_EPISODE_TYPE_INDUCED: NO
MDC_IDC_LEAD_CONNECTION_STATUS: NORMAL
MDC_IDC_LEAD_CONNECTION_STATUS: NORMAL
MDC_IDC_LEAD_IMPLANT_DT: NORMAL
MDC_IDC_LEAD_IMPLANT_DT: NORMAL
MDC_IDC_LEAD_LOCATION: NORMAL
MDC_IDC_LEAD_LOCATION: NORMAL
MDC_IDC_LEAD_LOCATION_DETAIL_1: NORMAL
MDC_IDC_LEAD_LOCATION_DETAIL_1: NORMAL
MDC_IDC_LEAD_MFG: NORMAL
MDC_IDC_LEAD_MFG: NORMAL
MDC_IDC_LEAD_MODEL: NORMAL
MDC_IDC_LEAD_MODEL: NORMAL
MDC_IDC_LEAD_POLARITY_TYPE: NORMAL
MDC_IDC_LEAD_POLARITY_TYPE: NORMAL
MDC_IDC_LEAD_SERIAL: NORMAL
MDC_IDC_LEAD_SERIAL: NORMAL
MDC_IDC_MSMT_BATTERY_DTM: NORMAL
MDC_IDC_MSMT_BATTERY_REMAINING_LONGEVITY: 24 MO
MDC_IDC_MSMT_BATTERY_REMAINING_PERCENTAGE: 28 %
MDC_IDC_MSMT_BATTERY_STATUS: NORMAL
MDC_IDC_MSMT_CAP_CHARGE_DTM: NORMAL
MDC_IDC_MSMT_CAP_CHARGE_DTM: NORMAL
MDC_IDC_MSMT_CAP_CHARGE_ENERGY: 17 J
MDC_IDC_MSMT_CAP_CHARGE_TIME: 12.6 S
MDC_IDC_MSMT_CAP_CHARGE_TIME: 3.1 S
MDC_IDC_MSMT_CAP_CHARGE_TYPE: NORMAL
MDC_IDC_MSMT_CAP_CHARGE_TYPE: NORMAL
MDC_IDC_MSMT_LEADCHNL_RA_IMPEDANCE_VALUE: 474 OHM
MDC_IDC_MSMT_LEADCHNL_RA_PACING_THRESHOLD_AMPLITUDE: 1 V
MDC_IDC_MSMT_LEADCHNL_RA_PACING_THRESHOLD_PULSEWIDTH: 0.6 MS
MDC_IDC_MSMT_LEADCHNL_RV_IMPEDANCE_VALUE: 535 OHM
MDC_IDC_MSMT_LEADCHNL_RV_PACING_THRESHOLD_AMPLITUDE: 0.7 V
MDC_IDC_MSMT_LEADCHNL_RV_PACING_THRESHOLD_PULSEWIDTH: 0.4 MS
MDC_IDC_PG_IMPLANT_DTM: NORMAL
MDC_IDC_PG_MFG: NORMAL
MDC_IDC_PG_MODEL: NORMAL
MDC_IDC_PG_SERIAL: NORMAL
MDC_IDC_PG_TYPE: NORMAL
MDC_IDC_SESS_CLINIC_NAME: NORMAL
MDC_IDC_SESS_DTM: NORMAL
MDC_IDC_SESS_TYPE: NORMAL
MDC_IDC_SET_BRADY_AT_MODE_SWITCH_MODE: NORMAL
MDC_IDC_SET_BRADY_AT_MODE_SWITCH_RATE: 170 {BEATS}/MIN
MDC_IDC_SET_BRADY_LOWRATE: 60 {BEATS}/MIN
MDC_IDC_SET_BRADY_MAX_SENSOR_RATE: 130 {BEATS}/MIN
MDC_IDC_SET_BRADY_MAX_TRACKING_RATE: 130 {BEATS}/MIN
MDC_IDC_SET_BRADY_MODE: NORMAL
MDC_IDC_SET_BRADY_PAV_DELAY_HIGH: 180 MS
MDC_IDC_SET_BRADY_PAV_DELAY_LOW: 260 MS
MDC_IDC_SET_BRADY_SAV_DELAY_HIGH: 160 MS
MDC_IDC_SET_BRADY_SAV_DELAY_LOW: 230 MS
MDC_IDC_SET_LEADCHNL_RA_PACING_AMPLITUDE: 1.8 V
MDC_IDC_SET_LEADCHNL_RA_PACING_POLARITY: NORMAL
MDC_IDC_SET_LEADCHNL_RA_PACING_PULSEWIDTH: 0.6 MS
MDC_IDC_SET_LEADCHNL_RA_SENSING_ADAPTATION_MODE: NORMAL
MDC_IDC_SET_LEADCHNL_RA_SENSING_POLARITY: NORMAL
MDC_IDC_SET_LEADCHNL_RA_SENSING_SENSITIVITY: 0.8 MV
MDC_IDC_SET_LEADCHNL_RV_PACING_AMPLITUDE: 1.5 V
MDC_IDC_SET_LEADCHNL_RV_PACING_POLARITY: NORMAL
MDC_IDC_SET_LEADCHNL_RV_PACING_PULSEWIDTH: 0.4 MS
MDC_IDC_SET_LEADCHNL_RV_SENSING_ADAPTATION_MODE: NORMAL
MDC_IDC_SET_LEADCHNL_RV_SENSING_POLARITY: NORMAL
MDC_IDC_SET_LEADCHNL_RV_SENSING_SENSITIVITY: 0.6 MV
MDC_IDC_SET_ZONE_DETECTION_INTERVAL: 300 MS
MDC_IDC_SET_ZONE_DETECTION_INTERVAL: 429 MS
MDC_IDC_SET_ZONE_STATUS: NORMAL
MDC_IDC_SET_ZONE_STATUS: NORMAL
MDC_IDC_SET_ZONE_TYPE: NORMAL
MDC_IDC_SET_ZONE_TYPE: NORMAL
MDC_IDC_SET_ZONE_VENDOR_TYPE: NORMAL
MDC_IDC_SET_ZONE_VENDOR_TYPE: NORMAL
MDC_IDC_STAT_BRADY_DTM_END: NORMAL
MDC_IDC_STAT_BRADY_DTM_START: NORMAL
MDC_IDC_STAT_BRADY_RA_PERCENT_PACED: 65 %
MDC_IDC_STAT_BRADY_RV_PERCENT_PACED: 0 %
MDC_IDC_STAT_EPISODE_RECENT_COUNT: 0
MDC_IDC_STAT_EPISODE_RECENT_COUNT: 0
MDC_IDC_STAT_EPISODE_RECENT_COUNT: 1
MDC_IDC_STAT_EPISODE_RECENT_COUNT: 35
MDC_IDC_STAT_EPISODE_RECENT_COUNT_DTM_END: NORMAL
MDC_IDC_STAT_EPISODE_RECENT_COUNT_DTM_START: NORMAL
MDC_IDC_STAT_EPISODE_TYPE: NORMAL
MDC_IDC_STAT_EPISODE_VENDOR_TYPE: NORMAL
MDC_IDC_STAT_TACHYTHERAPY_ATP_DELIVERED_RECENT: 0
MDC_IDC_STAT_TACHYTHERAPY_ATP_DELIVERED_TOTAL: 0
MDC_IDC_STAT_TACHYTHERAPY_RECENT_DTM_END: NORMAL
MDC_IDC_STAT_TACHYTHERAPY_RECENT_DTM_START: NORMAL
MDC_IDC_STAT_TACHYTHERAPY_SHOCKS_ABORTED_RECENT: 0
MDC_IDC_STAT_TACHYTHERAPY_SHOCKS_ABORTED_TOTAL: 0
MDC_IDC_STAT_TACHYTHERAPY_SHOCKS_DELIVERED_RECENT: 0
MDC_IDC_STAT_TACHYTHERAPY_SHOCKS_DELIVERED_TOTAL: 1
MDC_IDC_STAT_TACHYTHERAPY_TOTAL_DTM_END: NORMAL

## 2023-12-28 PROCEDURE — 93296 REM INTERROG EVL PM/IDS: CPT | Performed by: INTERNAL MEDICINE

## 2023-12-28 PROCEDURE — 93295 DEV INTERROG REMOTE 1/2/MLT: CPT | Performed by: INTERNAL MEDICINE

## 2024-02-14 DIAGNOSIS — I42.8 NICM (NONISCHEMIC CARDIOMYOPATHY) (H): ICD-10-CM

## 2024-02-14 RX ORDER — METOPROLOL SUCCINATE 100 MG/1
100 TABLET, EXTENDED RELEASE ORAL AT BEDTIME
Qty: 90 TABLET | Refills: 1 | Status: SHIPPED | OUTPATIENT
Start: 2024-02-14

## 2024-03-01 DIAGNOSIS — I47.29 NSVT (NONSUSTAINED VENTRICULAR TACHYCARDIA) (H): ICD-10-CM

## 2024-03-01 RX ORDER — LANOLIN ALCOHOL/MO/W.PET/CERES
400 CREAM (GRAM) TOPICAL DAILY
Qty: 90 TABLET | Refills: 0 | Status: SHIPPED | OUTPATIENT
Start: 2024-03-01 | End: 2024-05-31

## 2024-03-25 ENCOUNTER — ANCILLARY PROCEDURE (OUTPATIENT)
Dept: CARDIOLOGY | Facility: CLINIC | Age: 89
End: 2024-03-25
Attending: INTERNAL MEDICINE
Payer: MEDICARE

## 2024-03-25 DIAGNOSIS — Z95.810 DUAL ICD (IMPLANTABLE CARDIOVERTER-DEFIBRILLATOR) IN PLACE: ICD-10-CM

## 2024-03-25 DIAGNOSIS — I42.8 NICM (NONISCHEMIC CARDIOMYOPATHY) (H): ICD-10-CM

## 2024-03-30 LAB
MDC_IDC_EPISODE_DTM: NORMAL
MDC_IDC_EPISODE_ID: NORMAL
MDC_IDC_EPISODE_TYPE: NORMAL
MDC_IDC_LEAD_CONNECTION_STATUS: NORMAL
MDC_IDC_LEAD_CONNECTION_STATUS: NORMAL
MDC_IDC_LEAD_IMPLANT_DT: NORMAL
MDC_IDC_LEAD_IMPLANT_DT: NORMAL
MDC_IDC_LEAD_LOCATION: NORMAL
MDC_IDC_LEAD_LOCATION: NORMAL
MDC_IDC_LEAD_LOCATION_DETAIL_1: NORMAL
MDC_IDC_LEAD_LOCATION_DETAIL_1: NORMAL
MDC_IDC_LEAD_MFG: NORMAL
MDC_IDC_LEAD_MFG: NORMAL
MDC_IDC_LEAD_MODEL: NORMAL
MDC_IDC_LEAD_MODEL: NORMAL
MDC_IDC_LEAD_POLARITY_TYPE: NORMAL
MDC_IDC_LEAD_POLARITY_TYPE: NORMAL
MDC_IDC_LEAD_SERIAL: NORMAL
MDC_IDC_LEAD_SERIAL: NORMAL
MDC_IDC_MSMT_BATTERY_DTM: NORMAL
MDC_IDC_MSMT_BATTERY_REMAINING_LONGEVITY: 18 MO
MDC_IDC_MSMT_BATTERY_REMAINING_PERCENTAGE: 25 %
MDC_IDC_MSMT_BATTERY_STATUS: NORMAL
MDC_IDC_MSMT_CAP_CHARGE_DTM: NORMAL
MDC_IDC_MSMT_CAP_CHARGE_DTM: NORMAL
MDC_IDC_MSMT_CAP_CHARGE_ENERGY: 17 J
MDC_IDC_MSMT_CAP_CHARGE_TIME: 12.8 S
MDC_IDC_MSMT_CAP_CHARGE_TIME: 3.1 S
MDC_IDC_MSMT_CAP_CHARGE_TYPE: NORMAL
MDC_IDC_MSMT_CAP_CHARGE_TYPE: NORMAL
MDC_IDC_MSMT_LEADCHNL_RA_IMPEDANCE_VALUE: 551 OHM
MDC_IDC_MSMT_LEADCHNL_RA_PACING_THRESHOLD_AMPLITUDE: 0.9 V
MDC_IDC_MSMT_LEADCHNL_RA_PACING_THRESHOLD_PULSEWIDTH: 0.6 MS
MDC_IDC_MSMT_LEADCHNL_RV_IMPEDANCE_VALUE: 567 OHM
MDC_IDC_MSMT_LEADCHNL_RV_PACING_THRESHOLD_AMPLITUDE: 0.6 V
MDC_IDC_MSMT_LEADCHNL_RV_PACING_THRESHOLD_PULSEWIDTH: 0.4 MS
MDC_IDC_PG_IMPLANT_DTM: NORMAL
MDC_IDC_PG_MFG: NORMAL
MDC_IDC_PG_MODEL: NORMAL
MDC_IDC_PG_SERIAL: NORMAL
MDC_IDC_PG_TYPE: NORMAL
MDC_IDC_SESS_CLINIC_NAME: NORMAL
MDC_IDC_SESS_DTM: NORMAL
MDC_IDC_SESS_TYPE: NORMAL
MDC_IDC_SET_BRADY_AT_MODE_SWITCH_MODE: NORMAL
MDC_IDC_SET_BRADY_AT_MODE_SWITCH_RATE: 170 {BEATS}/MIN
MDC_IDC_SET_BRADY_LOWRATE: 60 {BEATS}/MIN
MDC_IDC_SET_BRADY_MAX_SENSOR_RATE: 130 {BEATS}/MIN
MDC_IDC_SET_BRADY_MAX_TRACKING_RATE: 130 {BEATS}/MIN
MDC_IDC_SET_BRADY_MODE: NORMAL
MDC_IDC_SET_BRADY_PAV_DELAY_HIGH: 180 MS
MDC_IDC_SET_BRADY_PAV_DELAY_LOW: 260 MS
MDC_IDC_SET_BRADY_SAV_DELAY_HIGH: 160 MS
MDC_IDC_SET_BRADY_SAV_DELAY_LOW: 230 MS
MDC_IDC_SET_LEADCHNL_RA_PACING_AMPLITUDE: 1.8 V
MDC_IDC_SET_LEADCHNL_RA_PACING_POLARITY: NORMAL
MDC_IDC_SET_LEADCHNL_RA_PACING_PULSEWIDTH: 0.6 MS
MDC_IDC_SET_LEADCHNL_RA_SENSING_ADAPTATION_MODE: NORMAL
MDC_IDC_SET_LEADCHNL_RA_SENSING_POLARITY: NORMAL
MDC_IDC_SET_LEADCHNL_RA_SENSING_SENSITIVITY: 0.8 MV
MDC_IDC_SET_LEADCHNL_RV_PACING_AMPLITUDE: 1.5 V
MDC_IDC_SET_LEADCHNL_RV_PACING_POLARITY: NORMAL
MDC_IDC_SET_LEADCHNL_RV_PACING_PULSEWIDTH: 0.4 MS
MDC_IDC_SET_LEADCHNL_RV_SENSING_ADAPTATION_MODE: NORMAL
MDC_IDC_SET_LEADCHNL_RV_SENSING_POLARITY: NORMAL
MDC_IDC_SET_LEADCHNL_RV_SENSING_SENSITIVITY: 0.6 MV
MDC_IDC_SET_ZONE_DETECTION_INTERVAL: 300 MS
MDC_IDC_SET_ZONE_DETECTION_INTERVAL: 429 MS
MDC_IDC_SET_ZONE_STATUS: NORMAL
MDC_IDC_SET_ZONE_STATUS: NORMAL
MDC_IDC_SET_ZONE_TYPE: NORMAL
MDC_IDC_SET_ZONE_TYPE: NORMAL
MDC_IDC_SET_ZONE_VENDOR_TYPE: NORMAL
MDC_IDC_SET_ZONE_VENDOR_TYPE: NORMAL
MDC_IDC_STAT_AT_BURDEN_PERCENT: 1 %
MDC_IDC_STAT_AT_DTM_END: NORMAL
MDC_IDC_STAT_AT_DTM_START: NORMAL
MDC_IDC_STAT_BRADY_DTM_END: NORMAL
MDC_IDC_STAT_BRADY_DTM_START: NORMAL
MDC_IDC_STAT_BRADY_RA_PERCENT_PACED: 64 %
MDC_IDC_STAT_BRADY_RV_PERCENT_PACED: 0 %
MDC_IDC_STAT_EPISODE_RECENT_COUNT: 0
MDC_IDC_STAT_EPISODE_RECENT_COUNT: 0
MDC_IDC_STAT_EPISODE_RECENT_COUNT: 1
MDC_IDC_STAT_EPISODE_RECENT_COUNT: 203
MDC_IDC_STAT_EPISODE_RECENT_COUNT_DTM_END: NORMAL
MDC_IDC_STAT_EPISODE_RECENT_COUNT_DTM_START: NORMAL
MDC_IDC_STAT_EPISODE_TYPE: NORMAL
MDC_IDC_STAT_EPISODE_VENDOR_TYPE: NORMAL
MDC_IDC_STAT_TACHYTHERAPY_ATP_DELIVERED_RECENT: 0
MDC_IDC_STAT_TACHYTHERAPY_ATP_DELIVERED_TOTAL: 0
MDC_IDC_STAT_TACHYTHERAPY_RECENT_DTM_END: NORMAL
MDC_IDC_STAT_TACHYTHERAPY_RECENT_DTM_START: NORMAL
MDC_IDC_STAT_TACHYTHERAPY_SHOCKS_ABORTED_RECENT: 0
MDC_IDC_STAT_TACHYTHERAPY_SHOCKS_ABORTED_TOTAL: 0
MDC_IDC_STAT_TACHYTHERAPY_SHOCKS_DELIVERED_RECENT: 0
MDC_IDC_STAT_TACHYTHERAPY_SHOCKS_DELIVERED_TOTAL: 1
MDC_IDC_STAT_TACHYTHERAPY_TOTAL_DTM_END: NORMAL
MDC_IDC_STAT_TACHYTHERAPY_TOTAL_DTM_START: NORMAL

## 2024-03-30 PROCEDURE — 93283 PRGRMG EVAL IMPLANTABLE DFB: CPT | Performed by: INTERNAL MEDICINE

## 2024-05-29 DIAGNOSIS — I47.29 NSVT (NONSUSTAINED VENTRICULAR TACHYCARDIA) (H): ICD-10-CM

## 2024-05-31 RX ORDER — LANOLIN ALCOHOL/MO/W.PET/CERES
400 CREAM (GRAM) TOPICAL DAILY
Qty: 90 TABLET | Refills: 0 | Status: SHIPPED | OUTPATIENT
Start: 2024-05-31 | End: 2024-09-24

## 2024-06-04 ENCOUNTER — LAB REQUISITION (OUTPATIENT)
Dept: LAB | Facility: CLINIC | Age: 89
End: 2024-06-04
Payer: MEDICARE

## 2024-06-04 DIAGNOSIS — E11.22 TYPE 2 DIABETES MELLITUS WITH DIABETIC CHRONIC KIDNEY DISEASE (H): ICD-10-CM

## 2024-06-04 DIAGNOSIS — E78.5 HYPERLIPIDEMIA, UNSPECIFIED: ICD-10-CM

## 2024-06-04 DIAGNOSIS — N18.32 CHRONIC KIDNEY DISEASE, STAGE 3B (H): ICD-10-CM

## 2024-06-04 PROCEDURE — 82570 ASSAY OF URINE CREATININE: CPT | Mod: ORL | Performed by: FAMILY MEDICINE

## 2024-06-04 PROCEDURE — 80061 LIPID PANEL: CPT | Mod: ORL | Performed by: FAMILY MEDICINE

## 2024-06-04 PROCEDURE — 80053 COMPREHEN METABOLIC PANEL: CPT | Mod: ORL | Performed by: FAMILY MEDICINE

## 2024-06-05 LAB
ALBUMIN SERPL BCG-MCNC: 4.2 G/DL (ref 3.5–5.2)
ALP SERPL-CCNC: 91 U/L (ref 40–150)
ALT SERPL W P-5'-P-CCNC: 10 U/L (ref 0–70)
ANION GAP SERPL CALCULATED.3IONS-SCNC: 13 MMOL/L (ref 7–15)
AST SERPL W P-5'-P-CCNC: 21 U/L (ref 0–45)
BILIRUB SERPL-MCNC: 1.1 MG/DL
BUN SERPL-MCNC: 29.7 MG/DL (ref 8–23)
CALCIUM SERPL-MCNC: 9.2 MG/DL (ref 8.8–10.2)
CHLORIDE SERPL-SCNC: 107 MMOL/L (ref 98–107)
CHOLEST SERPL-MCNC: 109 MG/DL
CREAT SERPL-MCNC: 1.29 MG/DL (ref 0.67–1.17)
CREAT UR-MCNC: 180 MG/DL
DEPRECATED HCO3 PLAS-SCNC: 19 MMOL/L (ref 22–29)
EGFRCR SERPLBLD CKD-EPI 2021: 53 ML/MIN/1.73M2
FASTING STATUS PATIENT QL REPORTED: ABNORMAL
FASTING STATUS PATIENT QL REPORTED: ABNORMAL
GLUCOSE SERPL-MCNC: 134 MG/DL (ref 70–99)
HDLC SERPL-MCNC: 28 MG/DL
LDLC SERPL CALC-MCNC: 9 MG/DL
MICROALBUMIN UR-MCNC: 28.2 MG/L
MICROALBUMIN/CREAT UR: 15.67 MG/G CR (ref 0–17)
NONHDLC SERPL-MCNC: 81 MG/DL
POTASSIUM SERPL-SCNC: 4.5 MMOL/L (ref 3.4–5.3)
PROT SERPL-MCNC: 6.6 G/DL (ref 6.4–8.3)
SODIUM SERPL-SCNC: 139 MMOL/L (ref 135–145)
TRIGL SERPL-MCNC: 359 MG/DL

## 2024-06-24 ENCOUNTER — OFFICE VISIT (OUTPATIENT)
Dept: PULMONOLOGY | Facility: CLINIC | Age: 89
End: 2024-06-24
Attending: INTERNAL MEDICINE
Payer: MEDICARE

## 2024-06-24 VITALS
HEART RATE: 60 BPM | SYSTOLIC BLOOD PRESSURE: 128 MMHG | WEIGHT: 175 LBS | OXYGEN SATURATION: 97 % | BODY MASS INDEX: 25.11 KG/M2 | DIASTOLIC BLOOD PRESSURE: 80 MMHG

## 2024-06-24 DIAGNOSIS — R26.81 GAIT INSTABILITY: Primary | ICD-10-CM

## 2024-06-24 DIAGNOSIS — J31.0 CHRONIC RHINITIS: ICD-10-CM

## 2024-06-24 DIAGNOSIS — G47.33 OSA (OBSTRUCTIVE SLEEP APNEA): ICD-10-CM

## 2024-06-24 PROCEDURE — G2211 COMPLEX E/M VISIT ADD ON: HCPCS | Performed by: INTERNAL MEDICINE

## 2024-06-24 PROCEDURE — 99213 OFFICE O/P EST LOW 20 MIN: CPT | Performed by: INTERNAL MEDICINE

## 2024-06-24 NOTE — PATIENT INSTRUCTIONS
Atrovent 2 sprays each nostril twice a day as needed  Miralax 17 g dissolve in 8 oz of water daily as needed  Continue CPAP therapy at night  Will follow PET CT scan results which is going to be done in 2 weeks   DME for 4 wheel walker   Follow up in one year

## 2024-06-25 ENCOUNTER — ANCILLARY PROCEDURE (OUTPATIENT)
Dept: CARDIOLOGY | Facility: CLINIC | Age: 89
End: 2024-06-25
Attending: INTERNAL MEDICINE
Payer: MEDICARE

## 2024-06-25 DIAGNOSIS — Z95.810 ICD (IMPLANTABLE CARDIOVERTER-DEFIBRILLATOR) IN PLACE: ICD-10-CM

## 2024-06-25 DIAGNOSIS — I42.8 NON-ISCHEMIC CARDIOMYOPATHY (H): ICD-10-CM

## 2024-06-26 LAB
MDC_IDC_EPISODE_DTM: NORMAL
MDC_IDC_EPISODE_ID: NORMAL
MDC_IDC_EPISODE_TYPE: NORMAL
MDC_IDC_LEAD_CONNECTION_STATUS: NORMAL
MDC_IDC_LEAD_CONNECTION_STATUS: NORMAL
MDC_IDC_LEAD_IMPLANT_DT: NORMAL
MDC_IDC_LEAD_IMPLANT_DT: NORMAL
MDC_IDC_LEAD_LOCATION: NORMAL
MDC_IDC_LEAD_LOCATION: NORMAL
MDC_IDC_LEAD_LOCATION_DETAIL_1: NORMAL
MDC_IDC_LEAD_LOCATION_DETAIL_1: NORMAL
MDC_IDC_LEAD_MFG: NORMAL
MDC_IDC_LEAD_MFG: NORMAL
MDC_IDC_LEAD_MODEL: NORMAL
MDC_IDC_LEAD_MODEL: NORMAL
MDC_IDC_LEAD_POLARITY_TYPE: NORMAL
MDC_IDC_LEAD_POLARITY_TYPE: NORMAL
MDC_IDC_LEAD_SERIAL: NORMAL
MDC_IDC_LEAD_SERIAL: NORMAL
MDC_IDC_MSMT_BATTERY_DTM: NORMAL
MDC_IDC_MSMT_BATTERY_REMAINING_LONGEVITY: 18 MO
MDC_IDC_MSMT_BATTERY_REMAINING_PERCENTAGE: 23 %
MDC_IDC_MSMT_BATTERY_STATUS: NORMAL
MDC_IDC_MSMT_CAP_CHARGE_DTM: NORMAL
MDC_IDC_MSMT_CAP_CHARGE_DTM: NORMAL
MDC_IDC_MSMT_CAP_CHARGE_ENERGY: 17 J
MDC_IDC_MSMT_CAP_CHARGE_TIME: 13 S
MDC_IDC_MSMT_CAP_CHARGE_TIME: 3.1 S
MDC_IDC_MSMT_CAP_CHARGE_TYPE: NORMAL
MDC_IDC_MSMT_CAP_CHARGE_TYPE: NORMAL
MDC_IDC_MSMT_LEADCHNL_RA_IMPEDANCE_VALUE: 447 OHM
MDC_IDC_MSMT_LEADCHNL_RA_PACING_THRESHOLD_AMPLITUDE: 0.9 V
MDC_IDC_MSMT_LEADCHNL_RA_PACING_THRESHOLD_PULSEWIDTH: 0.6 MS
MDC_IDC_MSMT_LEADCHNL_RV_IMPEDANCE_VALUE: 539 OHM
MDC_IDC_MSMT_LEADCHNL_RV_PACING_THRESHOLD_AMPLITUDE: 0.6 V
MDC_IDC_MSMT_LEADCHNL_RV_PACING_THRESHOLD_PULSEWIDTH: 0.4 MS
MDC_IDC_PG_IMPLANT_DTM: NORMAL
MDC_IDC_PG_MFG: NORMAL
MDC_IDC_PG_MODEL: NORMAL
MDC_IDC_PG_SERIAL: NORMAL
MDC_IDC_PG_TYPE: NORMAL
MDC_IDC_SESS_CLINIC_NAME: NORMAL
MDC_IDC_SESS_DTM: NORMAL
MDC_IDC_SESS_TYPE: NORMAL
MDC_IDC_SET_BRADY_AT_MODE_SWITCH_MODE: NORMAL
MDC_IDC_SET_BRADY_AT_MODE_SWITCH_RATE: 170 {BEATS}/MIN
MDC_IDC_SET_BRADY_LOWRATE: 60 {BEATS}/MIN
MDC_IDC_SET_BRADY_MAX_SENSOR_RATE: 130 {BEATS}/MIN
MDC_IDC_SET_BRADY_MAX_TRACKING_RATE: 130 {BEATS}/MIN
MDC_IDC_SET_BRADY_MODE: NORMAL
MDC_IDC_SET_BRADY_PAV_DELAY_HIGH: 180 MS
MDC_IDC_SET_BRADY_PAV_DELAY_LOW: 260 MS
MDC_IDC_SET_BRADY_SAV_DELAY_HIGH: 160 MS
MDC_IDC_SET_BRADY_SAV_DELAY_LOW: 230 MS
MDC_IDC_SET_LEADCHNL_RA_PACING_AMPLITUDE: 1.8 V
MDC_IDC_SET_LEADCHNL_RA_PACING_POLARITY: NORMAL
MDC_IDC_SET_LEADCHNL_RA_PACING_PULSEWIDTH: 0.6 MS
MDC_IDC_SET_LEADCHNL_RA_SENSING_ADAPTATION_MODE: NORMAL
MDC_IDC_SET_LEADCHNL_RA_SENSING_POLARITY: NORMAL
MDC_IDC_SET_LEADCHNL_RA_SENSING_SENSITIVITY: 0.8 MV
MDC_IDC_SET_LEADCHNL_RV_PACING_AMPLITUDE: 1.5 V
MDC_IDC_SET_LEADCHNL_RV_PACING_POLARITY: NORMAL
MDC_IDC_SET_LEADCHNL_RV_PACING_PULSEWIDTH: 0.4 MS
MDC_IDC_SET_LEADCHNL_RV_SENSING_ADAPTATION_MODE: NORMAL
MDC_IDC_SET_LEADCHNL_RV_SENSING_POLARITY: NORMAL
MDC_IDC_SET_LEADCHNL_RV_SENSING_SENSITIVITY: 0.6 MV
MDC_IDC_SET_ZONE_DETECTION_INTERVAL: 300 MS
MDC_IDC_SET_ZONE_DETECTION_INTERVAL: 429 MS
MDC_IDC_SET_ZONE_STATUS: NORMAL
MDC_IDC_SET_ZONE_STATUS: NORMAL
MDC_IDC_SET_ZONE_TYPE: NORMAL
MDC_IDC_SET_ZONE_TYPE: NORMAL
MDC_IDC_SET_ZONE_VENDOR_TYPE: NORMAL
MDC_IDC_SET_ZONE_VENDOR_TYPE: NORMAL
MDC_IDC_STAT_BRADY_DTM_END: NORMAL
MDC_IDC_STAT_BRADY_DTM_START: NORMAL
MDC_IDC_STAT_BRADY_RA_PERCENT_PACED: 68 %
MDC_IDC_STAT_BRADY_RV_PERCENT_PACED: 0 %
MDC_IDC_STAT_EPISODE_RECENT_COUNT: 0
MDC_IDC_STAT_EPISODE_RECENT_COUNT: 0
MDC_IDC_STAT_EPISODE_RECENT_COUNT: 1
MDC_IDC_STAT_EPISODE_RECENT_COUNT: 44
MDC_IDC_STAT_EPISODE_RECENT_COUNT_DTM_END: NORMAL
MDC_IDC_STAT_EPISODE_RECENT_COUNT_DTM_START: NORMAL
MDC_IDC_STAT_EPISODE_TYPE: NORMAL
MDC_IDC_STAT_EPISODE_VENDOR_TYPE: NORMAL
MDC_IDC_STAT_TACHYTHERAPY_ATP_DELIVERED_RECENT: 0
MDC_IDC_STAT_TACHYTHERAPY_ATP_DELIVERED_TOTAL: 0
MDC_IDC_STAT_TACHYTHERAPY_RECENT_DTM_END: NORMAL
MDC_IDC_STAT_TACHYTHERAPY_RECENT_DTM_START: NORMAL
MDC_IDC_STAT_TACHYTHERAPY_SHOCKS_ABORTED_RECENT: 0
MDC_IDC_STAT_TACHYTHERAPY_SHOCKS_ABORTED_TOTAL: 0
MDC_IDC_STAT_TACHYTHERAPY_SHOCKS_DELIVERED_RECENT: 0
MDC_IDC_STAT_TACHYTHERAPY_SHOCKS_DELIVERED_TOTAL: 1
MDC_IDC_STAT_TACHYTHERAPY_TOTAL_DTM_END: NORMAL

## 2024-06-26 PROCEDURE — 93296 REM INTERROG EVL PM/IDS: CPT | Performed by: INTERNAL MEDICINE

## 2024-06-26 PROCEDURE — 93295 DEV INTERROG REMOTE 1/2/MLT: CPT | Performed by: INTERNAL MEDICINE

## 2024-06-28 NOTE — PROGRESS NOTES
"   PULMONARY OUTPATIENT PROGRESS NOTE        Assessment:      Resolved RLL nodule and mediastinal adenopathy   PET CT 8/2023 showed increase uptake RLL nodule and mediastinal adenopathy.   Though to be inflammatory.   Follow up chest CT scan 12/18/23 showed resolution of RLL nodule and mediastinal adenopathy   Recurrent low grade follicular lymphoma   Diagnosed on 2014, recurrent disease in the pelvis area on 2017 treated with rituxan  New gluteal lesion diagnosed on 9/2023. US guided biopsy (+) low grade follicular lymphoma  Oncology service is following , plan for follow up PET CT scan in 2 weeks.   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   Chronic rhinitis   Symptoms have improved. No using atrovent.   SIRIA on CPAP  Overflow diarrhea / constipation   Miralax daily   Non ischemic cardiomyopathy, s/p dual chamber ICD  Unsteady on his feet  \"Patient has mobility limitation that impairs to do his activities of daily living. His functional limitations can be sufficiently resolved with use of a walker\".   DME order for 4 wheel walker was placed.      Plan:      Atrovent 2 sprays each nostril twice a day as needed  Miralax 17 g dissolve in 8 oz of water daily as needed  Continue CPAP therapy at night  Will follow PET CT scan results which is going to be done in 2 weeks   DME for 4 wheel walker   Follow up in one year      Gurdeep Garcia  Pulmonary / Critical Care  6/24/2024       CC:     Chief Complaint   Patient presents with    Follow Up      HPI:         Anthony Tiwari is a 88 year old male who presents for follow up appointment.  Patient has history of HTN, non ischemic cardiomyopathy EF 30-40%, s/p ICD, DM, SIRIA on CPAP, CKD , large cell lymphoma of the groin s/p CHOP and rituxan, recurrent low grade follicular lymphoma 2014, recurrent disease pelvic area 2017, treated with rituxan, skin B cell lymphoma involving the " [facility] 2017 s/p rituxan.     Reports doing well, mild exertional dyspnea, denies cough or wheezes.  Denies using inhalers. Stop using nasal spray.   Activity is limited due to feeling unsteady on his feet. Patient would like to have a walker.   Denies chest pain, orthopnea, PND, or swelling of LEs.  Reports constipation and episodes of diarrhea, did not start miralax as previously instructed.   Denies nausea, vomiting or abdominal pain.   Denies fever, chills or night sweats.   Diagnosed with sleep apnea, uses CPAP every night.   No tobacco use in the past.      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 Cody at [facility], getting chemo again in 2017    Nonocclusive coronary atherosclerosis of native coronary artery     nonobstuctive by cor angio      SIRIA on CPAP 2013    he sees someone at [facility]    Other primary cardiomyopathies 2013    EF was as low as 23%    Statin intolerance 02/21/2019    history of elevated CPK on both simvastatin but less so on atorvastatin.    Type 2 diabetes mellitus (H)       Medications:     Current Outpatient Medications   Medication Sig Dispense Refill    aspirin 81 MG EC tablet [ASPIRIN 81 MG EC TABLET] Take 81 mg by mouth bedtime.      benzonatate (TESSALON) 100 MG capsule Take 1 capsule (100 mg) by mouth 2 times daily as needed for cough 10 capsule 0    carbidopa-levodopa (SINEMET)  MG tablet Take 100 tablets by mouth 4 times daily      CONTOUR NEXT TEST test strip USE TO TEST BLOOD SUGAR DAILY      insulin NPH-insulin regular (NOVOLIN 70-30) 100 unit/mL  (70-30) injection Inject 10 Units Subcutaneous daily (with dinner)      lisinopril (PRINIVIL,ZESTRIL) 2.5 MG tablet [LISINOPRIL (PRINIVIL,ZESTRIL) 2.5 MG TABLET] Take 2.5 mg by mouth bedtime.       magnesium oxide 400 MG tablet TAKE 1 TABLET EVERY DAY 90 tablet 0    metFORMIN (GLUCOPHAGE) 500 MG tablet Take 1,000 mg by mouth 2 times daily (with meals) Take 2 pills in the A.M. and 2 pills at P.m.  2    metoprolol succinate ER (TOPROL XL) 100 MG 24 hr tablet TAKE 1 TABLET AT BEDTIME 90 tablet 1    atorvastatin (LIPITOR) 20 MG tablet [ATORVASTATIN (LIPITOR) 20 MG TABLET] Take 20 mg by mouth at bedtime. (Patient not taking: Reported on 6/24/2024)      ipratropium (ATROVENT) 0.06 % nasal spray Spray 2 sprays into both nostrils 2 times daily (Patient not taking: Reported on 6/24/2024) 15 mL 12    polyethylene glycol (MIRALAX) 17 GM/Dose powder Take 17 g by mouth daily Dissolve in 8 oz of water (Patient not taking: Reported on 6/24/2024) 510 g 12     No current facility-administered medications for this visit.      Social History :     Social History     Socioeconomic History    Marital status:      Spouse name: Not on file    Number of children: 3    Years of education: Not on file    Highest education level: Not on file   Occupational History    Not on file   Tobacco Use    Smoking status: Never    Smokeless tobacco: Never   Vaping Use    Vaping status: Never Used   Substance and Sexual Activity    Alcohol use: No    Drug use: No    Sexual activity: Not on file   Other Topics Concern    Not on file   Social History Narrative    He used to walk for an hour most days and still takes care of his lawn and snow removal, though his grandsons are helping more with the lawn in 2018. 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 He is walking less in 2019. His wife,  Rebeka, is 6 years his tu and is providing daytime and overnight  for their daughter Loree's fraternal twins, Estrella and  Jovanny, since 2019. Anthony spends 5 hours a day with Loree and the twins.     Social Determinants of Health     Financial Resource Strain: High Risk (1/1/2022)    [facility]    Financial Resource Strain     Difficulty of Paying Living Expenses: Not on file     Difficulty of Paying Living Expenses: Not on file   Food Insecurity: Not on file   Transportation Needs: Not on file   Physical Activity: Not on file   Stress: Not on file   Social Connections: Unknown (1/1/2022)    [facility]    Social Connections     Frequency of Communication with Friends and Family: Not on file   Interpersonal Safety: Not on file   Housing Stability: Not on file          Family History :     Family History   Problem Relation Age of Onset    Rheumatic Heart Disease Brother         half brother    Dementia Brother     Cerebrovascular Disease Father     Rheumatic Heart Disease Sister     Sudden Death Sister 67.00        no autopsy    CABG Sister 81.00    No Known Problems Daughter     No Known Problems Daughter     No Known Problems Daughter     No Known Problems Granddaughter     No Known Problems Grandson     Urolithiasis No family hx of     Clotting Disorder No family hx of     Gout No family hx of        Review of Systems  A 12 point comprehensive review of systems was negative except as noted.     Objective:     /80 (BP Location: Left arm, Patient Position: Chair, Cuff Size: Adult Regular)   Pulse 60   Wt 79.4 kg (175 lb)   SpO2 97%   BMI 25.11 kg/m      Gen: awake, alert, no distress  HEENT: pink conjunctiva, moist mucosa, Mallampati II/IV  Neck: no thyromegaly, masses or JVD  Lungs: clear  CV: regular, no murmurs or gallops appreciated  Abdomen: soft, NT, BS wnl  Ext: no edema  Neuro: CN II-XII intact, non focal      Diagnostic tests:         PATHOLOGY: (9/20/2023)     A(1). Buttock, Left, :  - Low-grade (grade 1-2 of 3) follicular lymphoma  - Pending flow cytometry ([facility])  - Pending FISH studies ([facility])    IMAGES:     CT CHEST ABDOMEN PELVIS WO ORAL WO IV CONTRAST  DATE/TIME: 6/14/2021 9:35 AM  INDICATION: Follicular lymphoma grade I, lymph nodes of inguinal region and lower limb.  COMPARISON: CT exams 8/12/2020, 11/12/2019, 5/10/2019 and 5/6/2016.  FINDINGS:   LUNGS AND PLEURA: Benign stable 3 mm subpleural nodule right major fissure. Lungs otherwise clear. No pleural effusion.  MEDIASTINUM/AXILLAE: No lymphadenopathy. AICD anterior left chest wall with lead tips in the RA and RV.  CORONARY ARTERY CALCIFICATION: Moderate.  HEPATOBILIARY: Diminutive adherent calcified gallstone versus calcified gallbladder polyp in the otherwise normal-appearing gallbladder. No bile duct dilatation. Mild diffuse hepatic steatosis. Liver otherwise normal.  PANCREAS: Normal.  SPLEEN: Normal.  ADRENAL GLANDS: Normal.  KIDNEYS/BLADDER: Three bladder calculi (16 mm, 10 mm and 10 mm) and a left-sided bladder diverticulum. Benign stable renal cysts require no follow-up.  BOWEL: Normal.  LYMPH NODES: No lymphadenopathy.  VASCULATURE: No abdominal aortic aneurysm.  PELVIC ORGANS: Normal.  MUSCULOSKELETAL: Right hip arthroplasty. No bone lesions.  IMPRESSION:  1.  No lymphadenopathy. Spleen size normal.  2.  Three bladder calculi (previously 2) and a left-sided bladder diverticulum.  3.  Diminutive adherent calcified gallstone versus calcified gallbladder polyp in the otherwise normal-appearing gallbladder.     Chest CT scan 8/16/2023  RLL nodule 1.2 cm located in right paravertebral area.  No abnormal lymphadenopathy     PET CT scan 8/29/2023  Mild avid uptake of the ground glass RLL nodule (SUV 2.6), associated mild uptake 10R lymph node (SUV 4.0) Subcutaneous tissue nodule left gluteal area (SUV 10).     CT CHEST W/O CONTRAST  DATE: 12/18/2023  INDICATION: follow up lung  nodule  COMPARISON: CT 08/16/2023, 08/12/2022.  FINDINGS:   LUNGS AND PLEURA: The right lower lobe lung nodule has resolved. No effusion. No new nodule.  MEDIASTINUM/AXILLAE: No lymphadenopathy. No thoracic aortic aneurysm.  CORONARY ARTERY CALCIFICATION: Mild.  UPPER ABDOMEN: Tiny gallbladder hyperdensity suggests a small stone. Left upper renal 1 mm nonobstructive stone. Stable 5 mm left posterior renal exophytic nodule is most likely a hyperdense cyst. Stable PET negative left renal 5.9 cm fluid density lesion with some intrinsic linear calcification.  MUSCULOSKELETAL: Thoracic DISH.  IMPRESSION:   1.  Resolution right lower lobe nodule.  2.  No evidence of malignanct recurrence.  3.  Possible small gallstone.  4.  Left renal 1 mm nonobstructive stone.

## 2024-07-01 ENCOUNTER — TELEPHONE (OUTPATIENT)
Dept: PULMONOLOGY | Facility: CLINIC | Age: 89
End: 2024-07-01
Payer: MEDICARE

## 2024-07-01 NOTE — TELEPHONE ENCOUNTER
DATE: 07/01/2024  DME PROVIDER: [facility] ORDERED:walker with seat   PROVIDER: Josias    Faxed order, last note and facesheet.    Rachele Farooq LPN

## 2024-09-12 ENCOUNTER — APPOINTMENT (OUTPATIENT)
Dept: RADIOLOGY | Facility: CLINIC | Age: 89
End: 2024-09-12
Attending: STUDENT IN AN ORGANIZED HEALTH CARE EDUCATION/TRAINING PROGRAM
Payer: MEDICARE

## 2024-09-12 ENCOUNTER — HOSPITAL ENCOUNTER (EMERGENCY)
Facility: CLINIC | Age: 89
Discharge: HOME | End: 2024-09-12
Attending: STUDENT IN AN ORGANIZED HEALTH CARE EDUCATION/TRAINING PROGRAM | Admitting: STUDENT IN AN ORGANIZED HEALTH CARE EDUCATION/TRAINING PROGRAM
Payer: MEDICARE

## 2024-09-12 VITALS
HEART RATE: 71 BPM | HEIGHT: 70 IN | RESPIRATION RATE: 24 BRPM | WEIGHT: 174 LBS | DIASTOLIC BLOOD PRESSURE: 57 MMHG | TEMPERATURE: 98.3 F | SYSTOLIC BLOOD PRESSURE: 131 MMHG | BODY MASS INDEX: 24.91 KG/M2 | OXYGEN SATURATION: 97 %

## 2024-09-12 DIAGNOSIS — J18.9 COMMUNITY ACQUIRED PNEUMONIA OF LEFT LOWER LOBE OF LUNG: ICD-10-CM

## 2024-09-12 DIAGNOSIS — R53.83 FATIGUE, UNSPECIFIED TYPE: ICD-10-CM

## 2024-09-12 LAB
ALBUMIN UR-MCNC: 20 MG/DL
ANION GAP SERPL CALCULATED.3IONS-SCNC: 11 MMOL/L (ref 7–15)
APPEARANCE UR: CLEAR
ATRIAL RATE - MUSE: 67 BPM
BILIRUB UR QL STRIP: NEGATIVE
BUN SERPL-MCNC: 31.7 MG/DL (ref 8–23)
CALCIUM SERPL-MCNC: 8.4 MG/DL (ref 8.8–10.4)
CHLORIDE SERPL-SCNC: 103 MMOL/L (ref 98–107)
COLOR UR AUTO: YELLOW
CREAT SERPL-MCNC: 1.4 MG/DL (ref 0.67–1.17)
DIASTOLIC BLOOD PRESSURE - MUSE: NORMAL MMHG
EGFRCR SERPLBLD CKD-EPI 2021: 48 ML/MIN/1.73M2
ERYTHROCYTE [DISTWIDTH] IN BLOOD BY AUTOMATED COUNT: 13 % (ref 10–15)
GLUCOSE BLDC GLUCOMTR-MCNC: 211 MG/DL (ref 70–99)
GLUCOSE SERPL-MCNC: 163 MG/DL (ref 70–99)
GLUCOSE UR STRIP-MCNC: NEGATIVE MG/DL
HCO3 SERPL-SCNC: 22 MMOL/L (ref 22–29)
HCT VFR BLD AUTO: 38.3 % (ref 40–53)
HGB BLD-MCNC: 13 G/DL (ref 13.3–17.7)
HGB UR QL STRIP: NEGATIVE
INTERPRETATION ECG - MUSE: NORMAL
KETONES UR STRIP-MCNC: NEGATIVE MG/DL
LEUKOCYTE ESTERASE UR QL STRIP: ABNORMAL
MCH RBC QN AUTO: 30.7 PG (ref 26.5–33)
MCHC RBC AUTO-ENTMCNC: 33.9 G/DL (ref 31.5–36.5)
MCV RBC AUTO: 91 FL (ref 78–100)
MUCOUS THREADS #/AREA URNS LPF: PRESENT /LPF
NITRATE UR QL: NEGATIVE
P AXIS - MUSE: 73 DEGREES
PH UR STRIP: 5.5 [PH] (ref 5–7)
PLATELET # BLD AUTO: 208 10E3/UL (ref 150–450)
POTASSIUM SERPL-SCNC: 5.2 MMOL/L (ref 3.4–5.3)
PR INTERVAL - MUSE: 176 MS
QRS DURATION - MUSE: 128 MS
QT - MUSE: 408 MS
QTC - MUSE: 431 MS
R AXIS - MUSE: -42 DEGREES
RBC # BLD AUTO: 4.23 10E6/UL (ref 4.4–5.9)
RBC URINE: 2 /HPF
SARS-COV-2 RNA RESP QL NAA+PROBE: NEGATIVE
SODIUM SERPL-SCNC: 136 MMOL/L (ref 135–145)
SP GR UR STRIP: 1.02 (ref 1–1.03)
SQUAMOUS EPITHELIAL: 1 /HPF
SYSTOLIC BLOOD PRESSURE - MUSE: NORMAL MMHG
T AXIS - MUSE: 106 DEGREES
UROBILINOGEN UR STRIP-MCNC: <2 MG/DL
VENTRICULAR RATE- MUSE: 67 BPM
WBC # BLD AUTO: 9.6 10E3/UL (ref 4–11)
WBC URINE: 36 /HPF

## 2024-09-12 PROCEDURE — 87086 URINE CULTURE/COLONY COUNT: CPT | Performed by: STUDENT IN AN ORGANIZED HEALTH CARE EDUCATION/TRAINING PROGRAM

## 2024-09-12 PROCEDURE — 36415 COLL VENOUS BLD VENIPUNCTURE: CPT | Performed by: STUDENT IN AN ORGANIZED HEALTH CARE EDUCATION/TRAINING PROGRAM

## 2024-09-12 PROCEDURE — 82962 GLUCOSE BLOOD TEST: CPT

## 2024-09-12 PROCEDURE — 87635 SARS-COV-2 COVID-19 AMP PRB: CPT | Performed by: STUDENT IN AN ORGANIZED HEALTH CARE EDUCATION/TRAINING PROGRAM

## 2024-09-12 PROCEDURE — 99285 EMERGENCY DEPT VISIT HI MDM: CPT | Mod: 25

## 2024-09-12 PROCEDURE — 85027 COMPLETE CBC AUTOMATED: CPT | Performed by: STUDENT IN AN ORGANIZED HEALTH CARE EDUCATION/TRAINING PROGRAM

## 2024-09-12 PROCEDURE — 81001 URINALYSIS AUTO W/SCOPE: CPT | Performed by: STUDENT IN AN ORGANIZED HEALTH CARE EDUCATION/TRAINING PROGRAM

## 2024-09-12 PROCEDURE — 71046 X-RAY EXAM CHEST 2 VIEWS: CPT

## 2024-09-12 PROCEDURE — 93005 ELECTROCARDIOGRAM TRACING: CPT | Performed by: STUDENT IN AN ORGANIZED HEALTH CARE EDUCATION/TRAINING PROGRAM

## 2024-09-12 PROCEDURE — 80048 BASIC METABOLIC PNL TOTAL CA: CPT | Performed by: STUDENT IN AN ORGANIZED HEALTH CARE EDUCATION/TRAINING PROGRAM

## 2024-09-12 PROCEDURE — 250N000013 HC RX MED GY IP 250 OP 250 PS 637: Performed by: STUDENT IN AN ORGANIZED HEALTH CARE EDUCATION/TRAINING PROGRAM

## 2024-09-12 RX ORDER — AZITHROMYCIN 500 MG/1
500 TABLET, FILM COATED ORAL ONCE
Status: COMPLETED | OUTPATIENT
Start: 2024-09-12 | End: 2024-09-12

## 2024-09-12 RX ORDER — CARBIDOPA AND LEVODOPA 25; 100 MG/1; MG/1
1 TABLET ORAL 2 TIMES DAILY
COMMUNITY

## 2024-09-12 RX ORDER — INSULIN GLARGINE 100 [IU]/ML
10 INJECTION, SOLUTION SUBCUTANEOUS AT BEDTIME
COMMUNITY
Start: 2024-09-10

## 2024-09-12 RX ORDER — AZITHROMYCIN 250 MG/1
250 TABLET, FILM COATED ORAL DAILY
Qty: 4 TABLET | Refills: 0 | Status: SHIPPED | OUTPATIENT
Start: 2024-09-12 | End: 2024-09-16

## 2024-09-12 RX ORDER — ATORVASTATIN CALCIUM 10 MG/1
10 TABLET, FILM COATED ORAL AT BEDTIME
COMMUNITY
Start: 2024-08-16

## 2024-09-12 RX ADMIN — AZITHROMYCIN DIHYDRATE 500 MG: 500 TABLET, FILM COATED ORAL at 17:47

## 2024-09-12 RX ADMIN — AMOXICILLIN AND CLAVULANATE POTASSIUM 1 TABLET: 875; 125 TABLET, FILM COATED ORAL at 17:47

## 2024-09-12 ASSESSMENT — COLUMBIA-SUICIDE SEVERITY RATING SCALE - C-SSRS
6. HAVE YOU EVER DONE ANYTHING, STARTED TO DO ANYTHING, OR PREPARED TO DO ANYTHING TO END YOUR LIFE?: NO
2. HAVE YOU ACTUALLY HAD ANY THOUGHTS OF KILLING YOURSELF IN THE PAST MONTH?: NO
1. IN THE PAST MONTH, HAVE YOU WISHED YOU WERE DEAD OR WISHED YOU COULD GO TO SLEEP AND NOT WAKE UP?: NO

## 2024-09-12 ASSESSMENT — ACTIVITIES OF DAILY LIVING (ADL)
ADLS_ACUITY_SCORE: 33
ADLS_ACUITY_SCORE: 35

## 2024-09-12 NOTE — ED TRIAGE NOTES
Started new insulin 2 days ago.  Increased general weakness. Has not taken Blood glucose today. A little SOB today.

## 2024-09-12 NOTE — ED PROVIDER NOTES
EMERGENCY DEPARTMENT ENCOUNTER       ED Course & Medical Decision Making     2:40 PM I met with the patient to gather history and to perform my initial exam. We discussed plans for the ED course, including diagnostic testing   and treatment.    5:48 PM Rechecked and updated patient. I discussed plans for discharge with the patient, which they were agreeable to. We discussed supportive cares at home and reasons for return to the ER including new or worsening symptoms. All questions and concerns were addressed. Patient to be discharged by RN.     Final Impression  88 year old male presents for evaluation of 2 days of generalized weakness.  Daughter present with him providing additional history.  States that over the last 2 days he has felt more weak and fatigued, perhaps slightly more confused than usual.  No recent falls, trauma, or other head injuries.  Did go to the doctor on Monday and they changed his insulin dosing from 25 units of 0730 every morning to 10 units of Lantus nightly.  States he has been taking this as prescribed.  Glucose 211 upon arrival.  Denies any dysuria, flank pain, abdominal pain, shortness of breath, though does report a recent cough over the last 3 days or so.  Daughter reports that one of her children recently had some recent viral URI symptoms, may have had some indirect exposure/sick contact.    Labs fairly reassuring, urinalysis does not show significant bacteria.  Chest x-ray however does show a subtle left lower lobe opacity which could be convincing for pneumonia given patient's presenting symptoms and cough over the last few days. Will treat as community-acquired pneumonia, first dose was given in the ED findings and plan discussed with patient and daughter at bedside.    Prior to making a final disposition on this patient the results of patient's tests and other diagnostic studies were discussed with the patient. All questions were answered. Patient expressed understanding of the  plan and was amenable to it.    Medical Decision Making    History:  Supplemental history from: Daughter  External Record(s) reviewed as documented below;  10/13/24, [facility] note, seen by Dr. Reyez for follow-up of nonischemic cardiomyopathy, LVEF 30 to 40%, dual-chamber ICD.  Note reviewed.     Work Up:  Chart documentation includes differential considered and any EKGs or imaging independently interpreted by provider, where specified.  DDx considered but not limited to: COVID, pneumonia, electrolyte derangements, anemia, UTI  Considered administering antibiotics for: Possible UTI or pneumonia, however UA and CXR negative  Considered admission / inpatient observation / escalation of cares for: Pneumonia, however patient not hypoxic, clinically fairly well-appearing, will likely do well with outpatient trial of antibiotics    Complicating factors:  Care impacted by chronic illness: Lymphoma, chronic kidney disease, hyperlipidemia, hypertension, type 2 diabetes, heart disease  Care affected by social determinants of health: N/A    Disposition considerations: Discharge. I prescribed additional prescription strength medication(s) as charted. I considered admission, but discharged patient after significant clinical improvement.    Not Applicable    Medications   amoxicillin-clavulanate (AUGMENTIN) 875-125 MG per tablet 1 tablet (1 tablet Oral $Given 9/12/24 1747)   azithromycin (ZITHROMAX) tablet 500 mg (500 mg Oral $Given 9/12/24 1747)       Discharge Medication List as of 9/12/2024  6:04 PM        START taking these medications    Details   amoxicillin-clavulanate (AUGMENTIN) 875-125 MG tablet Take 1 tablet by mouth 2 times daily for 7 days., Disp-14 tablet, R-0, E-Prescribe      azithromycin (ZITHROMAX Z-ALEX) 250 MG tablet Take 1 tablet (250 mg) by mouth daily for 4 days., Disp-4 tablet, R-0, E-Prescribe           Discharge Medication List as of 9/12/2024  6:04 PM          Final Impression     1.  "Fatigue, unspecified type    2. Community acquired pneumonia of left lower lobe of lung      Chief Complaint     Chief Complaint   Patient presents with    Fatigue     Started new insulin 2 days ago.  Increased general weakness. Has not taken Blood glucose today. A little SOB today.    HPI     Anthony Tiwari is a 88 year old male who presents for evaluation of generalized weakness.     Patient states that earlier today he was sitting on a deck on a porch and reports \"I didn't want to move or get up.\" He endorses having generalized weakness for approximately 2-3 days. He states he \"feels better\" within the ED, but he additionally endorses \"feeling uncomfortable,\" within the ED. He endorses a PMH of Parkinson's disease and notes he regularly has difficulty pushing himself up from a sitting position. He also notes \"I am always short on water.\" He endorses currently living with his wife. He endorses recently having a dosage of his medication changed, and he notes he believes he is having a reaction to this medication change. He denies any recent room spinning dizziness, or any other complications at this time.     Patient's daughter notes the patient regularly has difficulty verbalizing his symptoms, and she reports that earlier today they called a triage line regarding the patient's symptoms who prompted the patient to be evaluated within the ED. She states that today the patient reported to her that he has not been feeling great over the past 2-3 days, and she also endorses the patient appearing weaker than normal today, as well as the patient appearing to have less energy compared to baseline today, the patient appearing to be \"more out of it\" today, and the patient appearing more \"foggy\" today. She notes that she believes the patient's symptoms might be due to the patient newly having his dosage of insulin reduced from a 25 unit morning dose down to an evening 10 unit dose of on 09/09/24 (3 days ago). She states " "that the only thing the patient ate today was ice cream. She denies any other patient complications at this time.     I, Shun Kassie, am serving as a scribe to document services personally performed by Dr. Paresh Rowe MD, based on my observation and the provider's statements to me. I, Dr. Paresh Rowe MD attest that Shun Fernando is acting in a scribe capacity, has observed my performance of the services and has documented them in accordance with my direction.    Physical Exam     /57   Pulse 71   Temp 98.3  F (36.8  C) (Oral)   Resp 24   Ht 1.778 m (5' 10\")   Wt 78.9 kg (174 lb)   SpO2 97%   BMI 24.97 kg/m    Constitutional: Awake, alert, in no acute distress.  Head: Normocephalic, atraumatic.  ENT: Mucous membranes moist.  Eyes: Conjunctiva normal.  Respiratory: Respirations even, unlabored, in no acute respiratory distress.  Lungs clear to auscultation bilaterally, no appreciable coarseness or wheezing  Cardiovascular: Regular rate and rhythm. Good peripheral perfusion.  GI: Abdomen soft, non-tender.  Nondistended, no guarding or rebound. No CVA tenderness  Musculoskeletal: Moves all 4 extremities equally.  Integument: Warm, dry.  Neurologic: Alert & oriented x 3. Normal speech. Grossly normal motor and sensory function. No focal deficits noted.  Psychiatric: Normal mood    Labs & Imaging     Imaging reviewed and independently interpreted as below;   CXR images reviewed, does have very subtle left lower lobe opacity.    Results for orders placed or performed during the hospital encounter of 09/12/24   Chest XR,  PA & LAT    Impression    IMPRESSION: There is subtle left lower lobe airspace opacity which could be atelectasis, is concerning for pneumonia/pneumonitis in the appropriate clinical setting. Remaining lung fields are clear. Dual-lead left changes cardiac implantable device in   expected position. Cardiac and mediastinal silhouettes within normal limits. Aortic " calcifications present. Degenerative change in the spine and shoulders.   Glucose by meter   Result Value Ref Range    GLUCOSE BY METER POCT 211 (H) 70 - 99 mg/dL   CBC (+ platelets, no diff)   Result Value Ref Range    WBC Count 9.6 4.0 - 11.0 10e3/uL    RBC Count 4.23 (L) 4.40 - 5.90 10e6/uL    Hemoglobin 13.0 (L) 13.3 - 17.7 g/dL    Hematocrit 38.3 (L) 40.0 - 53.0 %    MCV 91 78 - 100 fL    MCH 30.7 26.5 - 33.0 pg    MCHC 33.9 31.5 - 36.5 g/dL    RDW 13.0 10.0 - 15.0 %    Platelet Count 208 150 - 450 10e3/uL   Basic metabolic panel   Result Value Ref Range    Sodium 136 135 - 145 mmol/L    Potassium 5.2 3.4 - 5.3 mmol/L    Chloride 103 98 - 107 mmol/L    Carbon Dioxide (CO2) 22 22 - 29 mmol/L    Anion Gap 11 7 - 15 mmol/L    Urea Nitrogen 31.7 (H) 8.0 - 23.0 mg/dL    Creatinine 1.40 (H) 0.67 - 1.17 mg/dL    GFR Estimate 48 (L) >60 mL/min/1.73m2    Calcium 8.4 (L) 8.8 - 10.4 mg/dL    Glucose 163 (H) 70 - 99 mg/dL   UA with Microscopic reflex to Culture    Specimen: Urine, Midstream   Result Value Ref Range    Color Urine Yellow Colorless, Straw, Light Yellow, Yellow    Appearance Urine Clear Clear    Glucose Urine Negative Negative mg/dL    Bilirubin Urine Negative Negative    Ketones Urine Negative Negative mg/dL    Specific Gravity Urine 1.022 1.001 - 1.030    Blood Urine Negative Negative    pH Urine 5.5 5.0 - 7.0    Protein Albumin Urine 20 (A) Negative mg/dL    Urobilinogen Urine <2.0 <2.0 mg/dL    Nitrite Urine Negative Negative    Leukocyte Esterase Urine 500 Cathy/uL (A) Negative    Mucus Urine Present (A) None Seen /LPF    RBC Urine 2 <=2 /HPF    WBC Urine 36 (H) <=5 /HPF    Squamous Epithelials Urine 1 <=1 /HPF   Asymptomatic COVID-19 Virus (Coronavirus) by PCR Nasopharyngeal    Specimen: Nasopharyngeal; Swab   Result Value Ref Range    SARS CoV2 PCR Negative Negative   ECG 12-LEAD WITH MUSE (LHE)   Result Value Ref Range    Systolic Blood Pressure  mmHg    Diastolic Blood Pressure  mmHg    Ventricular  Rate 67 BPM    Atrial Rate 67 BPM    SC Interval 176 ms    QRS Duration 128 ms     ms    QTc 431 ms    P Axis 73 degrees    R AXIS -42 degrees    T Axis 106 degrees    Interpretation ECG       Sinus rhythm  Left axis deviation  Non-specific intra-ventricular conduction block  Abnormal QRS-T angle, consider primary T wave abnormality  Abnormal ECG  When compared with ECG of 30-Nov-2022 17:43,  No significant change was found  Confirmed by SEE ED PROVIDER NOTE FOR, ECG INTERPRETATION ([id number]),  Cinthya Sahni ([id number]) on 9/12/2024 2:41:50 PM         EKG     EKG reviewed and independently interpreted as below;  Sinus rhythm, rate 67.  .  .  QTc 431.  No STEMI    Procedures     None.      Paresh Rowe MD  09/12/24 1623

## 2024-09-12 NOTE — MEDICATION SCRIBE - ADMISSION MEDICATION HISTORY
Medication Scribe Admission Medication History    Admission medication history is complete. The information provided in this note is only as accurate as the sources available at the time of the update.    Information Source(s): Patient and CareEverywhere/SureScripts via in-person    Pertinent Information: Patient was previously on Novolin 70/30 10 unit(s) with dinner. This was changed on 9/10/2024 to insulin glargine (vial, not in an insulin pump) 10 unit(s) at bedtime.     Changes made to PTA medication list:  Added:   Insulin glargine vial 100 unit(s)/mL  Deleted:   Benzonatate 100 mg  Ipratropium 0.06% nasal spray  Polyethylene glycol 17 g  Insulin NPH-regular (70/30)   Changed:   Atorvastatin 20 mg --> 10 mg  Carbiopa-levodopa  mg 100 tablets QID --> 2 tab AM, 1 tab midday, 1 tab HS (previous medication error)    Allergies reviewed with patient and updates made in EHR: yes    Medication History Completed By: Tima Robb 9/12/2024 4:38 PM    PTA Med List   Medication Sig Last Dose    aspirin 81 MG EC tablet [ASPIRIN 81 MG EC TABLET] Take 81 mg by mouth bedtime. 9/11/2024 at HS    atorvastatin (LIPITOR) 10 MG tablet Take 10 mg by mouth at bedtime. 9/11/2024 at HS    carbidopa-levodopa (SINEMET)  MG tablet Take 1 tablet by mouth 2 times daily. Take 1 tablet midday and 1 tablet at bedtime. (Also taking 2 tablets QAM) 9/12/2024 at 1400; 1 of 2    carbidopa-levodopa (SINEMET)  MG tablet Take 2 tablets by mouth every morning. (Also taking 1 tablet midday and 1 tablet at bedtime) 9/12/2024 at AM    LANTUS VIAL 100 UNIT/ML soln Inject 10 Units subcutaneously at bedtime. 9/11/2024 at HS; 10 units    lisinopril (PRINIVIL,ZESTRIL) 2.5 MG tablet Take 2.5 mg by mouth daily. 9/12/2024 at AM    magnesium oxide 400 MG tablet TAKE 1 TABLET EVERY DAY 9/11/2024 at HS    metFORMIN (GLUCOPHAGE) 500 MG tablet Take 1,000 mg by mouth 2 times daily (with meals) Take 2 pills in the A.M. and 2 pills at P.m.  9/12/2024 at AM; 1 of 2    metoprolol succinate ER (TOPROL XL) 100 MG 24 hr tablet TAKE 1 TABLET AT BEDTIME 9/11/2024 at HS

## 2024-09-14 LAB — BACTERIA UR CULT: NORMAL

## 2024-09-24 DIAGNOSIS — I47.29 NSVT (NONSUSTAINED VENTRICULAR TACHYCARDIA) (H): ICD-10-CM

## 2024-09-24 RX ORDER — MAGNESIUM OXIDE 400 MG/1
400 TABLET ORAL DAILY
Qty: 90 TABLET | Refills: 0 | Status: SHIPPED | OUTPATIENT
Start: 2024-09-24 | End: 2024-09-28

## 2024-09-28 ENCOUNTER — APPOINTMENT (OUTPATIENT)
Dept: RADIOLOGY | Facility: CLINIC | Age: 89
DRG: 194 | End: 2024-09-28
Attending: EMERGENCY MEDICINE
Payer: MEDICARE

## 2024-09-28 ENCOUNTER — APPOINTMENT (OUTPATIENT)
Dept: CT IMAGING | Facility: CLINIC | Age: 89
DRG: 194 | End: 2024-09-28
Attending: EMERGENCY MEDICINE
Payer: MEDICARE

## 2024-09-28 ENCOUNTER — HOSPITAL ENCOUNTER (INPATIENT)
Facility: CLINIC | Age: 89
LOS: 2 days | Discharge: HOME | DRG: 194 | End: 2024-09-30
Attending: EMERGENCY MEDICINE | Admitting: INTERNAL MEDICINE
Payer: MEDICARE

## 2024-09-28 DIAGNOSIS — J18.9 COMMUNITY ACQUIRED PNEUMONIA OF LEFT LOWER LOBE OF LUNG: ICD-10-CM

## 2024-09-28 LAB
ANION GAP SERPL CALCULATED.3IONS-SCNC: 12 MMOL/L (ref 7–15)
BASOPHILS # BLD AUTO: 0 10E3/UL (ref 0–0.2)
BASOPHILS NFR BLD AUTO: 0 %
BUN SERPL-MCNC: 37.7 MG/DL (ref 8–23)
CALCIUM SERPL-MCNC: 9 MG/DL (ref 8.8–10.4)
CHLORIDE SERPL-SCNC: 103 MMOL/L (ref 98–107)
CREAT SERPL-MCNC: 1.54 MG/DL (ref 0.67–1.17)
CRP SERPL-MCNC: 180 MG/L
EGFRCR SERPLBLD CKD-EPI 2021: 43 ML/MIN/1.73M2
EOSINOPHIL # BLD AUTO: 0 10E3/UL (ref 0–0.7)
EOSINOPHIL NFR BLD AUTO: 0 %
ERYTHROCYTE [DISTWIDTH] IN BLOOD BY AUTOMATED COUNT: 13.1 % (ref 10–15)
FLUAV RNA SPEC QL NAA+PROBE: NEGATIVE
FLUBV RNA RESP QL NAA+PROBE: NEGATIVE
GLUCOSE BLDC GLUCOMTR-MCNC: 118 MG/DL (ref 70–99)
GLUCOSE SERPL-MCNC: 154 MG/DL (ref 70–99)
HCO3 SERPL-SCNC: 19 MMOL/L (ref 22–29)
HCT VFR BLD AUTO: 35.9 % (ref 40–53)
HGB BLD-MCNC: 11.9 G/DL (ref 13.3–17.7)
IMM GRANULOCYTES # BLD: 0 10E3/UL
IMM GRANULOCYTES NFR BLD: 0 %
L PNEUMO1 AG UR QL IA: NEGATIVE
LACTATE SERPL-SCNC: 0.9 MMOL/L (ref 0.7–2)
LYMPHOCYTES # BLD AUTO: 0.4 10E3/UL (ref 0.8–5.3)
LYMPHOCYTES NFR BLD AUTO: 4 %
MCH RBC QN AUTO: 29.6 PG (ref 26.5–33)
MCHC RBC AUTO-ENTMCNC: 33.1 G/DL (ref 31.5–36.5)
MCV RBC AUTO: 89 FL (ref 78–100)
MONOCYTES # BLD AUTO: 0.9 10E3/UL (ref 0–1.3)
MONOCYTES NFR BLD AUTO: 9 %
NEUTROPHILS # BLD AUTO: 8.2 10E3/UL (ref 1.6–8.3)
NEUTROPHILS NFR BLD AUTO: 86 %
NRBC # BLD AUTO: 0 10E3/UL
NRBC BLD AUTO-RTO: 0 /100
PLATELET # BLD AUTO: 244 10E3/UL (ref 150–450)
POTASSIUM SERPL-SCNC: 5.1 MMOL/L (ref 3.4–5.3)
PROCALCITONIN SERPL IA-MCNC: 0.27 NG/ML
RBC # BLD AUTO: 4.02 10E6/UL (ref 4.4–5.9)
RSV RNA SPEC NAA+PROBE: NEGATIVE
S PNEUM AG SPEC QL: NEGATIVE
SARS-COV-2 RNA RESP QL NAA+PROBE: NEGATIVE
SODIUM SERPL-SCNC: 134 MMOL/L (ref 135–145)
SPECIMEN TYPE: NORMAL
WBC # BLD AUTO: 9.6 10E3/UL (ref 4–11)

## 2024-09-28 PROCEDURE — 87040 BLOOD CULTURE FOR BACTERIA: CPT | Performed by: EMERGENCY MEDICINE

## 2024-09-28 PROCEDURE — 83605 ASSAY OF LACTIC ACID: CPT | Performed by: EMERGENCY MEDICINE

## 2024-09-28 PROCEDURE — 84145 PROCALCITONIN (PCT): CPT | Performed by: EMERGENCY MEDICINE

## 2024-09-28 PROCEDURE — 250N000012 HC RX MED GY IP 250 OP 636 PS 637: Performed by: INTERNAL MEDICINE

## 2024-09-28 PROCEDURE — 250N000013 HC RX MED GY IP 250 OP 250 PS 637: Performed by: INTERNAL MEDICINE

## 2024-09-28 PROCEDURE — 71046 X-RAY EXAM CHEST 2 VIEWS: CPT

## 2024-09-28 PROCEDURE — 80048 BASIC METABOLIC PNL TOTAL CA: CPT | Performed by: EMERGENCY MEDICINE

## 2024-09-28 PROCEDURE — 120N000004 HC R&B MS OVERFLOW

## 2024-09-28 PROCEDURE — 99285 EMERGENCY DEPT VISIT HI MDM: CPT | Mod: 25

## 2024-09-28 PROCEDURE — 87486 CHLMYD PNEUM DNA AMP PROBE: CPT | Performed by: INTERNAL MEDICINE

## 2024-09-28 PROCEDURE — 86140 C-REACTIVE PROTEIN: CPT | Performed by: EMERGENCY MEDICINE

## 2024-09-28 PROCEDURE — 99223 1ST HOSP IP/OBS HIGH 75: CPT | Mod: AI | Performed by: INTERNAL MEDICINE

## 2024-09-28 PROCEDURE — 36415 COLL VENOUS BLD VENIPUNCTURE: CPT | Performed by: EMERGENCY MEDICINE

## 2024-09-28 PROCEDURE — 87081 CULTURE SCREEN ONLY: CPT | Performed by: INTERNAL MEDICINE

## 2024-09-28 PROCEDURE — 96361 HYDRATE IV INFUSION ADD-ON: CPT

## 2024-09-28 PROCEDURE — 258N000003 HC RX IP 258 OP 636: Performed by: EMERGENCY MEDICINE

## 2024-09-28 PROCEDURE — 96365 THER/PROPH/DIAG IV INF INIT: CPT

## 2024-09-28 PROCEDURE — 85025 COMPLETE CBC W/AUTO DIFF WBC: CPT | Performed by: EMERGENCY MEDICINE

## 2024-09-28 PROCEDURE — 96367 TX/PROPH/DG ADDL SEQ IV INF: CPT

## 2024-09-28 PROCEDURE — 87637 SARSCOV2&INF A&B&RSV AMP PRB: CPT | Performed by: EMERGENCY MEDICINE

## 2024-09-28 PROCEDURE — 250N000011 HC RX IP 250 OP 636: Performed by: EMERGENCY MEDICINE

## 2024-09-28 PROCEDURE — 250N000011 HC RX IP 250 OP 636: Performed by: INTERNAL MEDICINE

## 2024-09-28 PROCEDURE — 250N000013 HC RX MED GY IP 250 OP 250 PS 637: Performed by: EMERGENCY MEDICINE

## 2024-09-28 PROCEDURE — 120N000001 HC R&B MED SURG/OB

## 2024-09-28 PROCEDURE — 71250 CT THORAX DX C-: CPT | Mod: MG

## 2024-09-28 PROCEDURE — 87899 AGENT NOS ASSAY W/OPTIC: CPT | Performed by: INTERNAL MEDICINE

## 2024-09-28 PROCEDURE — 999N000157 HC STATISTIC RCP TIME EA 10 MIN

## 2024-09-28 RX ORDER — ACETAMINOPHEN 325 MG/1
975 TABLET ORAL ONCE
Status: COMPLETED | OUTPATIENT
Start: 2024-09-28 | End: 2024-09-28

## 2024-09-28 RX ORDER — CODEINE PHOSPHATE AND GUAIFENESIN 10; 100 MG/5ML; MG/5ML
10 SOLUTION ORAL EVERY 4 HOURS PRN
Status: DISCONTINUED | OUTPATIENT
Start: 2024-09-28 | End: 2024-09-30 | Stop reason: HOSPADM

## 2024-09-28 RX ORDER — ACETAMINOPHEN 325 MG/1
650 TABLET ORAL EVERY 4 HOURS PRN
Status: DISCONTINUED | OUTPATIENT
Start: 2024-09-28 | End: 2024-09-30 | Stop reason: HOSPADM

## 2024-09-28 RX ORDER — PIPERACILLIN SODIUM, TAZOBACTAM SODIUM 3; .375 G/15ML; G/15ML
3.38 INJECTION, POWDER, LYOPHILIZED, FOR SOLUTION INTRAVENOUS EVERY 8 HOURS
Status: DISCONTINUED | OUTPATIENT
Start: 2024-09-29 | End: 2024-09-29

## 2024-09-28 RX ORDER — ATORVASTATIN CALCIUM 10 MG/1
10 TABLET, FILM COATED ORAL AT BEDTIME
Status: DISCONTINUED | OUTPATIENT
Start: 2024-09-28 | End: 2024-09-30 | Stop reason: HOSPADM

## 2024-09-28 RX ORDER — DOXYCYCLINE 100 MG/10ML
100 INJECTION, POWDER, LYOPHILIZED, FOR SOLUTION INTRAVENOUS EVERY 12 HOURS
Status: DISCONTINUED | OUTPATIENT
Start: 2024-09-28 | End: 2024-09-29

## 2024-09-28 RX ORDER — AZITHROMYCIN 500 MG/5ML
500 INJECTION, POWDER, LYOPHILIZED, FOR SOLUTION INTRAVENOUS ONCE
Status: COMPLETED | OUTPATIENT
Start: 2024-09-28 | End: 2024-09-28

## 2024-09-28 RX ORDER — LIDOCAINE 40 MG/G
CREAM TOPICAL
Status: DISCONTINUED | OUTPATIENT
Start: 2024-09-28 | End: 2024-09-30 | Stop reason: HOSPADM

## 2024-09-28 RX ORDER — DEXTROSE MONOHYDRATE 25 G/50ML
25-50 INJECTION, SOLUTION INTRAVENOUS
Status: DISCONTINUED | OUTPATIENT
Start: 2024-09-28 | End: 2024-09-30 | Stop reason: HOSPADM

## 2024-09-28 RX ORDER — METOPROLOL SUCCINATE 100 MG/1
100 TABLET, EXTENDED RELEASE ORAL AT BEDTIME
Status: DISCONTINUED | OUTPATIENT
Start: 2024-09-28 | End: 2024-09-30 | Stop reason: HOSPADM

## 2024-09-28 RX ORDER — CARBIDOPA AND LEVODOPA 25; 100 MG/1; MG/1
1 TABLET ORAL 2 TIMES DAILY
Status: DISCONTINUED | OUTPATIENT
Start: 2024-09-28 | End: 2024-09-30 | Stop reason: HOSPADM

## 2024-09-28 RX ORDER — ALBUTEROL SULFATE 0.83 MG/ML
3 SOLUTION RESPIRATORY (INHALATION)
Status: DISCONTINUED | OUTPATIENT
Start: 2024-09-28 | End: 2024-09-30 | Stop reason: HOSPADM

## 2024-09-28 RX ORDER — CARBIDOPA AND LEVODOPA 25; 100 MG/1; MG/1
2 TABLET ORAL EVERY MORNING
Status: DISCONTINUED | OUTPATIENT
Start: 2024-09-29 | End: 2024-09-30 | Stop reason: HOSPADM

## 2024-09-28 RX ORDER — ENOXAPARIN SODIUM 100 MG/ML
40 INJECTION SUBCUTANEOUS EVERY 24 HOURS
Status: DISCONTINUED | OUTPATIENT
Start: 2024-09-28 | End: 2024-09-30 | Stop reason: HOSPADM

## 2024-09-28 RX ORDER — POLYETHYLENE GLYCOL 3350 17 G/17G
17 POWDER, FOR SOLUTION ORAL 2 TIMES DAILY PRN
Status: DISCONTINUED | OUTPATIENT
Start: 2024-09-28 | End: 2024-09-30 | Stop reason: HOSPADM

## 2024-09-28 RX ORDER — ASPIRIN 81 MG/1
81 TABLET ORAL AT BEDTIME
Status: DISCONTINUED | OUTPATIENT
Start: 2024-09-28 | End: 2024-09-30 | Stop reason: HOSPADM

## 2024-09-28 RX ORDER — NICOTINE POLACRILEX 4 MG
15-30 LOZENGE BUCCAL
Status: DISCONTINUED | OUTPATIENT
Start: 2024-09-28 | End: 2024-09-30 | Stop reason: HOSPADM

## 2024-09-28 RX ORDER — LISINOPRIL 2.5 MG/1
2.5 TABLET ORAL DAILY
Status: DISCONTINUED | OUTPATIENT
Start: 2024-09-29 | End: 2024-09-30 | Stop reason: HOSPADM

## 2024-09-28 RX ORDER — ACETAMINOPHEN 650 MG/1
650 SUPPOSITORY RECTAL EVERY 4 HOURS PRN
Status: DISCONTINUED | OUTPATIENT
Start: 2024-09-28 | End: 2024-09-30 | Stop reason: HOSPADM

## 2024-09-28 RX ORDER — PIPERACILLIN SODIUM, TAZOBACTAM SODIUM 3; .375 G/15ML; G/15ML
3.38 INJECTION, POWDER, LYOPHILIZED, FOR SOLUTION INTRAVENOUS ONCE
Status: COMPLETED | OUTPATIENT
Start: 2024-09-28 | End: 2024-09-29

## 2024-09-28 RX ORDER — CALCIUM CARBONATE 500 MG/1
1000 TABLET, CHEWABLE ORAL 4 TIMES DAILY PRN
Status: DISCONTINUED | OUTPATIENT
Start: 2024-09-28 | End: 2024-09-30 | Stop reason: HOSPADM

## 2024-09-28 RX ORDER — CEFTRIAXONE 2 G/1
2 INJECTION, POWDER, FOR SOLUTION INTRAMUSCULAR; INTRAVENOUS ONCE
Status: COMPLETED | OUTPATIENT
Start: 2024-09-28 | End: 2024-09-28

## 2024-09-28 RX ADMIN — ACETAMINOPHEN 975 MG: 325 TABLET ORAL at 14:56

## 2024-09-28 RX ADMIN — AZITHROMYCIN MONOHYDRATE 500 MG: 500 INJECTION, POWDER, LYOPHILIZED, FOR SOLUTION INTRAVENOUS at 15:39

## 2024-09-28 RX ADMIN — CEFTRIAXONE SODIUM 2 G: 2 INJECTION, POWDER, FOR SOLUTION INTRAMUSCULAR; INTRAVENOUS at 14:58

## 2024-09-28 RX ADMIN — INSULIN GLARGINE 10 UNITS: 100 INJECTION, SOLUTION SUBCUTANEOUS at 22:03

## 2024-09-28 RX ADMIN — CARBIDOPA AND LEVODOPA 1 TABLET: 25; 100 TABLET ORAL at 22:03

## 2024-09-28 RX ADMIN — GUAIFENESIN AND CODEINE PHOSPHATE 10 ML: 100; 10 SOLUTION ORAL at 22:04

## 2024-09-28 RX ADMIN — SODIUM CHLORIDE 1000 ML: 9 INJECTION, SOLUTION INTRAVENOUS at 14:57

## 2024-09-28 RX ADMIN — SODIUM CHLORIDE 1000 ML: 9 INJECTION, SOLUTION INTRAVENOUS at 16:37

## 2024-09-28 RX ADMIN — ASPIRIN 81 MG: 81 TABLET, COATED ORAL at 22:03

## 2024-09-28 RX ADMIN — PIPERACILLIN AND TAZOBACTAM 3.38 G: 3; .375 INJECTION, POWDER, FOR SOLUTION INTRAVENOUS at 20:37

## 2024-09-28 RX ADMIN — ATORVASTATIN CALCIUM 10 MG: 10 TABLET, FILM COATED ORAL at 22:03

## 2024-09-28 RX ADMIN — DOXYCYCLINE 100 MG: 100 INJECTION, POWDER, LYOPHILIZED, FOR SOLUTION INTRAVENOUS at 20:37

## 2024-09-28 RX ADMIN — ENOXAPARIN SODIUM 40 MG: 100 INJECTION SUBCUTANEOUS at 20:43

## 2024-09-28 ASSESSMENT — ENCOUNTER SYMPTOMS
COUGH: 1
FATIGUE: 1
FEVER: 1
AGITATION: 1
VOMITING: 0
SORE THROAT: 0
ABDOMINAL PAIN: 0
ROS GI COMMENTS: BLOATED
DYSURIA: 0
SHORTNESS OF BREATH: 0
NAUSEA: 0
DIARRHEA: 0
RHINORRHEA: 0

## 2024-09-28 ASSESSMENT — ACTIVITIES OF DAILY LIVING (ADL)
ADLS_ACUITY_SCORE: 22
ADLS_ACUITY_SCORE: 35
ADLS_ACUITY_SCORE: 22
ADLS_ACUITY_SCORE: 35
DEPENDENT_IADLS:: COOKING;LAUNDRY;MEAL PREPARATION
ADLS_ACUITY_SCORE: 35
ADLS_ACUITY_SCORE: 26

## 2024-09-28 NOTE — ED PROVIDER NOTES
"Emergency Department Midlevel Supervisory Note     I had a face to face encounter with this patient seen by the Advanced Practice Provider (TAMY). I personally made/approved the management plan and take responsibility for the patient management. I personally saw patient and performed a substantive portion of the visit including all aspects of the medical decision making.     ED Course:  2:45 PM Jeanine Licona PA-C staffed patient with me. I agree with their assessment and plan of management, and I will see the patient.  2:47 PM I met with the patient to introduce myself, gather additional history, perform my initial exam, and discuss the plan.     Brief HPI:     Anthony Tiwari is a 89 year old male who presents for evaluation of fatigue and cough.    Patient was diagnosed with pneumonia on 9/12. He presents with increased generalized weakness and a cough today.     I, Jackie Mejia, am serving as a scribe to document services personally performed by Meme Crandall MD, based on my observations and the provider's statements to me.   I, Meme Crandall MD attest that Jackie Mejia was acting in a scribe capacity, has observed my performance of the services and has documented them in accordance with my direction.    Brief Physical Exam: /62   Pulse 66   Temp (!) 101.1  F (38.4  C) (Oral)   Resp 16   Ht 1.778 m (5' 10\")   Wt 77.1 kg (170 lb)   SpO2 94%   BMI 24.39 kg/m    Constitutional:  Alert, in no acute distress  EYES: Conjunctivae clear  HENT:  Atraumatic  Respiratory:  Respirations even, unlabored, in no acute respiratory distress  Cardiovascular:  Regular rate and rhythm, good peripheral perfusion  GI: Soft, non-distended, non-tender  Musculoskeletal:  Moves all 4 extremities equally, grossly symmetrical strength  Integument: Warm & dry. No appreciable rash, erythema.  Neurologic:  Alert & oriented, speech clear and fluent, no focal deficits noted  Psych: Normal mood and affect     "   MDM:  ***       No diagnosis found.    Consults:  I discussed the patient with *** who recommends ***    Labs and Imaging:       I have reviewed the relevant laboratory studies above.    I independently interpreted the following imaging study(s):   ***    EKG: I reviewed and independently interpreted the patient's EKG, with comments made as listed below. Please see scanned EKG for full report.   ***    Procedures:  I was present for the key portions of procedures documented in TAMY/midlevel note, see midlevel note for further details.    Meme Crandall MD  [facility] EMERGENCY ROOM  [address] [id number] [phone]

## 2024-09-28 NOTE — ED TRIAGE NOTES
Pt presents to the ED with c/o increased weakness and cough today. Pt was dx with pneumonia on 9/12.

## 2024-09-28 NOTE — ED PROVIDER NOTES
EMERGENCY DEPARTMENT ENCOUNTER      NAME: Anthony Tiwari  AGE: 89 year old male  YOB: 1935  MRN: [id number]  EVALUATION DATE & TIME: No admission date for patient encounter.    PCP: Alejandro Meredith    ED PROVIDER: Jeanine Licona PA-C      Chief Complaint   Patient presents with    Fatigue    Cough         FINAL IMPRESSION:  1. Community acquired pneumonia of left lower lobe of lung          ED COURSE & MEDICAL DECISION MAKING:    Pertinent Labs & Imaging studies reviewed. (See chart for details)    89 year old male presents to the Emergency Department for evaluation of cough and fatigue.     Physical exam is remarkable for an ill appearing male, lethargic on exam. Heart and lung sounds remarkable for crackles in the left lung diffusely, wet sounding cough noted. Abdomen is soft and non-tender. Vital signs remarkable for intermittent borderline hypotension, improved after IV fluids; patient was febrile throughout his visit which improved after Tylenol.  Remainder of vitals are stable.    CBC remarkable for mild anemia with hemoglobin of 11.9, no leukocytosis.  BMP unremarkable with no significant electrolyte derangements, normal kidney function.  Lactic acid within normal limits.  Procalcitonin is 0.27.  CRP elevated at 180.  COVID/flu/RSV swab negative.  1 blood culture is pending.  Chest x-ray was negative however given the crackles noted in his left lower lung combined with high fever and wet cough, I did obtain a CT scan which does confirm a pneumonia in the left lower lung.    The patient was given 2 L of fluids and antibiosis was initiated with IV Rocephin and azithromycin.  Given the patient's intermittent hypotension, a second peripheral line was placed-vasopressors not initiated in the emergency department as his blood pressure responded well to IV fluids. He will be admitted to the hospital for treatment of left lower lung pneumonia.  He is agreeable with this treatment plan and  verbalized understanding. Care discussed with Dr. Meme Crandall who is in agreement with the treatment plan and verbalized understanding.       Medical Decision Making  Obtained supplemental history:Supplemental history obtained?: Caregiver and Family Member/Significant Other  Reviewed external records: External records reviewed?: Inpatient Record: ER visit on 09/12/24  Care impacted by chronic illness:Diabetes, Heart Disease, and Hypertension  Care significantly affected by social determinants of health:Access to Medical Care  Did you consider but not order tests?: Work up considered but not performed and documented in chart, if applicable  Did you interpret images independently?: My preliminary independent interpretation of images are infiltrate seen in left lower lung on CT.  See radiology notes for final report.  Consultation discussion with other provider:Did you involve another provider (consultant, , pharmacy, etc.)?: I discussed the care with another health care provider, see documentation for details.  Admit.  Not Applicable      ED Course   2:17 PM Attempted to see patient in [facility], he is having labs drawn.   2:35 PM Performed my initial history and physical exam. Discussed workup in the emergency department, management of symptoms, and likely disposition.   2:48 PM Staffed with Dr. Meme Crandall  6:10 PM Updated with test results.   7:04 PM Discussed with Dr. Garcia who accepts the patient for admission.     At the conclusion of the encounter I discussed the results of all of the tests and the disposition. The questions were answered. The patient or family acknowledged understanding and was agreeable with the care plan.     Voice recognition software was used in the creation of this note. Any grammatical or nonsensical errors are due to inherent errors with the software and are not the intention of the writer.     MEDICATIONS GIVEN IN THE EMERGENCY:  Medications   sodium chloride 0.9% BOLUS  "1,000 mL (0 mLs Intravenous Stopped 9/28/24 1550)   acetaminophen (TYLENOL) tablet 975 mg (975 mg Oral $Given 9/28/24 1456)   cefTRIAXone (ROCEPHIN) 2 g vial to attach to  ml bag for ADULTS or NS 50 ml bag for PEDS (0 g Intravenous Stopped 9/28/24 1539)   azithromycin (ZITHROMAX) 500 mg in  mL intermittent infusion (0 mg Intravenous Stopped 9/28/24 1700)   sodium chloride 0.9% BOLUS 1,000 mL (0 mLs Intravenous Stopped 9/28/24 1739)       NEW PRESCRIPTIONS STARTED AT TODAY'S ER VISIT  New Prescriptions    No medications on file            =================================================================    HPI    Patient information was obtained from: Patient, patient's daughter and wife    Use of : N/A         Anthony Tiwari is a 89 year old male with PMH of CKD, Htn, DM2, lymphoma who presents to the ED via walk-in with daughter and wife for evaluation of lethargy.     The patient was seen here about 2 weeks ago and diagnosed with a left lower lung pneumonia, he was prescribed azithromycin and Augmentin.  He completed the courses of antibiotics with reported improvement.  However, the last few days he has seemed more irritable and lethargic per his daughter's report.  He still has a productive cough and sometimes coughs so hard that he vomits.  He reportedly told his wife that his stomach felt \"bloated\" but has denied any pain.  No known sick exposures, no recent travel.    Family denies any diarrhea, urinary changes, vomiting, chest pain, or difficulty breathing.    REVIEW OF SYSTEMS   Review of Systems   Constitutional:  Positive for fatigue and fever.   HENT:  Negative for congestion, rhinorrhea and sore throat.    Respiratory:  Positive for cough. Negative for shortness of breath.    Cardiovascular:  Negative for chest pain.   Gastrointestinal:  Negative for abdominal pain, diarrhea, nausea and vomiting (Occasional post-tussive).        Bloated     Genitourinary:  Negative for dysuria. "   Psychiatric/Behavioral:  Positive for agitation.        All other systems reviewed and are negative unless noted in HPI.      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 Cody at [facility], getting chemo again in 2017    Nonocclusive coronary atherosclerosis of native coronary artery     nonobstuctive by cor angio      SIRIA on CPAP 2013    he sees someone at [facility]    Other primary cardiomyopathies 2013    EF was as low as 23%    Statin intolerance 02/21/2019    history of elevated CPK on both simvastatin but less so on atorvastatin.    Type 2 diabetes mellitus (H)        PAST SURGICAL HISTORY:  Past Surgical History:   Procedure Laterality Date    CARDIAC DEFIBRILLATOR PLACEMENT  07/24/2013    Dr. Rodriguez    CATARACT EXTRACTION W/ INTRAOCULAR LENS  IMPLANT, BILATERAL  05/2018    CYSTOSCOPY W/ LITHOLAPAXY / EHL N/A 03/19/2019    Procedure: CYSTOLITHOLAPAXY;  Surgeon: Yunior Moore MD;  Location: [facility] OR;  Service: Urology    HERNIA REPAIR Left     RI CYSTO/URETERO W/LITHOTRIPSY &INDWELL STENT INSRT Right 03/19/2019    Procedure: CYSTOSCOPY RIGHT URETEROSCOPY LASER LITHOTRIPSY;  Surgeon: Yunior Moore MD;  Location: [facility] OR;  Service: Urology    TOTAL HIP ARTHROPLASTY Right 2012    URETEROSCOPY         CURRENT MEDICATIONS:    aspirin 81 MG EC tablet  atorvastatin (LIPITOR) 10 MG tablet  carbidopa-levodopa (SINEMET)  MG tablet  carbidopa-levodopa (SINEMET)  MG tablet  LANTUS VIAL 100 UNIT/ML soln  lisinopril (PRINIVIL,ZESTRIL) 2.5 MG tablet  metFORMIN  (GLUCOPHAGE) 500 MG tablet  metoprolol succinate ER (TOPROL XL) 100 MG 24 hr tablet  CONTOUR NEXT TEST test strip        ALLERGIES:  Allergies   Allergen Reactions    Simvastatin Other (See Comments)     Elevated CPK       FAMILY HISTORY:  Family History   Problem Relation Age of Onset    Rheumatic Heart Disease Brother         half brother    Dementia Brother     Cerebrovascular Disease Father     Rheumatic Heart Disease Sister     Sudden Death Sister 67.00        no autopsy    CABG Sister 81.00    No Known Problems Daughter     No Known Problems Daughter     No Known Problems Daughter     No Known Problems Granddaughter     No Known Problems Grandson     Urolithiasis No family hx of     Clotting Disorder No family hx of     Gout No family hx of        SOCIAL HISTORY:   Social History     Socioeconomic History    Marital status:     Number of children: 3   Tobacco Use    Smoking status: Never    Smokeless tobacco: Never   Vaping Use    Vaping status: Never Used   Substance and Sexual Activity    Alcohol use: No    Drug use: No   Social History Narrative    He used to walk for an hour most days and still takes care of his lawn and snow removal, though his grandsons are helping more with the lawn in 2018. 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 He is walking less in 2019. His wife,  Rebeka, is 6 years his tu and is providing daytime and overnight  for their daughter Loree's fraternal twins, Estrella and Jovanny, since 2019. Anthony spends 5 hours a day with Loree and the twins.     Social Determinants of Health      [facility] [facility]    Financial Resource Strain    [facility], [employer] [facility] [employer] [facility]    Social Connections       VITALS:  Patient Vitals for the past 24 hrs:   BP Temp Temp src Pulse Resp SpO2 Height  "Weight   09/28/24 1900 125/56 -- -- 66 -- 96 % -- --   09/28/24 1830 99/52 -- -- 61 -- 94 % -- --   09/28/24 1815 98/54 -- -- 60 -- 94 % -- --   09/28/24 1800 91/52 -- -- 60 -- 93 % -- --   09/28/24 1745 (!) 89/54 -- -- 60 -- 95 % -- --   09/28/24 1730 (!) 89/52 -- -- 61 -- 95 % -- --   09/28/24 1715 95/55 -- -- -- -- 95 % -- --   09/28/24 1630 (!) 83/51 -- -- 68 -- 94 % -- --   09/28/24 1545 -- -- -- 69 -- 94 % -- --   09/28/24 1511 100/54 -- -- 68 -- 93 % -- --   09/28/24 1332 131/62 (!) 101.1  F (38.4  C) Oral 66 16 94 % 1.778 m (5' 10\") 77.1 kg (170 lb)       PHYSICAL EXAM    VITAL SIGNS: /56   Pulse 66   Temp (!) 101.1  F (38.4  C) (Oral)   Resp 16   Ht 1.778 m (5' 10\")   Wt 77.1 kg (170 lb)   SpO2 96%   BMI 24.39 kg/m    General Appearance: Ill appearing; Alert, cooperative, normal speech and facial symmetry, appears stated age  Head:  Normocephalic, without obvious abnormality, atraumatic  Cardio:  Regular rate and rhythm, S1 and S2 normal, no murmur, rub    or gallop, 2+ pulses symmetric in all extremities  Pulm:  Crackles in the left lower lung, wet sounding cough  Abdomen:  Active bowel sounds in all quadrants; abdomen is soft, non-distended with no tenderness to palpation, rebound tenderness, or guarding.   Extremities: Moves all extremities; no edema  Neuro: Patient is awake, alert, and responsive to voice. No gross motor weaknesses or sensory loss; moves all extremities.    LAB:  All pertinent labs reviewed and interpreted.  Labs Ordered and Resulted from Time of ED Arrival to Time of ED Departure   BASIC METABOLIC PANEL - Abnormal       Result Value    Sodium 134 (*)     Potassium 5.1      Chloride 103      Carbon Dioxide (CO2) 19 (*)     Anion Gap 12      Urea Nitrogen 37.7 (*)     Creatinine 1.54 (*)     GFR Estimate 43 (*)     Calcium 9.0      Glucose 154 (*)    CRP INFLAMMATION - Abnormal    CRP Inflammation 180.00 (*)    CBC WITH PLATELETS AND DIFFERENTIAL - Abnormal    WBC Count 9.6  "     RBC Count 4.02 (*)     Hemoglobin 11.9 (*)     Hematocrit 35.9 (*)     MCV 89      MCH 29.6      MCHC 33.1      RDW 13.1      Platelet Count 244      % Neutrophils 86      % Lymphocytes 4      % Monocytes 9      % Eosinophils 0      % Basophils 0      % Immature Granulocytes 0      NRBCs per 100 WBC 0      Absolute Neutrophils 8.2      Absolute Lymphocytes 0.4 (*)     Absolute Monocytes 0.9      Absolute Eosinophils 0.0      Absolute Basophils 0.0      Absolute Immature Granulocytes 0.0      Absolute NRBCs 0.0     LACTIC ACID WHOLE BLOOD WITH 1X REPEAT IN 2 HR WHEN >2 - Normal    Lactic Acid, Initial 0.9     INFLUENZA A/B, RSV, & SARS-COV2 PCR - Normal    Influenza A PCR Negative      Influenza B PCR Negative      RSV PCR Negative      SARS CoV2 PCR Negative     PROCALCITONIN - Normal    Procalcitonin 0.27     ROUTINE UA WITH MICROSCOPIC REFLEX TO CULTURE   BLOOD CULTURE       RADIOLOGY:  Reviewed all pertinent imaging. Please see official radiology report.  Chest CT w/o contrast   Final Result   IMPRESSION:    1.  Patchy consolidation newly seen with micronodular opacities primarily at the left lower lobe. Associated bibasilar peribronchial wall thickening. Correlate with pneumonia.   2.  Thoracic lymph nodes are small in size, but a few are slightly larger as compared to 7/15/2024.         Chest XR,  PA & LAT   Final Result   IMPRESSION: No consolidation, pleural effusion or pneumothorax. Stable cardiac size. Dual-lead AICD appear stable in position.            Jeanine Licona PA-C  Emergency Medicine  [facility] EMERGENCY ROOM  [address] [id number] [phone]  Dept: [phone]       Jeanine Licona PA-C  09/28/24 1911

## 2024-09-28 NOTE — PHARMACY-ADMISSION MEDICATION HISTORY
Pharmacist Admission Medication History    Admission medication history is complete. The information provided in this note is only as accurate as the sources available at the time of the update.    Information Source(s): Patient, Family member, and CareEverywhere/SureScripts via in-person    Pertinent Information: no changes to medications since 9/12. Spoke to patient, daughter, and wife about medications     Changes made to PTA medication list:  Added: None  Deleted: None  Changed: None    Allergies reviewed with patient and updates made in EHR: yes    Medication History Completed By: Monica Dahl [facility] 9/28/2024 3:26 PM    PTA Med List   Medication Sig Last Dose    aspirin 81 MG EC tablet [ASPIRIN 81 MG EC TABLET] Take 81 mg by mouth bedtime. 9/27/2024 at HS    atorvastatin (LIPITOR) 10 MG tablet Take 10 mg by mouth at bedtime. 9/27/2024 at HS    carbidopa-levodopa (SINEMET)  MG tablet Take 1 tablet by mouth 2 times daily. Take 1 tablet midday and 1 tablet at bedtime. (Also taking 2 tablets QAM) 9/28/2024 at 1400    carbidopa-levodopa (SINEMET)  MG tablet Take 2 tablets by mouth every morning. (Also taking 1 tablet midday and 1 tablet at bedtime) 9/28/2024 at AM    LANTUS VIAL 100 UNIT/ML soln Inject 10 Units subcutaneously at bedtime. 9/27/2024 at HS    lisinopril (PRINIVIL,ZESTRIL) 2.5 MG tablet Take 2.5 mg by mouth daily. 9/28/2024 at AM    metFORMIN (GLUCOPHAGE) 500 MG tablet Take 1,000 mg by mouth 2 times daily (with meals) Take 2 pills in the A.M. and 2 pills at P.m. 9/28/2024 at AM    metoprolol succinate ER (TOPROL XL) 100 MG 24 hr tablet TAKE 1 TABLET AT BEDTIME 9/27/2024 at HS

## 2024-09-29 ENCOUNTER — APPOINTMENT (OUTPATIENT)
Dept: SPEECH THERAPY | Facility: CLINIC | Age: 89
DRG: 194 | End: 2024-09-29
Attending: INTERNAL MEDICINE
Payer: MEDICARE

## 2024-09-29 ENCOUNTER — APPOINTMENT (OUTPATIENT)
Dept: OCCUPATIONAL THERAPY | Facility: CLINIC | Age: 89
DRG: 194 | End: 2024-09-29
Attending: INTERNAL MEDICINE
Payer: MEDICARE

## 2024-09-29 ENCOUNTER — APPOINTMENT (OUTPATIENT)
Dept: PHYSICAL THERAPY | Facility: CLINIC | Age: 89
DRG: 194 | End: 2024-09-29
Attending: INTERNAL MEDICINE
Payer: MEDICARE

## 2024-09-29 DIAGNOSIS — R93.89 ABNORMAL CHEST CT: Primary | ICD-10-CM

## 2024-09-29 DIAGNOSIS — J15.9 COMMUNITY ACQUIRED BACTERIAL PNEUMONIA: ICD-10-CM

## 2024-09-29 LAB
ALBUMIN UR-MCNC: 30 MG/DL
ANION GAP SERPL CALCULATED.3IONS-SCNC: 12 MMOL/L (ref 7–15)
APPEARANCE UR: ABNORMAL
BACTERIA SPT CULT: NORMAL
BILIRUB UR QL STRIP: NEGATIVE
BUN SERPL-MCNC: 29.6 MG/DL (ref 8–23)
C PNEUM DNA SPEC QL NAA+PROBE: NOT DETECTED
CALCIUM SERPL-MCNC: 8.1 MG/DL (ref 8.8–10.4)
CHLORIDE SERPL-SCNC: 107 MMOL/L (ref 98–107)
COLOR UR AUTO: YELLOW
CREAT SERPL-MCNC: 1.47 MG/DL (ref 0.67–1.17)
EGFRCR SERPLBLD CKD-EPI 2021: 45 ML/MIN/1.73M2
ERYTHROCYTE [DISTWIDTH] IN BLOOD BY AUTOMATED COUNT: 13.2 % (ref 10–15)
FLUAV H1 2009 PAND RNA SPEC QL NAA+PROBE: NOT DETECTED
FLUAV H1 RNA SPEC QL NAA+PROBE: NOT DETECTED
FLUAV H3 RNA SPEC QL NAA+PROBE: NOT DETECTED
FLUAV RNA SPEC QL NAA+PROBE: NOT DETECTED
FLUBV RNA SPEC QL NAA+PROBE: NOT DETECTED
GLUCOSE BLDC GLUCOMTR-MCNC: 120 MG/DL (ref 70–99)
GLUCOSE BLDC GLUCOMTR-MCNC: 120 MG/DL (ref 70–99)
GLUCOSE BLDC GLUCOMTR-MCNC: 143 MG/DL (ref 70–99)
GLUCOSE BLDC GLUCOMTR-MCNC: 96 MG/DL (ref 70–99)
GLUCOSE SERPL-MCNC: 152 MG/DL (ref 70–99)
GLUCOSE UR STRIP-MCNC: NEGATIVE MG/DL
GRAM STAIN RESULT: NORMAL
HADV DNA SPEC QL NAA+PROBE: NOT DETECTED
HCO3 SERPL-SCNC: 15 MMOL/L (ref 22–29)
HCOV PNL SPEC NAA+PROBE: NOT DETECTED
HCT VFR BLD AUTO: 31 % (ref 40–53)
HGB BLD-MCNC: 10.3 G/DL (ref 13.3–17.7)
HGB UR QL STRIP: ABNORMAL
HMPV RNA SPEC QL NAA+PROBE: NOT DETECTED
HPIV1 RNA SPEC QL NAA+PROBE: NOT DETECTED
HPIV2 RNA SPEC QL NAA+PROBE: NOT DETECTED
HPIV3 RNA SPEC QL NAA+PROBE: NOT DETECTED
HPIV4 RNA SPEC QL NAA+PROBE: NOT DETECTED
HYALINE CASTS: 3 /LPF
KETONES UR STRIP-MCNC: NEGATIVE MG/DL
LEUKOCYTE ESTERASE UR QL STRIP: ABNORMAL
M PNEUMO DNA SPEC QL NAA+PROBE: DETECTED
MCH RBC QN AUTO: 30.2 PG (ref 26.5–33)
MCHC RBC AUTO-ENTMCNC: 33.2 G/DL (ref 31.5–36.5)
MCV RBC AUTO: 91 FL (ref 78–100)
MUCOUS THREADS #/AREA URNS LPF: PRESENT /LPF
NITRATE UR QL: NEGATIVE
PH UR STRIP: 5 [PH] (ref 5–7)
PLATELET # BLD AUTO: 186 10E3/UL (ref 150–450)
POTASSIUM SERPL-SCNC: 4 MMOL/L (ref 3.4–5.3)
RBC # BLD AUTO: 3.41 10E6/UL (ref 4.4–5.9)
RBC URINE: 6 /HPF
RSV RNA SPEC QL NAA+PROBE: NOT DETECTED
RSV RNA SPEC QL NAA+PROBE: NOT DETECTED
RV+EV RNA SPEC QL NAA+PROBE: NOT DETECTED
SODIUM SERPL-SCNC: 134 MMOL/L (ref 135–145)
SP GR UR STRIP: 1.02 (ref 1–1.03)
SQUAMOUS EPITHELIAL: <1 /HPF
URATE CRY #/AREA URNS HPF: ABNORMAL /HPF
UROBILINOGEN UR STRIP-MCNC: <2 MG/DL
WBC # BLD AUTO: 6.7 10E3/UL (ref 4–11)
WBC URINE: 29 /HPF

## 2024-09-29 PROCEDURE — 272N000202 HC AEROBIKA WITH MANOMETER

## 2024-09-29 PROCEDURE — 999N000156 HC STATISTIC RCP CONSULT EA 30 MIN

## 2024-09-29 PROCEDURE — 80048 BASIC METABOLIC PNL TOTAL CA: CPT | Performed by: INTERNAL MEDICINE

## 2024-09-29 PROCEDURE — 87070 CULTURE OTHR SPECIMN AEROBIC: CPT | Performed by: INTERNAL MEDICINE

## 2024-09-29 PROCEDURE — 250N000013 HC RX MED GY IP 250 OP 250 PS 637: Performed by: INTERNAL MEDICINE

## 2024-09-29 PROCEDURE — 999N000157 HC STATISTIC RCP TIME EA 10 MIN

## 2024-09-29 PROCEDURE — 87205 SMEAR GRAM STAIN: CPT | Performed by: INTERNAL MEDICINE

## 2024-09-29 PROCEDURE — 97165 OT EVAL LOW COMPLEX 30 MIN: CPT | Mod: GO

## 2024-09-29 PROCEDURE — 97162 PT EVAL MOD COMPLEX 30 MIN: CPT | Mod: GP

## 2024-09-29 PROCEDURE — 250N000011 HC RX IP 250 OP 636: Performed by: INTERNAL MEDICINE

## 2024-09-29 PROCEDURE — 94640 AIRWAY INHALATION TREATMENT: CPT

## 2024-09-29 PROCEDURE — 85027 COMPLETE CBC AUTOMATED: CPT | Performed by: INTERNAL MEDICINE

## 2024-09-29 PROCEDURE — 120N000004 HC R&B MS OVERFLOW

## 2024-09-29 PROCEDURE — 99232 SBSQ HOSP IP/OBS MODERATE 35: CPT | Performed by: INTERNAL MEDICINE

## 2024-09-29 PROCEDURE — 250N000009 HC RX 250: Performed by: INTERNAL MEDICINE

## 2024-09-29 PROCEDURE — 87086 URINE CULTURE/COLONY COUNT: CPT | Performed by: INTERNAL MEDICINE

## 2024-09-29 PROCEDURE — 94660 CPAP INITIATION&MGMT: CPT

## 2024-09-29 PROCEDURE — 81001 URINALYSIS AUTO W/SCOPE: CPT | Performed by: INTERNAL MEDICINE

## 2024-09-29 PROCEDURE — 97116 GAIT TRAINING THERAPY: CPT | Mod: GP

## 2024-09-29 PROCEDURE — 36415 COLL VENOUS BLD VENIPUNCTURE: CPT | Performed by: INTERNAL MEDICINE

## 2024-09-29 PROCEDURE — 97535 SELF CARE MNGMENT TRAINING: CPT | Mod: GO

## 2024-09-29 PROCEDURE — 99222 1ST HOSP IP/OBS MODERATE 55: CPT | Performed by: INTERNAL MEDICINE

## 2024-09-29 PROCEDURE — 92610 EVALUATE SWALLOWING FUNCTION: CPT | Mod: GN

## 2024-09-29 RX ORDER — LEVOFLOXACIN 500 MG/1
500 TABLET, FILM COATED ORAL DAILY
Status: DISCONTINUED | OUTPATIENT
Start: 2024-09-29 | End: 2024-09-29

## 2024-09-29 RX ORDER — LEVOFLOXACIN 750 MG/1
750 TABLET, FILM COATED ORAL EVERY OTHER DAY
Status: DISCONTINUED | OUTPATIENT
Start: 2024-09-29 | End: 2024-09-30 | Stop reason: HOSPADM

## 2024-09-29 RX ADMIN — PIPERACILLIN AND TAZOBACTAM 3.38 G: 3; .375 INJECTION, POWDER, FOR SOLUTION INTRAVENOUS at 02:42

## 2024-09-29 RX ADMIN — INSULIN GLARGINE 10 UNITS: 100 INJECTION, SOLUTION SUBCUTANEOUS at 22:29

## 2024-09-29 RX ADMIN — PIPERACILLIN AND TAZOBACTAM 3.38 G: 3; .375 INJECTION, POWDER, FOR SOLUTION INTRAVENOUS at 11:59

## 2024-09-29 RX ADMIN — ATORVASTATIN CALCIUM 10 MG: 10 TABLET, FILM COATED ORAL at 21:52

## 2024-09-29 RX ADMIN — CARBIDOPA AND LEVODOPA 1 TABLET: 25; 100 TABLET ORAL at 13:12

## 2024-09-29 RX ADMIN — ASPIRIN 81 MG: 81 TABLET, COATED ORAL at 21:52

## 2024-09-29 RX ADMIN — METOPROLOL SUCCINATE 100 MG: 100 TABLET, EXTENDED RELEASE ORAL at 21:52

## 2024-09-29 RX ADMIN — DOXYCYCLINE 100 MG: 100 INJECTION, POWDER, LYOPHILIZED, FOR SOLUTION INTRAVENOUS at 08:05

## 2024-09-29 RX ADMIN — GUAIFENESIN AND CODEINE PHOSPHATE 10 ML: 100; 10 SOLUTION ORAL at 21:45

## 2024-09-29 RX ADMIN — ALBUTEROL SULFATE 2.5 MG: 2.5 SOLUTION RESPIRATORY (INHALATION) at 01:08

## 2024-09-29 RX ADMIN — ENOXAPARIN SODIUM 40 MG: 100 INJECTION SUBCUTANEOUS at 21:50

## 2024-09-29 RX ADMIN — CARBIDOPA AND LEVODOPA 2 TABLET: 25; 100 TABLET ORAL at 08:05

## 2024-09-29 RX ADMIN — CARBIDOPA AND LEVODOPA 1 TABLET: 25; 100 TABLET ORAL at 21:52

## 2024-09-29 RX ADMIN — LEVOFLOXACIN 750 MG: 750 TABLET, FILM COATED ORAL at 13:12

## 2024-09-29 ASSESSMENT — ACTIVITIES OF DAILY LIVING (ADL)
ADLS_ACUITY_SCORE: 29
ADLS_ACUITY_SCORE: 31
ADLS_ACUITY_SCORE: 29
ADLS_ACUITY_SCORE: 31
ADLS_ACUITY_SCORE: 29
ADLS_ACUITY_SCORE: 29
ADLS_ACUITY_SCORE: 31
ADLS_ACUITY_SCORE: 29
ADLS_ACUITY_SCORE: 31
ADLS_ACUITY_SCORE: 29
ADLS_ACUITY_SCORE: 29
ADLS_ACUITY_SCORE: 26
ADLS_ACUITY_SCORE: 29

## 2024-09-29 NOTE — PROGRESS NOTES
"Speech-Language Pathology: Clinical Swallow Evaluation      09/29/24 1130   Appointment Info   Signing Clinician's Name / Credentials (SLP) Che Barrera MA, CCC-SLP   General Information   Onset of Illness/Injury or Date of Surgery 09/28/24   Referring Physician Yunior Lancaster MD   Pertinent History of Current Problem   Per provider progress notes, \"89 year old male never smoker with a history of NHL (follicular lymphoma, large cell lymphoma of groin, B-cell nodular lymphoma of skin) treated with monitoring at [facility], CKD, dyslipidemia, HTN, nephrolithiasis, SIRIA on CPAP, DM2, osteoarthritis s/p right HANS, nonischemic cardiomyopathy s/p ICD, on carbidopa-levodopa (Parkinson disease vs other parkinsonism? Not on PMHx), presented to ED on 9/12/24 with fatigue and productive cough, treated with amoxicillin-clavulanate and azithromycin, now presents with persistent cough and fatigue though states that he feels a bit better, LLL infiltrate on CT.\"  Speech Therapy was consulted due to concern for possible dysphagia with aspiration.     General Observations Patient alert and cooperative. Wife present.   Type of Evaluation   Type of Evaluation Swallow Evaluation   Oral Motor   Oral Musculature generally intact   Structural Abnormalities none present   Mucosal Quality adequate   Dentition (Oral Motor)   Comment, Dentition (Oral Motor) Patient is missing a couple of molars. He reports that this has limited impact on his chewing ability.   Dentition (Oral Motor) natural dentition;some missing teeth   Facial Symmetry (Oral Motor)   Facial Symmetry (Oral Motor) WNL   Lip Function (Oral Motor)   Lip Range of Motion (Oral Motor) WNL   Lip Strength (Oral Motor) WNL   Comment, Lip Function (Oral Motor) Lip function was WNL   Tongue Function (Oral Motor)   Tongue Strength (Oral Motor) WNL   Tongue Coordination/Speed (Oral Motor) WNL   Tongue ROM (Oral Motor) WNL   Comment, Tongue Function (Oral Motor) Lingual " function was WNL   Jaw Function (Oral Motor)   Jaw Function (Oral Motor) WNL   Cough/Swallow/Gag Reflex (Oral Motor)   Volitional Throat Clear/Cough (Oral Motor) WNL   Volitional Swallow (Oral Motor) WNL   Vocal Quality/Secretion Management (Oral Motor)   Vocal Quality (Oral Motor) WNL   Secretion Management (Oral Motor) WNL   Comment, Vocal Quality/Secretion Management (Oral Motor) Vocal quality was clear. Patient had a fairly frequent dry cough at baseline (prior to PO intake).   General Swallowing Observations   Past History of Dysphagia None per patient/wife report. None per cursory review of EMR.   Respiratory Support room air   Current Diet/Method of Nutritional Intake (General Swallowing Observations, NIS) regular diet;thin liquids (level 0)   Swallowing Evaluation Clinical swallow evaluation   Clinical Swallow Evaluation   Feeding Assistance no assistance needed   Clinical Swallow Evaluation Textures Trialed thin liquids;solid foods   Clinical Swallow Eval: Thin Liquid Texture Trial   Mode of Presentation, Thin Liquids cup;straw;self-fed   Volume of Liquid or Food Presented >4 ounces   Oral Phase of Swallow WFL   Pharyngeal Phase of Swallow intact   Diagnostic Statement Subjectively, swallow response appeared timely. Laryngeal elevation seemed adequate on palpation. No overt clinical s/sx of aspiration observed.   Clinical Swallow Evaluation: Solid Food Texture Trial   Mode of Presentation self-fed   Volume Presented 5 bites   Oral Phase WFL   Pharyngeal Phase intact   Diagnostic Statement Patient demonstrated effective and timely mastication and good oral clearance. No overt clinical s/sx of aspiration observed.   Esophageal Phase of Swallow   Patient reports or presents with symptoms of esophageal dysphagia No   Swallowing Recommendations   Diet Consistency Recommendations regular diet;thin liquids (level 0)   Supervision Level for Intake patient independent   Mode of Delivery Recommendations bolus size,  small;slow rate of intake   Monitoring/Assistance Required (Eating/Swallowing) stop eating activities when fatigue is present   Recommended Feeding/Eating Techniques (Swallow Eval) maintain upright sitting position for eating;minimize distractions during oral intake   Medication Administration Recommendations, Swallowing (SLP) As tolerated   Instrumental Assessment Recommendations instrumental evaluation not recommended at this time  (But would be indicated if silent aspiration is a concern)   Clinical Impression   Criteria for Skilled Therapeutic Interventions Met (SLP Eval) Evaluation only   Clinical Impression Comments Clinical swallow evaluation completed per provider order. No oral dysphagia observed with the consistencies given. There was no direct evidence of pharyngeal dysphagia during this evaluation. Patient presented with a frequent, dry cough throughout session, but this did not seem to correlate with swallow. Patient appears appropriate to continue current diet of Regular textures and thin liquids with use of the standard safe swallowing precautions listed above. There is no indication for ongoing skilled Speech Therapy services at this time. Will sign off, but remain available if new concerns arise. Of note, cannot rule out silent aspiration with clinical evaluation at bedside. If this is a concern, consider instrumental evaluation (video swallow study).   SLP Total Evaluation Time   Eval: oral/pharyngeal swallow function, clinical swallow Minutes (05795) 15   SLP Discharge Planning   SLP Plan Further Speech Therapy is not indicated at this time. Will sign off and complete current order. Please re-consult if new concerns arise.   SLP Discharge Recommendation home   SLP Rationale for DC Rec Patient's swallow function appears at or near baseline.   SLP Brief overview of current status  Patient appears appropriate for diet of Regular textures and thin liquids. To maximize safety of oral intake, he should  sit fully upright (preferably in chair) for all intake, eat slowly, take small bites/sips, and chew foods thoroughly.   Total Session Time   Total Session Time (sum of timed and untimed services) 15

## 2024-09-29 NOTE — CONSULTS
PULMONARY MEDICINE CONSULT  9/29/2024    Admit Date: 9/28/2024  CODE: Full Code    Reason for Consult: recurrent pneumonia    Assessment/Plan:   89 year old male never smoker with a history of NHL (follicular lymphoma, large cell lymphoma of groin, B-cell nodular lymphoma of skin) treated with monitoring at [facility], CKD, dyslipidemia, HTN, nephrolithiasis, SIRIA on CPAP, DM2, osteoarthritis s/p right HANS, nonischemic cardiomyopathy s/p ICD, on carbidopa-levodopa (Parkinson disease vs other parkinsonism? Not on PMHx), presented to ED on 9/12/24 with fatigue and productive cough, treated with amoxicillin-clavulanate and azithromycin, now presents with persistent cough and fatigue though states that he feels a bit better, LLL infiltrate on CT.    CAP: LLL infiltrate on CT, may be residual from prior pneumonia. With age and immunosuppression from NHL could be slow to resolve clinically and radiographically from 9/12 PNA. Afebrile, procalcitonin <0.5, normal WBC, clinically stable. Will eval for dysphagia. Can consider another week of antibiotic, perhaps levofloxacin. He has been followed by [facility] in lung clinic for nodules. We can certainly get him in with an NP there in 6 weeks with repeat chest CT.    Plan:  - sputum gram stain and culture  - SLP consult to evaluate for dysphagia  - will get him into lung clinic with an NP in 6 weeks with repeat chest CT; I ordered this and sent a message to clinic schedulers  - if persistent infiltrate and cough, can consider bronchoscopy with BAL  - on room air  - SIRIA for CPAP  - will sign off but call if needed    Yunior Lancaster MD  Pulmonary and Critical Care Medicine  [facility]  Vocera  Office [phone]  he/him                                                                                                                                                      CCx: cough, abnormal chest CT    HPI: 89 year old male never smoker with a history of NHL  (follicular lymphoma, large cell lymphoma of groin, B-cell nodular lymphoma of skin) treated with monitoring at [facility], CKD, dyslipidemia, HTN, nephrolithiasis, SIRIA on CPAP, DM2, osteoarthritis s/p right HANS, nonischemic cardiomyopathy s/p ICD, on carbidopa-levodopa (Parkinson disease vs other parkinsonism? Not on PMHx), presented to ED on 9/12/24 with fatigue and productive cough, treated with amoxicillin-clavulanate and azithromycin, now presents with persistent cough and fatigue though states that he feels a bit better, LLL infiltrate on CT. Patient had fatigue and cough for a week leading up to ED visit on 9/12, went to ED and had LLL opacity on CXR, received amox-clav and azithromycin, he thinks he took these but asked me to ask his wife. He has noted ongoing fatigue, cough ongoing with some clear sputum.                                                                                                                                                      Past Medical History:  never smoker with a history of NHL (follicular lymphoma, large cell lymphoma of groin, B-cell nodular lymphoma of skin) treated with monitoring at [facility], CKD, dyslipidemia, HTN, nephrolithiasis, SIRIA on CPAP, DM2, osteoarthritis s/p right HANS, nonischemic cardiomyopathy s/p ICD, on carbidopa-levodopa (Parkinson disease vs other parkinsonism? Not on PMHx)    Past Surgical History  Past Surgical History:   Procedure Laterality Date    CARDIAC DEFIBRILLATOR PLACEMENT  07/24/2013    Dr. Rodriguez    CATARACT EXTRACTION W/ INTRAOCULAR LENS  IMPLANT, BILATERAL  05/2018    CYSTOSCOPY W/ LITHOLAPAXY / EHL N/A 03/19/2019    Procedure: CYSTOLITHOLAPAXY;  Surgeon: Yunior Moore MD;  Location: [facility];  Service: Urology    HERNIA REPAIR Left     TN CYSTO/URETERO W/LITHOTRIPSY &INDWELL STENT INSRT Right 03/19/2019    Procedure: CYSTOSCOPY RIGHT URETEROSCOPY LASER LITHOTRIPSY;  Surgeon: Yunior Moore MD;   Location: Health system OR;  Service: Urology    TOTAL HIP ARTHROPLASTY Right 2012    URETEROSCOPY         Allergies:  Allergies   Allergen Reactions    Simvastatin Other (See Comments)     Elevated CPK       PTA medications:  Medications Prior to Admission   Medication Sig Dispense Refill Last Dose    aspirin 81 MG EC tablet [ASPIRIN 81 MG EC TABLET] Take 81 mg by mouth bedtime.   9/27/2024 at HS    atorvastatin (LIPITOR) 10 MG tablet Take 10 mg by mouth at bedtime.   9/27/2024 at HS    carbidopa-levodopa (SINEMET)  MG tablet Take 1 tablet by mouth 2 times daily. Take 1 tablet midday and 1 tablet at bedtime. (Also taking 2 tablets QAM)   9/28/2024 at 1400    carbidopa-levodopa (SINEMET)  MG tablet Take 2 tablets by mouth every morning. (Also taking 1 tablet midday and 1 tablet at bedtime)   9/28/2024 at AM    LANTUS VIAL 100 UNIT/ML soln Inject 10 Units subcutaneously at bedtime.   9/27/2024 at HS    lisinopril (PRINIVIL,ZESTRIL) 2.5 MG tablet Take 2.5 mg by mouth daily.   9/28/2024 at AM    metFORMIN (GLUCOPHAGE) 500 MG tablet Take 1,000 mg by mouth 2 times daily (with meals) Take 2 pills in the A.M. and 2 pills at P.m.  2 9/28/2024 at AM    metoprolol succinate ER (TOPROL XL) 100 MG 24 hr tablet TAKE 1 TABLET AT BEDTIME 90 tablet 1 9/27/2024 at HS    CONTOUR NEXT TEST test strip USE TO TEST BLOOD SUGAR DAILY          Family Hx:  Family History   Problem Relation Age of Onset    Rheumatic Heart Disease Brother         half brother    Dementia Brother     Cerebrovascular Disease Father     Rheumatic Heart Disease Sister     Sudden Death Sister 67.00        no autopsy    CABG Sister 81.00    No Known Problems Daughter     No Known Problems Daughter     No Known Problems Daughter     No Known Problems Granddaughter     No Known Problems Grandson     Urolithiasis No family hx of     Clotting Disorder No family hx of     Gout No family hx of        Social Hx:  Social History     Socioeconomic History     Marital status:      Spouse name: Not on file    Number of children: 3    Years of education: Not on file    Highest education level: Not on file   Occupational History    Not on file   Tobacco Use    Smoking status: Never    Smokeless tobacco: Never   Vaping Use    Vaping status: Never Used   Substance and Sexual Activity    Alcohol use: No    Drug use: No    Sexual activity: Not on file   Other Topics Concern    Not on file   Social History Narrative    He used to walk for an hour most days and still takes care of his lawn and snow removal, though his grandsons are helping more with the lawn in 2018. 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 He is walking less in 2019. His wife,  Rebeka, is 6 years his tu and is providing daytime and overnight  for their daughter Loree's fraternal twins, Estrella and Jovanny, since 2019. Anthony spends 5 hours a day with Loree and the twins.     Social Determinants of Health     Financial Resource Strain: Low Risk  (9/28/2024)    Financial Resource Strain     Within the past 12 months, have you or your family members you live with been unable to get utilities (heat, electricity) when it was really needed?: No   Food Insecurity: Low Risk  (9/28/2024)    Food Insecurity     Within the past 12 months, did you worry that your food would run out before you got money to buy more?: No     Within the past 12 months, did the food you bought just not last and you didn t have money to get more?: No   Transportation Needs: Low Risk  (9/28/2024)    Transportation Needs     Within the past 12 months, has lack of transportation kept you from medical appointments, getting your medicines, non-medical meetings or appointments, work, or from getting things that you need?: No   Physical Activity: Not on file   Stress: Not on file   Social Connections: Unknown (1/1/2022)    [facility]    Social Connections     Frequency of Communication with Friends and Family: Not on file   Interpersonal Safety: Low Risk  (9/28/2024)    Interpersonal Safety     Do you feel physically and emotionally safe where you currently live?: Yes     Within the past 12 months, have you been hit, slapped, kicked or otherwise physically hurt by someone?: No     Within the past 12 months, have you been humiliated or emotionally abused in other ways by your partner or ex-partner?: No   Housing Stability: Low Risk  (9/28/2024)    Housing Stability     Do you have housing? : Yes     Are you worried about losing your housing?: No       Exam/Data:     Vitals  /58 (BP Location: Right arm)   Pulse 85   Temp 98.6  F (37  C) (Oral)   Resp 18   Ht 1.778 m (5' 10\")   Wt 77.2 kg (170 lb 3.1 oz)   SpO2 100%   BMI 24.42 kg/m    BP - Mean:  [63-81] 78  I/O last 3 completed shifts:  In: 2450 [I.V.:100; IV Piggyback:2350]  Out: -   Weight change:   [unfilled]  EXAM:  /58 (BP Location: Right arm)   Pulse 85   Temp 98.6  F (37  C) (Oral)   Resp 18   Ht 1.778 m (5' 10\")   Wt 77.2 kg (170 lb 3.1 oz)   SpO2 100%   BMI 24.42 kg/m      Intake/Output last 3 shifts:  I/O last 3 completed shifts:  In: 2450 [I.V.:100; IV Piggyback:2350]  Out: -   Intake/Output this shift:  No intake/output data recorded.    Physical Exam  Gen: alert, oriented, no distress, on CPAP  HEENT: NT, no CALEB  CV: RRR, no m/g/r  Resp: slight wheeze with cough, otherwise clear  Abd: soft, nontender, BS+  Skin: no rashes or lesions  Ext: no edema  Neuro: PERRL, nonfocal exam    ROS: A complete 10-system review of systems was obtained and is negative with the exception of what is noted in the history of present illness.    Medications:     Current Facility-Administered Medications   Medication Dose Route Frequency Provider Last Rate Last Admin     Current Facility-Administered Medications   Medication Dose Route Frequency Provider " Last Rate Last Admin    aspirin EC tablet 81 mg  81 mg Oral At Bedtime Atif Garcia DO   81 mg at 09/28/24 2203    atorvastatin (LIPITOR) tablet 10 mg  10 mg Oral At Bedtime Atif Garcia DO   10 mg at 09/28/24 2203    carbidopa-levodopa (SINEMET)  MG per tablet 1 tablet  1 tablet Oral BID Atif Garcia DO   1 tablet at 09/28/24 2203    carbidopa-levodopa (SINEMET)  MG per tablet 2 tablet  2 tablet Oral QAM Atif Garcia DO        doxycycline (VIBRAMYCIN) 100 mg vial to attach to  mL bag  100 mg Intravenous Q12H Atif Garcia DO   100 mg at 09/28/24 2037    enoxaparin ANTICOAGULANT (LOVENOX) injection 40 mg  40 mg Subcutaneous Q24H Atif Garcia DO   40 mg at 09/28/24 2043    insulin aspart (NovoLOG) injection (RAPID ACTING)  1-7 Units Subcutaneous TID AC Atif Garcia DO        insulin aspart (NovoLOG) injection (RAPID ACTING)  1-5 Units Subcutaneous At Bedtime Atif Garcia DO        insulin glargine (LANTUS PEN) injection 10 Units  10 Units Subcutaneous At Bedtime Atif Garcia DO   10 Units at 09/28/24 2203    [Held by provider] lisinopril (ZESTRIL) tablet 2.5 mg  2.5 mg Oral Daily Atif Garcia DO        [Held by provider] metFORMIN (GLUCOPHAGE) tablet 1,000 mg  1,000 mg Oral BID w/meals Atif Garcia DO        metoprolol succinate ER (TOPROL XL) 24 hr tablet 100 mg  100 mg Oral At Bedtime Atif Garcia DO        piperacillin-tazobactam (ZOSYN) 3.375 g vial to attach to  mL bag  3.375 g Intravenous Q8H Atif Garcia DO   3.375 g at 09/29/24 0242    sodium chloride (PF) 0.9% PF flush 3 mL  3 mL Intracatheter Q8H Atif Garcia,    3 mL at 09/28/24 2041         DATA  All laboratory and radiology has been personally reviewed by myself today.  Recent Labs   Lab 09/29/24 0445   WBC 6.7   HGB 10.3*   HCT 31.0*        Recent Labs   Lab 09/29/24 0445 09/28/24  1449   * 134*   CO2 15* 19*   BUN  29.6* 37.7*       Imaging:  Chest CT (9/28/24):  - images directly reviewed, formal interpretation follows:  FINDINGS:   LUNGS AND PLEURA: No effusions or pneumothorax. Patchy consolidation with nodularity at the left lower lobe. Associated bibasilar peribronchial wall thickening.     MEDIASTINUM/AXILLAE: Left chest pacer. No acute mediastinal abnormality. Small lymph nodes noted in the chest. A right paratracheal example is 6 mm in short axis, previously 2 mm series 4 image 36. A left paraesophageal example is 9 mm, previously 4 mm   image 67.     CORONARY ARTERY CALCIFICATION: Moderate.     UPPER ABDOMEN: No acute upper abdominal abnormality.     MUSCULOSKELETAL: Unremarkable.                                                                      IMPRESSION:   1.  Patchy consolidation newly seen with micronodular opacities primarily at the left lower lobe. Associated bibasilar peribronchial wall thickening. Correlate with pneumonia.  2.  Thoracic lymph nodes are small in size, but a few are slightly larger as compared to 7/15/2024.    TTE (January 2022):  The left ventricle is mildly dilated.  There is normal left ventricular wall thickness. The visual ejection fraction  is 30-40%.  Diastolic Doppler findings (E/E' ratio and/or other parameters) suggest left  ventricular filling pressures are increased.  There is severe global hypokinesia of the left ventricle.  Normal right ventricle size and systolic function.  There is a catheter/pacemaker lead seen in the right ventricle.  The left atrium is mildly dilated.  No obvious valvular disease.     When compared to previous study on 12-, there is no significant change.  Reviewed both images and compared images to imges.    Pulmonary Function Testing  none

## 2024-09-29 NOTE — CONSULTS
Care Management Initial Consult    General Information  Assessment completed with: Patient, Children, Family, Patient, Daughter Kenia,and wife Rebeka  Type of CM/SW Visit: Initial Assessment    Primary Care Provider verified and updated as needed: Yes   Readmission within the last 30 days: no previous admission in last 30 days      Reason for Consult: discharge planning  Advance Care Planning: Advance Care Planning Reviewed: no concerns identified, other (see comments) (Given Honoring Choices form)          Communication Assessment  Patient's communication style: spoken language (English or Bilingual)    Hearing Difficulty or Deaf: no   Wear Glasses or Blind: yes    Cognitive  Cognitive/Neuro/Behavioral:   WDL                     Living Environment:   People in home: spouse     Current living Arrangements: house      Able to return to prior arrangements: yes  Living Arrangement Comments: Lives with wife Rebeka    Family/Social Support:  Care provided by: self, child(donna)  Provides care for: no one  Marital Status:   Support system: Wife, Children  Rebeka       Description of Support System: Supportive, Involved    Support Assessment: Adequate family and caregiver support    Current Resources:   Patient receiving home care services: No        Community Resources: None  Equipment currently used at home: grab bar, toilet, grab bar, tub/shower, raised toilet seat, walker, rolling  Supplies currently used at home: None    Employment/Financial:  Employment Status: retired        Financial Concerns: none   Referral to Financial Worker: No       Does the patient's insurance plan have a 3 day qualifying hospital stay waiver?  Yes     Which insurance plan 3 day waiver is available? [employer]    Will the waiver be used for post-acute placement? Undetermined at this time    Lifestyle & Psychosocial Needs:  Social Determinants of Health     Food Insecurity: Not on file   Depression: Not at risk (6/24/2024)    PHQ-2      PHQ-2 Score: 0   Housing Stability: Not on file   Tobacco Use: Low Risk  (6/24/2024)    Patient History     Smoking Tobacco Use: Never     Smokeless Tobacco Use: Never     Passive Exposure: Not on file   Financial Resource Strain: High Risk (1/1/2022)    Received from [facility]    Financial Resource Strain     Difficulty of Paying Living Expenses: Not on file     Difficulty of Paying Living Expenses: Not on file   Alcohol Use: Not on file   Transportation Needs: Not on file   Physical Activity: Not on file   Interpersonal Safety: Not on file   Stress: Not on file   Social Connections: Unknown (1/1/2022)    [facility], [facility]    Social Connections     Frequency of Communication with Friends and Family: Not on file   Health Literacy: Not on file       Functional Status:  Prior to admission patient needed assistance:   Dependent ADLs:: Ambulation-walker  Dependent IADLs:: Cooking, Laundry, Meal Preparation  Assesssment of Functional Status: Not at  functional baseline    Mental Health Status:          Chemical Dependency Status:              Values/Beliefs:  Spiritual, Cultural Beliefs, Congregation Practices, Values that affect care: no               Discussed  Partnership in Safe Discharge Planning  document with patient/family: Yes: Patient and family aware of safe discharge planning and patient eager to be discharged.    Additional Information:  Lives with wife Rebeka; uses a roller walker. Has had at least six falls in last few months, per wife and daughter at bedside. Would accept home care therapy or outpatient therapy if recommended. Daughter will transport on discharge.    Next Steps: Pending Clinical progress, Pulmonary, and Therapy Consults.      MANUELITO MACHADO, RN/CM

## 2024-09-29 NOTE — PLAN OF CARE
Goal Outcome Evaluation:       No acute changes this shift. Pt remains A/O, able to make needs known. Vitals remain stable on room air. Continues to have persistent, nonproductive cough. Denies any shortness of breath. Denies any pain. Up ambulating with standby assist, tolerating activity well.     Overall Patient Progress: improving

## 2024-09-29 NOTE — PROGRESS NOTES
09/29/24 0800   Appointment Info   Signing Clinician's Name / Credentials (PT) Saurabh Horowitz, PT, DPT   Living Environment   People in Home spouse   Current Living Arrangements house   Home Accessibility stairs to enter home;stairs within home   Number of Stairs, Main Entrance 8   Stair Railings, Main Entrance   (Railing on one side)   Number of Stairs, Within Home, Primary greater than 10 stairs  (To basement for laundry, could get assist for this)   Self-Care   Usual Activity Tolerance good   Current Activity Tolerance moderate   Equipment Currently Used at Home none   Fall history within last six months yes   Number of times patient has fallen within last six months 3  (Falls have been on grass, stairs, has 4WW)   General Information   Onset of Illness/Injury or Date of Surgery 09/28/24   Referring Physician Dr. Garcia   Patient/Family Therapy Goals Statement (PT) Maintain mobility   Pertinent History of Current Problem (include personal factors and/or comorbidities that impact the POC) Pneumonia   Existing Precautions/Restrictions fall   Weight-Bearing Status - LLE full weight-bearing   Weight-Bearing Status - RLE full weight-bearing   Range of Motion (ROM)   ROM Comment WFL   Strength (Manual Muscle Testing)   Strength Comments WFL   Transfers   Transfers sit-stand transfer   Sit-Stand Transfer   Sit-Stand North (Transfers) contact guard   Assistive Device (Sit-Stand Transfers) walker, 4-wheeled   Gait/Stairs (Locomotion)   North Level (Gait) contact guard   Assistive Device (Gait) walker, 4-wheeled   Distance in Feet (Gait) 10'   Pattern (Gait) step-through   Deviations/Abnormal Patterns (Gait) festinating/shuffling;sheldon decreased;base of support, narrow   Clinical Impression   Criteria for Skilled Therapeutic Intervention Yes, treatment indicated   PT Diagnosis (PT) impaired functional mobility   Influenced by the following impairments weakness, impaired balance   Functional  limitations due to impairments gait, stairs, transfers   Clinical Presentation (PT Evaluation Complexity) stable   Clinical Presentation Rationale pt presents as medically diagnosed   Clinical Decision Making (Complexity) moderate complexity   Planned Therapy Interventions (PT) balance training;gait training;home exercise program;patient/family education;ROM (range of motion);stair training;strengthening;transfer training   Risk & Benefits of therapy have been explained care plan/treatment goals reviewed;patient   PT Total Evaluation Time   PT Eval, Moderate Complexity Minutes (69597) 10   Physical Therapy Goals   PT Frequency Daily   PT Predicted Duration/Target Date for Goal Attainment 10/04/24   PT Goals Transfers;Gait;Stairs   PT: Transfers Supervision/stand-by assist;Sit to/from stand   PT: Gait Supervision/stand-by assist;Rolling walker;Greater than 200 feet  (Or LRAD)   PT: Stairs Supervision/stand-by assist;4 stairs;Rail on left;Rail on right;Assistive device   Interventions   Interventions Quick Adds Gait Training;Therapeutic Activity   Therapeutic Activity   Therapeutic Activities: dynamic activities to improve functional performance Minutes (05609) 3   Symptoms Noted During/After Treatment None   Treatment Detail/Skilled Intervention Sit<>stand CGA, increased time for set up, no concerns in this area.   Gait Training   Gait Training Minutes (74587) 23   Symptoms Noted During/After Treatment (Gait Training) fatigue   Treatment Detail/Skilled Intervention Pt amb very slowly to 3N windows and back, standing rest break FDC, multiple short stops for pt to talk or cough, stable but takes small steps. PT managing IV, cues for navigation and safety. Does not use AD in the house but has 4WW. Educated on mobilizing with nursing as able.   Distance in Feet 200'   [place] Level (Gait Training) contact guard   Physical Assistance Level (Gait Training) supervision;verbal cues   Weight Bearing (Gait Training)  full weight-bearing   Assistive Device (Gait Training) rolling walker   Pattern Analysis (Gait Training) swing-through gait   Gait Analysis Deviations decreased sheldon;decreased step length   Impairments (Gait Analysis/Training) strength decreased   PT Discharge Planning   PT Plan Amb without AD as able, stairs, LE ther exs   PT Discharge Recommendation (DC Rec) (S)  home with assist;home with outpatient physical therapy   PT Rationale for DC Rec Has support at home, may need increased services now or in near future but mobilizing well overall with 4WW, does have h/o falls, recommend AD use and OP PT for balance/Parkinson's/gait training. If spouse/family can assist as needed with laundry/ADLs expect pt can return home.   PT Brief overview of current status CGA transfers and amb 200' with 4WW

## 2024-09-29 NOTE — PROGRESS NOTES
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 He underwent CT chest and was diagnosed with possible left lower lobe pneumonia.  Patient was evaluated on 9/12/2024 for community-acquired pneumonia and completed a course of Augmentin and azithromycin.  Respiratory PCR positive for mycoplasma pneumoniae. Awaiting finalization of other cultures, likely discharge to home on 9/30/24.      Left lower lobe nodular pneumonia  Myoplasma pneumoniae infection.  History of recent Augmentin and azithromycin use  CT chest with left lower lobe patchy consolidation and micronodular opacities in left lower lobe  .  Procalcitonin 0.27.  Lactic acid normal at 0.9.  - respiratory panel growing Mycoplasma pneumoniae.      - MRSA culture in process.   - respiratory culture in process.   - 9/28/24 Blood cultures NGTD.   - Strep pneumococcus and Legionella urine antigen negative.   - Discontinue Zosyn  - Start Levofloxacin 750 mg po every other day  - Continue doxycycline 100 mg p.o. twice daily  - SLP evaluation.   - Albuterol neb every 2 hours as needed for shortness of breath  Supplemental oxygen as needed to maintain oxygen saturation greater than 92%.  - Pulmonology clinic in 6 weeks, repeat CT chest.      Diabetes mellitus type 2  PTA medication: Metformin 1000 mg twice daily, insulin glargine 10 units subcu at bedtime  Insulin NovoLog medium correction scale 4 times daily ACHS     Essential  hypertension  Hold Lisinopril 2.5 mg daily, metoprolol 100 mg at bedtime     Nonischemic cardiomyopathy  Status post dual-chamber ICD  Continue home medication including lisinopril, metoprolol and aspirin     Chronic kidney disease stage 3a  Baseline creatinine 1.3 mg/dL  Creatinine 1.5.   Hold ACE inhibitor.   Avoid nephrotoxins.      Perkinson disease  PTA carbidopa levodopa home dose     Hyperlipidemia  Atorvastatin 10 mg at bedtime     SIRIA on CPAP  home CPAP     History of low-grade follicular lymphoma and large cell lymphoma  Status post CHOP, Rituxan treatment          Diet: Combination Diet Regular Diet Adult    DVT Prophylaxis: Enoxaparin (Lovenox) SQ  Awad Catheter: Not present  Lines: None     Cardiac Monitoring: None  Code Status: Full Code      Clinically Significant Risk Factors Present on Admission         # Hyponatremia: Lowest Na = 134 mmol/L in last 2 days, will monitor as appropriate        # Drug Induced Platelet Defect: home medication list includes an antiplatelet medication   # Hypertension: Noted on problem list    # Anemia: based on hgb <11           # Financial/Environmental Concerns: none   # ICD device present       Disposition Plan     Medically Ready for Discharge: Anticipated Today             Atif Garcia DO  Hospitalist Service  [facility]  Securely message with [employer] (more info)  Text page via [employer] Paging/Directory   ______________________________________________________________________    Interval History   Anthony is tearful today.  He denies chest pain.  Continues to have cough productive of sputum.  No new complaints.     Physical Exam   Vital Signs: Temp: 97.9  F (36.6  C) Temp src: Oral BP: 107/54 Pulse: 85   Resp: 20 SpO2: 96 % O2 Device: None (Room air)    Weight: 170 lbs 3.12 oz    Constitutional:  NAD  EYES: EOMI, PERRLA, conjunctiva clear.  HENT:  Atraumatic, neck supple.  Respiratory: Diminished breath sounds in left lower lung field,  unlabored, in no acute respiratory distress  Cardiovascular:  RRRR, good peripheral perfusion  GI: Soft, non-distended, non-tender  Musculoskeletal:  Moves all 4 extremities equally, grossly symmetrical strength  Integument: Warm & dry. No appreciable rash, erythema.  Neurologic:  Alert & oriented, speech clear and fluent, no focal deficits noted  Psych: Normal mood and affect    Medical Decision Making   40 MINUTES SPENT BY ME on the date of service doing chart review, history, exam, documentation & further activities per the note.      Data     I have personally reviewed the following data over the past 24 hrs:    6.7  \   10.3 (L)   / 186     134 (L) 107 29.6 (H) /  120 (H)   4.0 15 (L) 1.47 (H) \     Procal: 0.27 CRP: 180.00 (H) Lactic Acid: 0.9         Imaging results reviewed over the past 24 hrs:   Recent Results (from the past 24 hour(s))   Chest XR,  PA & LAT    Narrative    EXAM: XR CHEST 2 VIEWS  LOCATION: [facility]  DATE: 9/28/2024    INDICATION: PNA in left lung 3 weeks ago  COMPARISON: 9/12/2024      Impression    IMPRESSION: No consolidation, pleural effusion or pneumothorax. Stable cardiac size. Dual-lead AICD appear stable in position.   Chest CT w/o contrast    Narrative    EXAM: CT CHEST W/O CONTRAST  LOCATION: [facility]  DATE: 9/28/2024    INDICATION: cough, fever.  COMPARISON: Chest x-ray 9/28/2024, PET/CT 7/15/2024.  TECHNIQUE: CT chest without IV contrast. Multiplanar reformats were obtained. Dose reduction techniques were used.  CONTRAST: None.    FINDINGS:   LUNGS AND PLEURA: No effusions or pneumothorax. Patchy consolidation with nodularity at the left lower lobe. Associated bibasilar peribronchial wall thickening.    MEDIASTINUM/AXILLAE: Left chest pacer. No acute mediastinal abnormality. Small lymph nodes noted in the chest. A right paratracheal example is 6 mm in short axis, previously 2 mm series 4 image 36. A left paraesophageal  example is 9 mm, previously 4 mm   image 67.    CORONARY ARTERY CALCIFICATION: Moderate.    UPPER ABDOMEN: No acute upper abdominal abnormality.    MUSCULOSKELETAL: Unremarkable.      Impression    IMPRESSION:   1.  Patchy consolidation newly seen with micronodular opacities primarily at the left lower lobe. Associated bibasilar peribronchial wall thickening. Correlate with pneumonia.  2.  Thoracic lymph nodes are small in size, but a few are slightly larger as compared to 7/15/2024.

## 2024-09-29 NOTE — PROGRESS NOTES
"Speech-Language Pathology: Clinical Swallow Evaluation      09/29/24 1130   Appointment Info   Signing Clinician's Name / Credentials (SLP) Che Barrera MA, CCC-SLP   General Information   Onset of Illness/Injury or Date of Surgery 09/28/24   Referring Physician Yunior Lancaster MD   Pertinent History of Current Problem   Per provider progress notes, \"89 year old male never smoker with a history of NHL (follicular lymphoma, large cell lymphoma of groin, B-cell nodular lymphoma of skin) treated with monitoring at [facility], CKD, dyslipidemia, HTN, nephrolithiasis, SIRIA on CPAP, DM2, osteoarthritis s/p right HANS, nonischemic cardiomyopathy s/p ICD, on carbidopa-levodopa (Parkinson disease vs other parkinsonism? Not on PMHx), presented to ED on 9/12/24 with fatigue and productive cough, treated with amoxicillin-clavulanate and azithromycin, now presents with persistent cough and fatigue though states that he feels a bit better, LLL infiltrate on CT.\"  Speech Therapy was consulted due to concern for possible dysphagia with aspiration.     General Observations Patient alert and cooperative. Wife present.   Type of Evaluation   Type of Evaluation Swallow Evaluation   Oral Motor   Oral Musculature generally intact   Structural Abnormalities none present   Mucosal Quality adequate   Dentition (Oral Motor)   Comment, Dentition (Oral Motor) Patient is missing a couple of molars. He reports that this has limited impact on his chewing ability.   Dentition (Oral Motor) natural dentition;some missing teeth   Facial Symmetry (Oral Motor)   Facial Symmetry (Oral Motor) WNL   Lip Function (Oral Motor)   Lip Range of Motion (Oral Motor) WNL   Lip Strength (Oral Motor) WNL   Comment, Lip Function (Oral Motor) Lip function was WNL   Tongue Function (Oral Motor)   Tongue Strength (Oral Motor) WNL   Tongue Coordination/Speed (Oral Motor) WNL   Tongue ROM (Oral Motor) WNL   Comment, Tongue Function (Oral Motor) Lingual " function was WNL   Jaw Function (Oral Motor)   Jaw Function (Oral Motor) WNL   Cough/Swallow/Gag Reflex (Oral Motor)   Volitional Throat Clear/Cough (Oral Motor) WNL   Volitional Swallow (Oral Motor) WNL   Vocal Quality/Secretion Management (Oral Motor)   Vocal Quality (Oral Motor) WNL   Secretion Management (Oral Motor) WNL   Comment, Vocal Quality/Secretion Management (Oral Motor) Vocal quality was clear. Patient had a fairly frequent dry cough at baseline (prior to PO intake).   General Swallowing Observations   Past History of Dysphagia None per patient/wife report. None per cursory review of EMR.   Respiratory Support room air   Current Diet/Method of Nutritional Intake (General Swallowing Observations, NIS) regular diet;thin liquids (level 0)   Swallowing Evaluation Clinical swallow evaluation   Clinical Swallow Evaluation   Feeding Assistance no assistance needed   Clinical Swallow Evaluation Textures Trialed thin liquids;solid foods   Clinical Swallow Eval: Thin Liquid Texture Trial   Mode of Presentation, Thin Liquids cup;straw;self-fed   Volume of Liquid or Food Presented >4 ounces   Oral Phase of Swallow WFL   Pharyngeal Phase of Swallow intact   Diagnostic Statement Subjectively, swallow response appeared timely. Laryngeal elevation seemed adequate on palpation. No overt clinical s/sx of aspiration observed.   Clinical Swallow Evaluation: Solid Food Texture Trial   Mode of Presentation self-fed   Volume Presented 5 bites   Oral Phase WFL   Pharyngeal Phase intact   Diagnostic Statement Patient demonstrated effective and timely mastication and good oral clearance. No overt clinical s/sx of aspiration observed.   Esophageal Phase of Swallow   Patient reports or presents with symptoms of esophageal dysphagia No   Swallowing Recommendations   Diet Consistency Recommendations regular diet;thin liquids (level 0)   Supervision Level for Intake patient independent   Mode of Delivery Recommendations bolus size,  small;slow rate of intake   Monitoring/Assistance Required (Eating/Swallowing) stop eating activities when fatigue is present   Recommended Feeding/Eating Techniques (Swallow Eval) maintain upright sitting position for eating;minimize distractions during oral intake   Medication Administration Recommendations, Swallowing (SLP) As tolerated   Instrumental Assessment Recommendations instrumental evaluation not recommended at this time  (But would be indicated if silent aspiration is a concern)   Clinical Impression   Criteria for Skilled Therapeutic Interventions Met (SLP Eval) Evaluation only   Clinical Impression Comments   Clinical swallow evaluation completed per provider order. No oral dysphagia observed with the consistencies given. There was no direct evidence of pharyngeal dysphagia during this evaluation. Patient presented with a frequent, dry cough throughout session, but this did not seem to correlate with swallow. Patient appears appropriate to continue current diet of Regular textures and thin liquids with use of the standard safe swallowing precautions listed above. There is no indication for ongoing skilled Speech Therapy services at this time. Will sign off, but remain available if new concerns arise.     Of note, cannot rule out silent aspiration with clinical evaluation at bedside. While RLL consolidation/opacities would be more suspicious for aspiration pneumonia, if silent aspiration is a concern, consider instrumental evaluation (video swallow study).     SLP Total Evaluation Time   Eval: oral/pharyngeal swallow function, clinical swallow Minutes (06113) 15   SLP Discharge Planning   SLP Plan Further Speech Therapy is not indicated at this time. Will sign off and complete current order. Please re-consult if new concerns arise.   SLP Discharge Recommendation home   SLP Rationale for DC Rec Patient's swallow function appears at or near baseline.   SLP Brief overview of current status  Patient  appears appropriate for diet of Regular textures and thin liquids. To maximize safety of oral intake, he should sit fully upright (preferably in chair) for all intake, eat slowly, take small bites/sips, and chew foods thoroughly.   Total Session Time   Total Session Time (sum of timed and untimed services) 15

## 2024-09-29 NOTE — PROGRESS NOTES
"   09/29/24 1015   Appointment Info   Signing Clinician's Name / Credentials (OT) Julia Sherwood, OTR/L   Rehab Comments (OT) OT Evaluation   Living Environment   People in Home spouse   Current Living Arrangements house   Living Environment Comments Able to stay on one level at home,   Self-Care   Usual Activity Tolerance good   Current Activity Tolerance moderate   Equipment Currently Used at Home grab bar, toilet;raised toilet seat;grab bar, tub/shower   Fall history within last six months yes   Number of times patient has fallen within last six months 3   Instrumental Activities of Daily Living (IADL)   IADL Comments Pt reports I with BADLs and IADLs at baseline including meds. Recently delegating lawn care to others. Has supportive family that lives nearby and able to assist.   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 He underwent CT chest and was diagnosed with possible left lower lobe pneumonia\"   Cognitive Status Examination   Affect/Mental Status (Cognitive) WFL   Follows Commands follows one-step commands;follows two-step commands   Cognitive Status Comments Appears to have decreased safety when distraced-family arrived and lunch tray at once.Need increased cues for safety at that time w/fx mob.   Pain Assessment   Patient Currently in Pain No   Posture "   Posture not impaired   Range of Motion Comprehensive   General Range of Motion bilateral upper extremity ROM WFL   Strength Comprehensive (MMT)   Comment, General Manual Muscle Testing (MMT) Assessment generalized weakness   Bed Mobility   Comment (Bed Mobility) Min A   Transfers   Transfers bed-chair transfer;sit-stand transfer;toilet transfer;shower transfer   Transfer Skill: Bed to Chair/Chair to Bed   Transfer Comments CGA   Sit-Stand Transfer   Sit/Stand Transfer Comments CGA   Shower Transfer   Shower Transfer Comments CGA   Toilet Transfer   Toilet Transfer Comments CGA   Balance   Balance Assessment standing dynamic balance   Balance Comments CGA   Activities of Daily Living   BADL Assessment/Intervention lower body dressing;toileting;grooming   Lower Body Dressing Assessment/Training   Comment, (Lower Body Dressing) Min A   Grooming Assessment/Training   Comment, (Grooming) CGA   Toileting   Comment, (Toileting) SBA   Clinical Impression   Criteria for Skilled Therapeutic Interventions Met (OT) Yes, treatment indicated   OT Diagnosis Decreased ADL indep   OT Problem List-Impairments impacting ADL balance;flexibility;mobility;motor control;strength   Assessment of Occupational Performance 3-5 Performance Deficits   Identified Performance Deficits dressing, bathing, toileting, fx mob   Planned Therapy Interventions (OT) ADL retraining;bed mobility training;transfer training;progressive activity/exercise   Clinical Decision Making Complexity (OT) problem focused assessment/low complexity   Risk & Benefits of therapy have been explained evaluation/treatment results reviewed;care plan/treatment goals reviewed;risks/benefits reviewed;patient   OT Total Evaluation Time   OT Eval, Low Complexity Minutes (69762) 10   OT Goals   Therapy Frequency (OT) Daily   OT Predicted Duration/Target Date for Goal Attainment 10/06/24   OT Goals Hygiene/Grooming;Lower Body Dressing;Bed Mobility;Transfers   OT: Hygiene/Grooming  supervision/stand-by assist   OT: Lower Body Dressing Modified independent;using adaptive equipment   OT: Bed Mobility Supervision/stand-by assist   OT: Transfer Supervision/stand-by assist  (tub)   Interventions   Interventions Quick Adds Self-Care/Home Management   Self-Care/Home Management   Self-Care/Home Mgmt/ADL, Compensatory, Meal Prep Minutes (23108) 24   Symptoms Noted During/After Treatment (Meal Preparation/Planning Training) fatigue   Treatment Detail/Skilled Intervention Pt in chair upon arrival and agreeable to working with OT. Ed pt on tech w/AE for le dressing as pt reported difficulty at times w/L LE. Able to doff B socks and don R sock I'ly. Min A w/sock aid to don R sock. SBA to doff shorts, ed pt on option pushing off bottom in standing and then sitting to get off legs as pt noted to kick legs in standing to get off feet and feet got tangled in string. Min A w/reacher to don brief and shorts. CGA for toilet transfes w/RTS and grab bars. SBA to wash hands/brush teeth in standing. SBA w/vc/FWW to ambulate to/from bathroom. Upon return from bathroom family had arrived in room and pt's meal tray was at his chair, increased cues for safety around door and chair transfer to back up all the way to the chair, as pt was increasingly distracted with all these changes. Left pt in room w/family at bed side.   OT Discharge Planning   OT Plan AE for LE dressing, cues for safety w/FWW if continuing to use at home, tub transfer.   OT Discharge Recommendation (DC Rec) home with assist   OT Rationale for DC Rec Good family support. Pt appears mildly below baseline at this time. OT to continue support ADL independene in this level of care.   OT Brief overview of current status CGA/Min A, cues for safety   Total Session Time   Timed Code Treatment Minutes 24   Total Session Time (sum of timed and untimed services) 34

## 2024-09-29 NOTE — TREATMENT PLAN
RCAT Treatment Plan    Patient Score: 5  Patient Acuity: 5    Clinical Indication for Therapy: atelectasis    Therapy Ordered: Flutter valve TID    Assessment Summary: I am called as and administer prn neb. Maintains saturation without supplemental oxygen. BS diminished/crackles. Spontaneous wet cough noted.      Chaitanya Stoddard, RT  9/29/2024

## 2024-09-29 NOTE — PLAN OF CARE
Pt AxOx4, very pleasant. VSS and on home CPAP overnight. Pt denies pain. Ambulating with SBA. Pt endorses frequent cough, utilized ordered PRN cough suppressant. Evening Metoprolol held due SBP <120. Bed alarms utilized overnight for safety and call light within reach.     Problem: Gas Exchange Impaired  Goal: Optimal Gas Exchange  Outcome: Progressing     Problem: Pneumonia  Goal: Effective Oxygenation and Ventilation  Outcome: Progressing

## 2024-09-29 NOTE — H&P
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 He underwent CT chest and was diagnosed with possible left lower lobe pneumonia.  Patient was evaluated on 9/12/2024 for community-acquired pneumonia and completed a course of Augmentin and azithromycin    Left lower lobe nodular pneumonia  History of recent Augmentin and azithromycin use  CT chest with left lower lobe patchy consolidation and micronodular opacities in left lower lobe  .  Procalcitonin 0.27.  Lactic acid normal at 0.9.    Order MRSA culture  Order respiratory culture.   Order respiratory panel  Blood cultures in process  Order strep pneumococcus and Legionella urine antigen  Order Zosyn 3.375 mg IV every 8 hours, doxycycline 100 mg p.o. twice daily  Incentive spirometry  Albuterol neb every 2 hours as needed for shortness of breath  Supplemental oxygen as needed to maintain oxygen saturation greater than 92%.  Pulmonology consultation for readmission for left lower lobe pneumonia.    Diabetes mellitus type 2  PTA medication: Metformin 1000 mg twice daily, insulin glargine 10 units subcu at bedtime  Insulin NovoLog medium correction scale 4 times daily ACHS    Essential hypertension  Hold Lisinopril 2.5 mg daily, metoprolol 100 mg at bedtime    Nonischemic cardiomyopathy  Status post dual-chamber ICD  Continue home medication including lisinopril, metoprolol and aspirin    Chronic kidney disease stage 3a  Baseline  creatinine 1.3 mg/dL  Creatinine 1.5.   Hold ACE inhibitor.   Avoid nephrotoxins.     Perkinson disease  PTA carbidopa levodopa home dose    Hyperlipidemia  Atorvastatin 10 mg at bedtime    SIRIA on CPAP  Order home CPAP    History of low-grade follicular lymphoma and large cell lymphoma  Status post CHOP, Rituxan treatment                Diet: Combination Diet Regular Diet Adult  DVT Prophylaxis: Enoxaparin (Lovenox) SQ  Awad Catheter: Not present  Lines: None     Cardiac Monitoring: None  Code Status: Full Code    Clinically Significant Risk Factors Present on Admission         # Hyponatremia: Lowest Na = 134 mmol/L in last 2 days, will monitor as appropriate        # Drug Induced Platelet Defect: home medication list includes an antiplatelet medication   # Hypertension: Noted on problem list             # Financial/Environmental Concerns: none   # ICD device present       Disposition Plan     Medically Ready for Discharge: Anticipated in 2-4 Days           Atif Garcia DO  Hospitalist Service  [facility]  Securely message with [employer] (more info)  Text page via [employer] Paging/Directory     ______________________________________________________________________    Chief Complaint   Generalized weakness, productive cough.     History is obtained from the patient    History of Present Illness   Anthony Tiwari is a 89 year old male with history of non-Hodgkin's lymphoma, obstructive sleep apnea on CPAP, hypertension, diabetes mellitus type 2, chronic kidney disease, Parkinson's disease, who presents with productive cough, generalized weakness.  Patient was diagnosed with possible community-acquired pneumonia on 9/12 and completed course of Augmentin and azithromycin as an outpatient.  He presents to the emergency department with subjective fever, generalized weakness and productive cough.  Patient and family report that he completed course of antibiotics.    In the emergency room patient  with fever measured at 101.1  F.  Blood pressures range between 83 and 131 mmHg systolic.  At time of evaluation blood pressure was 125/56.  SpO2 normal between 93 and 96%.  Laboratory findings remarkable for creatinine of 1.54, CRP of 180.  Lactic acid normal at 0.9 and procalcitonin at 0.27.  CBC with white blood cell count of 9.6.  Hemoglobin 11.9 and normal platelet count of 244.  SARS-CoV-2 PCR influenza and RSV screen negative.  Blood culture was obtained in the emergency room.  CT chest revealed patchy consolidation with micronodular opacities in the left lower lobe.  There was also bibasilar peribronchial wall thickening.      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 Cody at [facility], getting chemo again in 2017    Nonocclusive coronary atherosclerosis of native coronary artery     nonobstuctive by cor angio      SIRIA on CPAP 2013    he sees someone at [facility]    Other primary cardiomyopathies 2013    EF was as low as 23%    Statin intolerance 02/21/2019    history of elevated CPK on both simvastatin but less so on atorvastatin.    Type 2 diabetes mellitus (H)        Past Surgical History   Past Surgical History:   Procedure Laterality Date    CARDIAC DEFIBRILLATOR PLACEMENT  07/24/2013    Dr. oRdriguez    CATARACT EXTRACTION W/ INTRAOCULAR LENS  IMPLANT, BILATERAL  05/2018    CYSTOSCOPY W/ LITHOLAPAXY / EHL N/A 03/19/2019    Procedure: CYSTOLITHOLAPAXY;  Surgeon: Yunior Moore MD;  Location: [facility];  Service: Urology    HERNIA REPAIR Left     NE CYSTO/URETERO W/LITHOTRIPSY  &INDWELL STENT INSRT Right 03/19/2019    Procedure: CYSTOSCOPY RIGHT URETEROSCOPY LASER LITHOTRIPSY;  Surgeon: Yunior Moore MD;  Location: [facility];  Service: Urology    TOTAL HIP ARTHROPLASTY Right 2012    URETEROSCOPY         Prior to Admission Medications   Prior to Admission Medications   Prescriptions Last Dose Informant Patient Reported? Taking?   CONTOUR NEXT TEST test strip   Yes No   Sig: USE TO TEST BLOOD SUGAR DAILY   LANTUS VIAL 100 UNIT/ML soln 9/27/2024 at HS  Yes Yes   Sig: Inject 10 Units subcutaneously at bedtime.   aspirin 81 MG EC tablet 9/27/2024 at HS  Yes Yes   Sig: [ASPIRIN 81 MG EC TABLET] Take 81 mg by mouth bedtime.   atorvastatin (LIPITOR) 10 MG tablet 9/27/2024 at HS  Yes Yes   Sig: Take 10 mg by mouth at bedtime.   carbidopa-levodopa (SINEMET)  MG tablet 9/28/2024 at AM  Yes Yes   Sig: Take 2 tablets by mouth every morning. (Also taking 1 tablet midday and 1 tablet at bedtime)   carbidopa-levodopa (SINEMET)  MG tablet 9/28/2024 at 1400  Yes Yes   Sig: Take 1 tablet by mouth 2 times daily. Take 1 tablet midday and 1 tablet at bedtime. (Also taking 2 tablets QAM)   lisinopril (PRINIVIL,ZESTRIL) 2.5 MG tablet 9/28/2024 at AM  Yes Yes   Sig: Take 2.5 mg by mouth daily.   metFORMIN (GLUCOPHAGE) 500 MG tablet 9/28/2024 at AM  Yes Yes   Sig: Take 1,000 mg by mouth 2 times daily (with meals) Take 2 pills in the A.M. and 2 pills at P.m.   metoprolol succinate ER (TOPROL XL) 100 MG 24 hr tablet 9/27/2024 at HS  No Yes   Sig: TAKE 1 TABLET AT BEDTIME      Facility-Administered Medications: None           Physical Exam   Vital Signs: Temp: (!) 101.1  F (38.4  C) Temp src: Oral BP: 125/56 Pulse: 66   Resp: 16 SpO2: 96 %      Weight: 170 lbs 0 oz    Constitutional:  NAD  EYES: EOMI, PERRLA, conjunctiva clear.  HENT:  Atraumatic, neck supple.  Respiratory: Diminished breath sounds in left lower lung field, unlabored, in no acute respiratory distress  Cardiovascular:  RRRR,  good peripheral perfusion  GI: Soft, non-distended, non-tender  Musculoskeletal:  Moves all 4 extremities equally, grossly symmetrical strength  Integument: Warm & dry. No appreciable rash, erythema.  Neurologic:  Alert & oriented, speech clear and fluent, no focal deficits noted  Psych: Normal mood and affect    Medical Decision Making     80 MINUTES SPENT BY ME on the date of service doing chart review, history, exam, documentation & further activities per the note.          Data     Data     I have personally reviewed the following data over the past 24 hrs:    9.6  \   11.9 (L)   / 244     134 (L) 103 37.7 (H) /  154 (H)   5.1 19 (L) 1.54 (H) \     Procal: 0.27 CRP: 180.00 (H) Lactic Acid: 0.9         Imaging results reviewed over the past 24 hrs:   Recent Results (from the past 24 hour(s))   Chest XR,  PA & LAT    Narrative    EXAM: XR CHEST 2 VIEWS  LOCATION: [facility]  DATE: 9/28/2024    INDICATION: PNA in left lung 3 weeks ago  COMPARISON: 9/12/2024      Impression    IMPRESSION: No consolidation, pleural effusion or pneumothorax. Stable cardiac size. Dual-lead AICD appear stable in position.   Chest CT w/o contrast    Narrative    EXAM: CT CHEST W/O CONTRAST  LOCATION: [facility]  DATE: 9/28/2024    INDICATION: cough, fever.  COMPARISON: Chest x-ray 9/28/2024, PET/CT 7/15/2024.  TECHNIQUE: CT chest without IV contrast. Multiplanar reformats were obtained. Dose reduction techniques were used.  CONTRAST: None.    FINDINGS:   LUNGS AND PLEURA: No effusions or pneumothorax. Patchy consolidation with nodularity at the left lower lobe. Associated bibasilar peribronchial wall thickening.    MEDIASTINUM/AXILLAE: Left chest pacer. No acute mediastinal abnormality. Small lymph nodes noted in the chest. A right paratracheal example is 6 mm in short axis, previously 2 mm series 4 image 36. A left paraesophageal example is 9 mm, previously 4 mm   image 67.    CORONARY  ARTERY CALCIFICATION: Moderate.    UPPER ABDOMEN: No acute upper abdominal abnormality.    MUSCULOSKELETAL: Unremarkable.      Impression    IMPRESSION:   1.  Patchy consolidation newly seen with micronodular opacities primarily at the left lower lobe. Associated bibasilar peribronchial wall thickening. Correlate with pneumonia.  2.  Thoracic lymph nodes are small in size, but a few are slightly larger as compared to 7/15/2024.

## 2024-09-30 ENCOUNTER — APPOINTMENT (OUTPATIENT)
Dept: PHYSICAL THERAPY | Facility: CLINIC | Age: 89
DRG: 194 | End: 2024-09-30
Payer: MEDICARE

## 2024-09-30 VITALS
HEART RATE: 59 BPM | DIASTOLIC BLOOD PRESSURE: 60 MMHG | WEIGHT: 170.19 LBS | TEMPERATURE: 98.1 F | RESPIRATION RATE: 18 BRPM | BODY MASS INDEX: 24.37 KG/M2 | OXYGEN SATURATION: 97 % | HEIGHT: 70 IN | SYSTOLIC BLOOD PRESSURE: 126 MMHG

## 2024-09-30 LAB
BACTERIA UR CULT: NORMAL
GLUCOSE BLDC GLUCOMTR-MCNC: 105 MG/DL (ref 70–99)
GLUCOSE BLDC GLUCOMTR-MCNC: 114 MG/DL (ref 70–99)

## 2024-09-30 PROCEDURE — 250N000013 HC RX MED GY IP 250 OP 250 PS 637: Performed by: INTERNAL MEDICINE

## 2024-09-30 PROCEDURE — 99239 HOSP IP/OBS DSCHRG MGMT >30: CPT | Performed by: INTERNAL MEDICINE

## 2024-09-30 PROCEDURE — 97116 GAIT TRAINING THERAPY: CPT | Mod: GP

## 2024-09-30 PROCEDURE — 97530 THERAPEUTIC ACTIVITIES: CPT | Mod: GP

## 2024-09-30 RX ORDER — DOXYCYCLINE 100 MG/1
100 CAPSULE ORAL 2 TIMES DAILY
Qty: 14 CAPSULE | Refills: 0 | Status: SHIPPED | OUTPATIENT
Start: 2024-09-30 | End: 2024-10-07

## 2024-09-30 RX ORDER — GUAIFENESIN 600 MG/1
600 TABLET, EXTENDED RELEASE ORAL 2 TIMES DAILY
Qty: 14 TABLET | Refills: 0 | Status: SHIPPED | OUTPATIENT
Start: 2024-09-30 | End: 2024-10-07

## 2024-09-30 RX ORDER — LEVOFLOXACIN 750 MG/1
750 TABLET, FILM COATED ORAL EVERY OTHER DAY
Qty: 2 TABLET | Refills: 0 | Status: SHIPPED | OUTPATIENT
Start: 2024-10-01

## 2024-09-30 RX ADMIN — CARBIDOPA AND LEVODOPA 2 TABLET: 25; 100 TABLET ORAL at 08:01

## 2024-09-30 ASSESSMENT — ACTIVITIES OF DAILY LIVING (ADL)
ADLS_ACUITY_SCORE: 31

## 2024-09-30 NOTE — PROGRESS NOTES
Care Management Follow Up    Length of Stay (days): 2    Expected Discharge Date: 09/30/2024     Concerns to be Addressed: discharge planning     Patient plan of care discussed at interdisciplinary rounds: Yes    Anticipated Discharge Disposition: Home              Anticipated Discharge Services: None  Anticipated Discharge DME: None    Patient/family educated on Medicare website which has current facility and service quality ratings:    Education Provided on the Discharge Plan: Yes  Patient/Family in Agreement with the Plan: yes    Referrals Placed by CM/SW:    Private pay costs discussed: Not applicable    Discussed  Partnership in Safe Discharge Planning  document with patient/family: No     Handoff Completed: Yes, [facility] PCP: Internal handoff referral completed    Additional Information:  PT is recommending home with assist OP PT and OT is recommending home with assist. Family to transport upon discharge.     No further care management intervention anticipated at this time.  Please re-consult if further needs arise.  Care management signing off.        Next Steps: discharge to home with family.     Alfredo Miller RN

## 2024-09-30 NOTE — PROGRESS NOTES
Occupational Therapy Discharge Summary    Reason for therapy discharge:    Discharged to home.    Progress towards therapy goal(s). See goals on Care Plan in [facility] electronic health record for goal details.  Goals not met.  Barriers to achieving goals:   discharge from facility.    Therapy recommendation(s):    No further therapy is recommended.

## 2024-09-30 NOTE — PLAN OF CARE
Problem: Gas Exchange Impaired  Goal: Optimal Gas Exchange  Outcome: Progressing     Problem: Pneumonia  Goal: Effective Oxygenation and Ventilation  Outcome: Progressing     Problem: Adult Inpatient Plan of Care  Goal: Optimal Comfort and Wellbeing  Outcome: Progressing   Goal Outcome Evaluation:       Patient denies pain. On CPAP overnight. Up SBA. Educated on call light use, call light within reach. Bed alarm in place for patient safety.

## 2024-09-30 NOTE — PLAN OF CARE
Patient Aox4, pleasant, cooperative. Denies pain. Vitally stable on room air. Discharge in formation reviewed with patient and his daughter to their satisfaction. All questions answered. Patient discharged in stable condition via a private vehicle with his daughter.

## 2024-09-30 NOTE — DISCHARGE SUMMARY
[facility]  Hospitalist Discharge Summary      Date of Admission:  9/28/2024  Date of Discharge:  9/30/2024  Discharging Provider: Atif Garcia DO  Discharge Service: Hospitalist Service    Discharge Diagnoses   Community acquired pneumonia secondary to Mycoplasma pneumoniae  Diabetes mellitus type 2  Essential hypertension  Nonischemic cardiomyopathy  Status post dual-chamber ICD  Chronic kidney disease stage 3b?  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  The following labs/tests are recommended: CRP,   CBC.    Follow up with Dr. Ferrara, [facility] lung clinic in 6 weeks with   repeat chest CT; clinic schedulers will contact for follow up appointment.            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 He underwent  CT chest and was diagnosed with possible left lower lobe pneumonia.  Patient was evaluated on 9/12/2024 for community-acquired pneumonia and completed a course of Augmentin and azithromycin.  Respiratory PCR positive for mycoplasma pneumoniae.  Pulmonology recommending outpatient follow up in 6 weeks for repeat CT chest.  Metformin and lisinopril held for elevated creatinine in CKD.    Consultations This Hospital Stay   CARE MANAGEMENT / SOCIAL WORK IP CONSULT  PHYSICAL THERAPY ADULT IP CONSULT  OCCUPATIONAL THERAPY ADULT IP CONSULT  CARE MANAGEMENT / SOCIAL WORK IP CONSULT  PULMONARY IP CONSULT  SPEECH LANGUAGE PATH ADULT IP CONSULT    Code Status   Full Code    Time Spent on this Encounter   Atif SANCHEZ DO, personally saw the patient today and spent greater than 30 minutes discharging this patient.       Atif Garcia DO  [facility]  [address] [place] [id number]  Phone: [phone]  Fax: [phone]  ______________________________________________________________________    Physical Exam   Vital Signs: Temp: 98.1  F (36.7  C) Temp src: Oral BP: 126/60 Pulse: 59   Resp: 18 SpO2: 97 % O2 Device: None (Room air)    Weight: 170 lbs 3.12 oz  Constitutional:  NAD  EYES: EOMI, PERRLA, conjunctiva clear.  HENT:  Atraumatic, neck supple.  Respiratory: Diminished breath sounds in left lower lung field, unlabored, in no acute respiratory distress  Cardiovascular:  RRRR, good peripheral perfusion  GI: Soft, non-distended, non-tender  Musculoskeletal:  Moves all 4 extremities equally, grossly symmetrical strength  Integument: Warm & dry. No appreciable rash, erythema.  Neurologic:  Alert & oriented, speech clear and fluent, no focal deficits noted  Psych: Normal mood and affect       Primary Care Physician   Alejandro Meredith    Discharge Orders      Primary Care - Care Coordination Referral      Reason for your hospital stay    Community acquired pneumonia  secondary to Mycoplasma pneumoniae     Follow-up and recommended labs and tests     Follow up with primary care provider, Alejandro Meredith, within 7 days for hospital follow- up Left lower lung community acquired pneumonia secondary to M.pneumoniae.  The following labs/tests are recommended: CRP, CBC.    Follow up with Dr. Ferrara [facility] lung clinic in 6 weeks with repeat chest CT; clinic schedulers will contact for follow up appointment.     Activity    Your activity upon discharge: activity as tolerated     When to contact your care team    Call your primary doctor if you have any of the following: temperature greater than 100.4 degrees,  increased shortness of breath, or increased pain.     Diet    Follow this diet upon discharge: Current Diet:Orders Placed This Encounter      Combination Diet Regular Diet Adult       Significant Results and Procedures   Most Recent 3 CBC's:  Recent Labs   Lab Test 09/29/24  0445 09/28/24  1449 09/12/24  1523   WBC 6.7 9.6 9.6   HGB 10.3* 11.9* 13.0*   MCV 91 89 91    244 208     Most Recent 3 BMP's:  Recent Labs   Lab Test 09/30/24  0730 09/30/24  0223 09/29/24  2116 09/29/24  0802 09/29/24  0445 09/28/24  2201 09/28/24  1449 09/12/24  1523   NA  --   --   --   --  134*  --  134* 136   POTASSIUM  --   --   --   --  4.0  --  5.1 5.2   CHLORIDE  --   --   --   --  107  --  103 103   CO2  --   --   --   --  15*  --  19* 22   BUN  --   --   --   --  29.6*  --  37.7* 31.7*   CR  --   --   --   --  1.47*  --  1.54* 1.40*   ANIONGAP  --   --   --   --  12  --  12 11   SRINI  --   --   --   --  8.1*  --  9.0 8.4*   * 105* 120*   < > 152*   < > 154* 163*    < > = values in this interval not displayed.     Most Recent 2 LFT's:  Recent Labs   Lab Test 06/04/24  1129 01/26/23  0948   AST 21 23   ALT 10 6*   ALKPHOS 91 80   BILITOTAL 1.1 1.3*     Most Recent 3 BNP's:No lab results found.  7-Day Micro Results       Collected Updated Procedure Result Status       09/29/2024 0751 09/29/2024 1414 Respiratory Aerobic Bacterial Culture with Gram Stain [[id number]]   Sputum from Expectorate    Final result Component Value   Culture >10 Squamous epithelial cells/low power field indicates oral contamination. Please recollect.   Gram Stain Result >10 Squamous epithelial cells/low power field    >25 PMNs/low power field    3+ Mixed shira               09/29/2024 0058 09/30/2024 0543 Urine Culture [[id number]]   Urine, Midstream    Final result Component Value   Culture <10,000 CFU/mL Mixture of Urogenital Shira               09/28/2024 2211 09/29/2024 1113 Respiratory Panel PCR [[id number]]    (Abnormal)   Swab from Nasopharyngeal    Final result Component Value   Adenovirus Not Detected   Coronavirus Not Detected   This test detects Coronavirus 229E, HKU1, NL63 and OC43 but does not distinguish between them. It does not detect MERS ( Respiratory Syndrome), SARS (Severe Acute Respiratory Syndrome) or 2019-nCoV (Novel 2019) Coronavirus.   Human Metapneumovirus Not Detected   Human Rhin/Enterovirus Not Detected   Influenza A Not Detected   Influenza A, H1 Not Detected   Influenza A 2009 H1N1 Not Detected   Influenza A, H3 Not Detected   Influenza B Not Detected   Parainfluenza Virus 1 Not Detected   Parainfluenza Virus 2 Not Detected   Parainfluenza Virus 3 Not Detected   Parainfluenza Virus 4 Not Detected   Respiratory Syncytial Virus A Not Detected   Respiratory Syncytial Virus B Not Detected   Chlamydia Pneumoniae Not Detected   Mycoplasma Pneumoniae Detected            09/28/2024 2211 09/30/2024 0737 MRSA Culture [[id number]]   Swab from Nose    Preliminary result Component Value   Culture Culture in progress  [P]                09/28/2024 2023 09/28/2024 2309 Legionella Urinary Antigen and Streptococcus pneumoniae antigen [[id number]]    Urine, NOS    Final result Component Value   Legionella pneumophila serogroup 1 urinary antigen Negative   A negative  result does not exclude the possibility of a Legionella infection, as it can be caused by other serogroups and species of Legionella.   Streptococcus pneumoniae antigen Negative   A negative result does not exclude a Streptococcus pneumoniae infection.   Legionella pneumophila Urinary/Strep pneumoniae Antigen Specimen Type Urine            09/28/2024 1507 09/28/2024 1552 Symptomatic Influenza A/B, RSV, & SARS-CoV2 PCR (COVID-19) Nasopharyngeal [[id number]]    Swab from Nasopharyngeal    Final result Component Value   Influenza A PCR Negative   Influenza B PCR Negative   RSV PCR Negative   SARS CoV2 PCR Negative   NEGATIVE: SARS-CoV-2 (COVID-19) RNA not detected, presumed negative.            09/28/2024 1450 09/29/2024 1806 Blood Culture Peripheral Blood [[id number]]   Peripheral Blood    Preliminary result Component Value   Culture No growth after 1 day  [P]                      Most Recent ESR & CRP:  Recent Labs   Lab Test 09/28/24  1449   CRPI 180.00*   ,   Results for orders placed or performed during the hospital encounter of 09/28/24   Chest XR,  PA & LAT    Narrative    EXAM: XR CHEST 2 VIEWS  LOCATION: [facility]  DATE: 9/28/2024    INDICATION: PNA in left lung 3 weeks ago  COMPARISON: 9/12/2024      Impression    IMPRESSION: No consolidation, pleural effusion or pneumothorax. Stable cardiac size. Dual-lead AICD appear stable in position.   Chest CT w/o contrast    Narrative    EXAM: CT CHEST W/O CONTRAST  LOCATION: [facility]  DATE: 9/28/2024    INDICATION: cough, fever.  COMPARISON: Chest x-ray 9/28/2024, PET/CT 7/15/2024.  TECHNIQUE: CT chest without IV contrast. Multiplanar reformats were obtained. Dose reduction techniques were used.  CONTRAST: None.    FINDINGS:   LUNGS AND PLEURA: No effusions or pneumothorax. Patchy consolidation with nodularity at the left lower lobe. Associated bibasilar peribronchial wall thickening.    MEDIASTINUM/AXILLAE:  Left chest pacer. No acute mediastinal abnormality. Small lymph nodes noted in the chest. A right paratracheal example is 6 mm in short axis, previously 2 mm series 4 image 36. A left paraesophageal example is 9 mm, previously 4 mm   image 67.    CORONARY ARTERY CALCIFICATION: Moderate.    UPPER ABDOMEN: No acute upper abdominal abnormality.    MUSCULOSKELETAL: Unremarkable.      Impression    IMPRESSION:   1.  Patchy consolidation newly seen with micronodular opacities primarily at the left lower lobe. Associated bibasilar peribronchial wall thickening. Correlate with pneumonia.  2.  Thoracic lymph nodes are small in size, but a few are slightly larger as compared to 7/15/2024.         Discharge Medications   Current Discharge Medication List        START taking these medications    Details   doxycycline hyclate (VIBRAMYCIN) 100 MG capsule Take 1 capsule (100 mg) by mouth 2 times daily for 7 days.  Qty: 14 capsule, Refills: 0    Associated Diagnoses: Community acquired pneumonia of left lower lobe of lung      guaiFENesin (MUCINEX) 600 MG 12 hr tablet Take 1 tablet (600 mg) by mouth 2 times daily for 7 days.  Qty: 14 tablet, Refills: 0    Associated Diagnoses: Community acquired pneumonia of left lower lobe of lung      levofloxacin (LEVAQUIN) 750 MG tablet Take 1 tablet (750 mg) by mouth every other day.  Qty: 2 tablet, Refills: 0    Associated Diagnoses: Community acquired pneumonia of left lower lobe of lung           CONTINUE these medications which have NOT CHANGED    Details   aspirin 81 MG EC tablet [ASPIRIN 81 MG EC TABLET] Take 81 mg by mouth bedtime.      atorvastatin (LIPITOR) 10 MG tablet Take 10 mg by mouth at bedtime.      !! carbidopa-levodopa (SINEMET)  MG tablet Take 1 tablet by mouth 2 times daily. Take 1 tablet midday and 1 tablet at bedtime. (Also taking 2 tablets QAM)      !! carbidopa-levodopa (SINEMET)  MG tablet Take 2 tablets by mouth every morning. (Also taking 1 tablet midday  and 1 tablet at bedtime)      LANTUS VIAL 100 UNIT/ML soln Inject 10 Units subcutaneously at bedtime.      metoprolol succinate ER (TOPROL XL) 100 MG 24 hr tablet TAKE 1 TABLET AT BEDTIME  Qty: 90 tablet, Refills: 1    Associated Diagnoses: NICM (nonischemic cardiomyopathy) (H)      CONTOUR NEXT TEST test strip USE TO TEST BLOOD SUGAR DAILY       !! - Potential duplicate medications found. Please discuss with provider.        STOP taking these medications       lisinopril (PRINIVIL,ZESTRIL) 2.5 MG tablet Comments:   Reason for Stopping:         metFORMIN (GLUCOPHAGE) 500 MG tablet Comments:   Reason for Stopping:             Allergies   Allergies   Allergen Reactions    Simvastatin Other (See Comments)     Elevated CPK

## 2024-10-02 LAB — BACTERIA SPEC CULT: NORMAL

## 2024-10-03 LAB — BACTERIA BLD CULT: NO GROWTH

## 2024-10-07 ENCOUNTER — TELEPHONE (OUTPATIENT)
Dept: CARDIOLOGY | Facility: CLINIC | Age: 89
End: 2024-10-07
Payer: MEDICARE

## 2024-10-07 NOTE — TELEPHONE ENCOUNTER
Dr Reyez, Please provide a statement that it is ok for this patient to wear a CPAP mask that has a metal snap on it, and that it wont interfere with his defibrillator.   -sea      ========  PC to patient who states his pharmacy [facility] is requiring a letter from his cardiologist stating that it is ok for him to wear a CPAP mask that has a metal snap on it. Will fwd to Dr Reyez and call with his response. No further concerns at this time.   -sea

## 2024-10-07 NOTE — TELEPHONE ENCOUNTER
Health Call Center    Phone Message    May a detailed message be left on voicemail: yes     Reason for Call: Other: Patient has a breathing machine and is needing a new mask with a snap to put over his head. He was told by [facility] that he is going to need a letter for approval from cardiology. Patient was told that since he has a defibrillator he needs an exception as it has metal and is not compatible .       Action Taken: Other: cardio    Travel Screening: Not Applicable     Date of Service:

## 2024-10-07 NOTE — TELEPHONE ENCOUNTER
Note received from Dr Reyez and patient called with update. Letter available at  for patient to  per his request.  -sea

## 2024-10-07 NOTE — LETTER
Addended by: FAB PRUITT on: 10/10/2019 10:24 AM     Modules accepted: Orders     October 7, 2024      Anthony Tiwari  [address] [facility] [facility] [address]        To Whom It May Concern,         A metal snap on a CPAP mask will not interfere with the function of a defibrillator.  It is okay for . Anthony Tiwari to obtain and wear a CPAP mask that contains a metal snap.            Sincerely,        Ad Reyez MD

## 2024-10-08 ENCOUNTER — ANCILLARY PROCEDURE (OUTPATIENT)
Dept: CARDIOLOGY | Facility: CLINIC | Age: 89
End: 2024-10-08
Attending: INTERNAL MEDICINE
Payer: MEDICARE

## 2024-10-08 ENCOUNTER — LAB REQUISITION (OUTPATIENT)
Dept: LAB | Facility: CLINIC | Age: 89
End: 2024-10-08
Payer: MEDICARE

## 2024-10-08 DIAGNOSIS — N17.9 ACUTE KIDNEY FAILURE, UNSPECIFIED (H): ICD-10-CM

## 2024-10-08 DIAGNOSIS — Z95.810 ICD (IMPLANTABLE CARDIOVERTER-DEFIBRILLATOR) IN PLACE: ICD-10-CM

## 2024-10-08 DIAGNOSIS — I25.5 ISCHEMIC CARDIOMYOPATHY: ICD-10-CM

## 2024-10-08 PROCEDURE — 80053 COMPREHEN METABOLIC PANEL: CPT | Mod: ORL | Performed by: FAMILY MEDICINE

## 2024-10-10 LAB
ALBUMIN SERPL BCG-MCNC: 3.8 G/DL (ref 3.5–5.2)
ALP SERPL-CCNC: 83 U/L (ref 40–150)
ALT SERPL W P-5'-P-CCNC: 6 U/L (ref 0–70)
ANION GAP SERPL CALCULATED.3IONS-SCNC: 14 MMOL/L (ref 7–15)
AST SERPL W P-5'-P-CCNC: 17 U/L (ref 0–45)
BILIRUB SERPL-MCNC: 1 MG/DL
BUN SERPL-MCNC: 19.9 MG/DL (ref 8–23)
CALCIUM SERPL-MCNC: 8.6 MG/DL (ref 8.8–10.4)
CHLORIDE SERPL-SCNC: 105 MMOL/L (ref 98–107)
CREAT SERPL-MCNC: 1.26 MG/DL (ref 0.67–1.17)
EGFRCR SERPLBLD CKD-EPI 2021: 55 ML/MIN/1.73M2
GLUCOSE SERPL-MCNC: 125 MG/DL (ref 70–99)
HCO3 SERPL-SCNC: 22 MMOL/L (ref 22–29)
POTASSIUM SERPL-SCNC: 4.1 MMOL/L (ref 3.4–5.3)
PROT SERPL-MCNC: 6.4 G/DL (ref 6.4–8.3)
SODIUM SERPL-SCNC: 141 MMOL/L (ref 135–145)

## 2024-10-14 LAB
MDC_IDC_EPISODE_DTM: NORMAL
MDC_IDC_EPISODE_DURATION: 10 S
MDC_IDC_EPISODE_DURATION: 11 S
MDC_IDC_EPISODE_DURATION: 12 S
MDC_IDC_EPISODE_DURATION: 137 S
MDC_IDC_EPISODE_DURATION: 156 S
MDC_IDC_EPISODE_DURATION: 16 S
MDC_IDC_EPISODE_DURATION: 16 S
MDC_IDC_EPISODE_DURATION: 17 S
MDC_IDC_EPISODE_DURATION: 18 S
MDC_IDC_EPISODE_DURATION: 19 S
MDC_IDC_EPISODE_DURATION: 191 S
MDC_IDC_EPISODE_DURATION: 2 S
MDC_IDC_EPISODE_DURATION: 20 S
MDC_IDC_EPISODE_DURATION: 204 S
MDC_IDC_EPISODE_DURATION: 21 S
MDC_IDC_EPISODE_DURATION: 37 S
MDC_IDC_EPISODE_DURATION: 38 S
MDC_IDC_EPISODE_DURATION: 38 S
MDC_IDC_EPISODE_DURATION: 41 S
MDC_IDC_EPISODE_DURATION: 48 S
MDC_IDC_EPISODE_DURATION: 5 S
MDC_IDC_EPISODE_DURATION: 61 S
MDC_IDC_EPISODE_DURATION: 7 S
MDC_IDC_EPISODE_DURATION: 75 S
MDC_IDC_EPISODE_DURATION: 8 S
MDC_IDC_EPISODE_DURATION: 9 S
MDC_IDC_EPISODE_DURATION: 94 S
MDC_IDC_EPISODE_DURATION: 99 S
MDC_IDC_EPISODE_ID: NORMAL
MDC_IDC_EPISODE_TYPE: NORMAL
MDC_IDC_EPISODE_TYPE_INDUCED: NO
MDC_IDC_EPISODE_VENDOR_TYPE: NORMAL
MDC_IDC_LEAD_CONNECTION_STATUS: NORMAL
MDC_IDC_LEAD_CONNECTION_STATUS: NORMAL
MDC_IDC_LEAD_IMPLANT_DT: NORMAL
MDC_IDC_LEAD_IMPLANT_DT: NORMAL
MDC_IDC_LEAD_LOCATION: NORMAL
MDC_IDC_LEAD_LOCATION: NORMAL
MDC_IDC_LEAD_LOCATION_DETAIL_1: NORMAL
MDC_IDC_LEAD_LOCATION_DETAIL_1: NORMAL
MDC_IDC_LEAD_MFG: NORMAL
MDC_IDC_LEAD_MFG: NORMAL
MDC_IDC_LEAD_MODEL: NORMAL
MDC_IDC_LEAD_MODEL: NORMAL
MDC_IDC_LEAD_POLARITY_TYPE: NORMAL
MDC_IDC_LEAD_POLARITY_TYPE: NORMAL
MDC_IDC_LEAD_SERIAL: NORMAL
MDC_IDC_LEAD_SERIAL: NORMAL
MDC_IDC_MSMT_BATTERY_DTM: NORMAL
MDC_IDC_MSMT_BATTERY_REMAINING_LONGEVITY: 12 MO
MDC_IDC_MSMT_BATTERY_REMAINING_PERCENTAGE: 18 %
MDC_IDC_MSMT_BATTERY_STATUS: NORMAL
MDC_IDC_MSMT_CAP_CHARGE_DTM: NORMAL
MDC_IDC_MSMT_CAP_CHARGE_DTM: NORMAL
MDC_IDC_MSMT_CAP_CHARGE_ENERGY: 17 J
MDC_IDC_MSMT_CAP_CHARGE_TIME: 13.1 S
MDC_IDC_MSMT_CAP_CHARGE_TIME: 3.1 S
MDC_IDC_MSMT_CAP_CHARGE_TYPE: NORMAL
MDC_IDC_MSMT_CAP_CHARGE_TYPE: NORMAL
MDC_IDC_MSMT_LEADCHNL_RA_IMPEDANCE_VALUE: 446 OHM
MDC_IDC_MSMT_LEADCHNL_RV_IMPEDANCE_VALUE: 465 OHM
MDC_IDC_PG_IMPLANT_DTM: NORMAL
MDC_IDC_PG_MFG: NORMAL
MDC_IDC_PG_MODEL: NORMAL
MDC_IDC_PG_SERIAL: NORMAL
MDC_IDC_PG_TYPE: NORMAL
MDC_IDC_SESS_CLINIC_NAME: NORMAL
MDC_IDC_SESS_DTM: NORMAL
MDC_IDC_SESS_TYPE: NORMAL
MDC_IDC_SET_BRADY_AT_MODE_SWITCH_MODE: NORMAL
MDC_IDC_SET_BRADY_AT_MODE_SWITCH_RATE: 170 {BEATS}/MIN
MDC_IDC_SET_BRADY_LOWRATE: 60 {BEATS}/MIN
MDC_IDC_SET_BRADY_MAX_SENSOR_RATE: 130 {BEATS}/MIN
MDC_IDC_SET_BRADY_MAX_TRACKING_RATE: 130 {BEATS}/MIN
MDC_IDC_SET_BRADY_MODE: NORMAL
MDC_IDC_SET_BRADY_PAV_DELAY_HIGH: 180 MS
MDC_IDC_SET_BRADY_PAV_DELAY_LOW: 260 MS
MDC_IDC_SET_BRADY_SAV_DELAY_HIGH: 160 MS
MDC_IDC_SET_BRADY_SAV_DELAY_LOW: 230 MS
MDC_IDC_SET_LEADCHNL_RA_PACING_AMPLITUDE: 1.8 V
MDC_IDC_SET_LEADCHNL_RA_PACING_POLARITY: NORMAL
MDC_IDC_SET_LEADCHNL_RA_PACING_PULSEWIDTH: 0.6 MS
MDC_IDC_SET_LEADCHNL_RA_SENSING_ADAPTATION_MODE: NORMAL
MDC_IDC_SET_LEADCHNL_RA_SENSING_POLARITY: NORMAL
MDC_IDC_SET_LEADCHNL_RA_SENSING_SENSITIVITY: 0.8 MV
MDC_IDC_SET_LEADCHNL_RV_PACING_AMPLITUDE: 1.5 V
MDC_IDC_SET_LEADCHNL_RV_PACING_POLARITY: NORMAL
MDC_IDC_SET_LEADCHNL_RV_PACING_PULSEWIDTH: 0.4 MS
MDC_IDC_SET_LEADCHNL_RV_SENSING_ADAPTATION_MODE: NORMAL
MDC_IDC_SET_LEADCHNL_RV_SENSING_POLARITY: NORMAL
MDC_IDC_SET_LEADCHNL_RV_SENSING_SENSITIVITY: 0.6 MV
MDC_IDC_SET_ZONE_DETECTION_INTERVAL: 300 MS
MDC_IDC_SET_ZONE_DETECTION_INTERVAL: 429 MS
MDC_IDC_SET_ZONE_STATUS: NORMAL
MDC_IDC_SET_ZONE_STATUS: NORMAL
MDC_IDC_SET_ZONE_TYPE: NORMAL
MDC_IDC_SET_ZONE_TYPE: NORMAL
MDC_IDC_SET_ZONE_VENDOR_TYPE: NORMAL
MDC_IDC_SET_ZONE_VENDOR_TYPE: NORMAL
MDC_IDC_STAT_BRADY_DTM_END: NORMAL
MDC_IDC_STAT_BRADY_DTM_START: NORMAL
MDC_IDC_STAT_BRADY_RA_PERCENT_PACED: 64 %
MDC_IDC_STAT_BRADY_RV_PERCENT_PACED: 1 %
MDC_IDC_STAT_EPISODE_RECENT_COUNT: 0
MDC_IDC_STAT_EPISODE_RECENT_COUNT: 0
MDC_IDC_STAT_EPISODE_RECENT_COUNT: 24
MDC_IDC_STAT_EPISODE_RECENT_COUNT: 467
MDC_IDC_STAT_EPISODE_RECENT_COUNT_DTM_END: NORMAL
MDC_IDC_STAT_EPISODE_RECENT_COUNT_DTM_START: NORMAL
MDC_IDC_STAT_EPISODE_TYPE: NORMAL
MDC_IDC_STAT_EPISODE_VENDOR_TYPE: NORMAL
MDC_IDC_STAT_TACHYTHERAPY_ATP_DELIVERED_RECENT: 0
MDC_IDC_STAT_TACHYTHERAPY_ATP_DELIVERED_TOTAL: 0
MDC_IDC_STAT_TACHYTHERAPY_RECENT_DTM_END: NORMAL
MDC_IDC_STAT_TACHYTHERAPY_RECENT_DTM_START: NORMAL
MDC_IDC_STAT_TACHYTHERAPY_SHOCKS_ABORTED_RECENT: 0
MDC_IDC_STAT_TACHYTHERAPY_SHOCKS_ABORTED_TOTAL: 0
MDC_IDC_STAT_TACHYTHERAPY_SHOCKS_DELIVERED_RECENT: 0
MDC_IDC_STAT_TACHYTHERAPY_SHOCKS_DELIVERED_TOTAL: 1
MDC_IDC_STAT_TACHYTHERAPY_TOTAL_DTM_END: NORMAL

## 2024-10-14 PROCEDURE — 93296 REM INTERROG EVL PM/IDS: CPT | Performed by: INTERNAL MEDICINE

## 2024-10-14 PROCEDURE — 93295 DEV INTERROG REMOTE 1/2/MLT: CPT | Performed by: INTERNAL MEDICINE

## 2024-10-22 ENCOUNTER — HOSPITAL ENCOUNTER (INPATIENT)
Facility: CLINIC | Age: 89
LOS: 2 days | Discharge: HOME | DRG: 291 | End: 2024-10-24
Attending: EMERGENCY MEDICINE | Admitting: FAMILY MEDICINE
Payer: MEDICARE

## 2024-10-22 ENCOUNTER — APPOINTMENT (OUTPATIENT)
Dept: CT IMAGING | Facility: CLINIC | Age: 89
DRG: 291 | End: 2024-10-22
Attending: EMERGENCY MEDICINE
Payer: MEDICARE

## 2024-10-22 DIAGNOSIS — I50.23 ACUTE ON CHRONIC SYSTOLIC HEART FAILURE (H): Primary | ICD-10-CM

## 2024-10-22 DIAGNOSIS — I50.9 ACUTE CONGESTIVE HEART FAILURE, UNSPECIFIED HEART FAILURE TYPE (H): ICD-10-CM

## 2024-10-22 LAB
ANION GAP SERPL CALCULATED.3IONS-SCNC: 13 MMOL/L (ref 7–15)
ATRIAL RATE - MUSE: 60 BPM
BASOPHILS # BLD AUTO: 0.1 10E3/UL (ref 0–0.2)
BASOPHILS NFR BLD AUTO: 1 %
BUN SERPL-MCNC: 21.2 MG/DL (ref 8–23)
CALCIUM SERPL-MCNC: 8.9 MG/DL (ref 8.8–10.4)
CHLORIDE SERPL-SCNC: 108 MMOL/L (ref 98–107)
CREAT SERPL-MCNC: 1.22 MG/DL (ref 0.67–1.17)
D DIMER PPP FEU-MCNC: 0.78 UG/ML FEU (ref 0–0.5)
DIASTOLIC BLOOD PRESSURE - MUSE: NORMAL MMHG
EGFRCR SERPLBLD CKD-EPI 2021: 57 ML/MIN/1.73M2
EOSINOPHIL # BLD AUTO: 0.3 10E3/UL (ref 0–0.7)
EOSINOPHIL NFR BLD AUTO: 3 %
ERYTHROCYTE [DISTWIDTH] IN BLOOD BY AUTOMATED COUNT: 13.8 % (ref 10–15)
FLUAV RNA SPEC QL NAA+PROBE: NEGATIVE
FLUBV RNA RESP QL NAA+PROBE: NEGATIVE
GLUCOSE BLDC GLUCOMTR-MCNC: 107 MG/DL (ref 70–99)
GLUCOSE SERPL-MCNC: 136 MG/DL (ref 70–99)
HCO3 SERPL-SCNC: 21 MMOL/L (ref 22–29)
HCT VFR BLD AUTO: 37.7 % (ref 40–53)
HGB BLD-MCNC: 12.3 G/DL (ref 13.3–17.7)
IMM GRANULOCYTES # BLD: 0 10E3/UL
IMM GRANULOCYTES NFR BLD: 1 %
INTERPRETATION ECG - MUSE: NORMAL
LYMPHOCYTES # BLD AUTO: 1 10E3/UL (ref 0.8–5.3)
LYMPHOCYTES NFR BLD AUTO: 13 %
MAGNESIUM SERPL-MCNC: 1.3 MG/DL (ref 1.7–2.3)
MCH RBC QN AUTO: 29.6 PG (ref 26.5–33)
MCHC RBC AUTO-ENTMCNC: 32.6 G/DL (ref 31.5–36.5)
MCV RBC AUTO: 91 FL (ref 78–100)
MONOCYTES # BLD AUTO: 0.8 10E3/UL (ref 0–1.3)
MONOCYTES NFR BLD AUTO: 10 %
NEUTROPHILS # BLD AUTO: 5.9 10E3/UL (ref 1.6–8.3)
NEUTROPHILS NFR BLD AUTO: 73 %
NRBC # BLD AUTO: 0 10E3/UL
NRBC BLD AUTO-RTO: 0 /100
NT-PROBNP SERPL-MCNC: 7532 PG/ML (ref 0–1800)
P AXIS - MUSE: 45 DEGREES
PLATELET # BLD AUTO: 305 10E3/UL (ref 150–450)
POTASSIUM SERPL-SCNC: 4.4 MMOL/L (ref 3.4–5.3)
PR INTERVAL - MUSE: 138 MS
PROCALCITONIN SERPL IA-MCNC: 0.06 NG/ML
QRS DURATION - MUSE: 134 MS
QT - MUSE: 448 MS
QTC - MUSE: 483 MS
R AXIS - MUSE: -31 DEGREES
RBC # BLD AUTO: 4.15 10E6/UL (ref 4.4–5.9)
RSV RNA SPEC NAA+PROBE: NEGATIVE
SARS-COV-2 RNA RESP QL NAA+PROBE: NEGATIVE
SODIUM SERPL-SCNC: 142 MMOL/L (ref 135–145)
SYSTOLIC BLOOD PRESSURE - MUSE: NORMAL MMHG
T AXIS - MUSE: 105 DEGREES
TROPONIN T SERPL HS-MCNC: 40 NG/L
TROPONIN T SERPL HS-MCNC: 43 NG/L
VENTRICULAR RATE- MUSE: 70 BPM
WBC # BLD AUTO: 8.2 10E3/UL (ref 4–11)

## 2024-10-22 PROCEDURE — 99223 1ST HOSP IP/OBS HIGH 75: CPT | Performed by: FAMILY MEDICINE

## 2024-10-22 PROCEDURE — 80048 BASIC METABOLIC PNL TOTAL CA: CPT | Performed by: EMERGENCY MEDICINE

## 2024-10-22 PROCEDURE — 250N000013 HC RX MED GY IP 250 OP 250 PS 637: Performed by: FAMILY MEDICINE

## 2024-10-22 PROCEDURE — 99285 EMERGENCY DEPT VISIT HI MDM: CPT | Mod: 25

## 2024-10-22 PROCEDURE — 250N000011 HC RX IP 250 OP 636: Performed by: FAMILY MEDICINE

## 2024-10-22 PROCEDURE — 84484 ASSAY OF TROPONIN QUANT: CPT | Performed by: EMERGENCY MEDICINE

## 2024-10-22 PROCEDURE — 83735 ASSAY OF MAGNESIUM: CPT | Performed by: FAMILY MEDICINE

## 2024-10-22 PROCEDURE — 250N000011 HC RX IP 250 OP 636: Performed by: EMERGENCY MEDICINE

## 2024-10-22 PROCEDURE — 96374 THER/PROPH/DIAG INJ IV PUSH: CPT | Mod: 59

## 2024-10-22 PROCEDURE — 93005 ELECTROCARDIOGRAM TRACING: CPT | Performed by: EMERGENCY MEDICINE

## 2024-10-22 PROCEDURE — 36415 COLL VENOUS BLD VENIPUNCTURE: CPT | Performed by: EMERGENCY MEDICINE

## 2024-10-22 PROCEDURE — 120N000004 HC R&B MS OVERFLOW

## 2024-10-22 PROCEDURE — 85025 COMPLETE CBC W/AUTO DIFF WBC: CPT | Performed by: EMERGENCY MEDICINE

## 2024-10-22 PROCEDURE — 85379 FIBRIN DEGRADATION QUANT: CPT | Performed by: EMERGENCY MEDICINE

## 2024-10-22 PROCEDURE — 87637 SARSCOV2&INF A&B&RSV AMP PRB: CPT | Performed by: EMERGENCY MEDICINE

## 2024-10-22 PROCEDURE — 80048 BASIC METABOLIC PNL TOTAL CA: CPT | Performed by: FAMILY MEDICINE

## 2024-10-22 PROCEDURE — 71275 CT ANGIOGRAPHY CHEST: CPT | Mod: MA

## 2024-10-22 PROCEDURE — 82962 GLUCOSE BLOOD TEST: CPT

## 2024-10-22 PROCEDURE — 84145 PROCALCITONIN (PCT): CPT | Performed by: EMERGENCY MEDICINE

## 2024-10-22 PROCEDURE — 83880 ASSAY OF NATRIURETIC PEPTIDE: CPT | Performed by: EMERGENCY MEDICINE

## 2024-10-22 RX ORDER — ACETAMINOPHEN 650 MG/1
650 SUPPOSITORY RECTAL EVERY 4 HOURS PRN
Status: DISCONTINUED | OUTPATIENT
Start: 2024-10-22 | End: 2024-10-24 | Stop reason: HOSPADM

## 2024-10-22 RX ORDER — ONDANSETRON 4 MG/1
4 TABLET, ORALLY DISINTEGRATING ORAL EVERY 6 HOURS PRN
Status: DISCONTINUED | OUTPATIENT
Start: 2024-10-22 | End: 2024-10-24 | Stop reason: HOSPADM

## 2024-10-22 RX ORDER — LISINOPRIL 10 MG/1
10 TABLET ORAL DAILY
Status: DISCONTINUED | OUTPATIENT
Start: 2024-10-23 | End: 2024-10-22

## 2024-10-22 RX ORDER — ACETAMINOPHEN 325 MG/1
650 TABLET ORAL EVERY 4 HOURS PRN
Status: DISCONTINUED | OUTPATIENT
Start: 2024-10-22 | End: 2024-10-24 | Stop reason: HOSPADM

## 2024-10-22 RX ORDER — FUROSEMIDE 10 MG/ML
40 INJECTION INTRAMUSCULAR; INTRAVENOUS EVERY 12 HOURS
Status: DISCONTINUED | OUTPATIENT
Start: 2024-10-22 | End: 2024-10-22

## 2024-10-22 RX ORDER — AMOXICILLIN 250 MG
2 CAPSULE ORAL 2 TIMES DAILY PRN
Status: DISCONTINUED | OUTPATIENT
Start: 2024-10-22 | End: 2024-10-24 | Stop reason: HOSPADM

## 2024-10-22 RX ORDER — IPRATROPIUM BROMIDE AND ALBUTEROL SULFATE 2.5; .5 MG/3ML; MG/3ML
3 SOLUTION RESPIRATORY (INHALATION)
Status: DISCONTINUED | OUTPATIENT
Start: 2024-10-23 | End: 2024-10-23

## 2024-10-22 RX ORDER — AMOXICILLIN 250 MG
1 CAPSULE ORAL 2 TIMES DAILY PRN
Status: DISCONTINUED | OUTPATIENT
Start: 2024-10-22 | End: 2024-10-24 | Stop reason: HOSPADM

## 2024-10-22 RX ORDER — ONDANSETRON 2 MG/ML
4 INJECTION INTRAMUSCULAR; INTRAVENOUS EVERY 6 HOURS PRN
Status: DISCONTINUED | OUTPATIENT
Start: 2024-10-22 | End: 2024-10-24 | Stop reason: HOSPADM

## 2024-10-22 RX ORDER — FUROSEMIDE 10 MG/ML
40 INJECTION INTRAMUSCULAR; INTRAVENOUS ONCE
Status: COMPLETED | OUTPATIENT
Start: 2024-10-22 | End: 2024-10-22

## 2024-10-22 RX ORDER — ATORVASTATIN CALCIUM 10 MG/1
10 TABLET, FILM COATED ORAL AT BEDTIME
Status: DISCONTINUED | OUTPATIENT
Start: 2024-10-22 | End: 2024-10-24 | Stop reason: HOSPADM

## 2024-10-22 RX ORDER — POLYETHYLENE GLYCOL 3350 17 G/17G
17 POWDER, FOR SOLUTION ORAL DAILY
Status: DISCONTINUED | OUTPATIENT
Start: 2024-10-23 | End: 2024-10-24 | Stop reason: HOSPADM

## 2024-10-22 RX ORDER — METOPROLOL SUCCINATE 100 MG/1
100 TABLET, EXTENDED RELEASE ORAL AT BEDTIME
Status: DISCONTINUED | OUTPATIENT
Start: 2024-10-22 | End: 2024-10-24 | Stop reason: HOSPADM

## 2024-10-22 RX ORDER — DEXTROSE MONOHYDRATE 25 G/50ML
25-50 INJECTION, SOLUTION INTRAVENOUS
Status: DISCONTINUED | OUTPATIENT
Start: 2024-10-22 | End: 2024-10-24 | Stop reason: HOSPADM

## 2024-10-22 RX ORDER — ASPIRIN 81 MG/1
81 TABLET ORAL AT BEDTIME
Status: DISCONTINUED | OUTPATIENT
Start: 2024-10-22 | End: 2024-10-24 | Stop reason: HOSPADM

## 2024-10-22 RX ORDER — IOPAMIDOL 755 MG/ML
75 INJECTION, SOLUTION INTRAVASCULAR ONCE
Status: COMPLETED | OUTPATIENT
Start: 2024-10-22 | End: 2024-10-22

## 2024-10-22 RX ORDER — HEPARIN SODIUM 5000 [USP'U]/.5ML
5000 INJECTION, SOLUTION INTRAVENOUS; SUBCUTANEOUS EVERY 12 HOURS
Status: DISCONTINUED | OUTPATIENT
Start: 2024-10-22 | End: 2024-10-24 | Stop reason: HOSPADM

## 2024-10-22 RX ORDER — NICOTINE POLACRILEX 4 MG
15-30 LOZENGE BUCCAL
Status: DISCONTINUED | OUTPATIENT
Start: 2024-10-22 | End: 2024-10-24 | Stop reason: HOSPADM

## 2024-10-22 RX ORDER — CARBIDOPA AND LEVODOPA 25; 100 MG/1; MG/1
2 TABLET ORAL EVERY MORNING
Status: DISCONTINUED | OUTPATIENT
Start: 2024-10-23 | End: 2024-10-24 | Stop reason: HOSPADM

## 2024-10-22 RX ORDER — FUROSEMIDE 10 MG/ML
40 INJECTION INTRAMUSCULAR; INTRAVENOUS EVERY 12 HOURS
Status: DISCONTINUED | OUTPATIENT
Start: 2024-10-23 | End: 2024-10-23

## 2024-10-22 RX ORDER — CARBIDOPA AND LEVODOPA 25; 100 MG/1; MG/1
1 TABLET ORAL 2 TIMES DAILY
Status: DISCONTINUED | OUTPATIENT
Start: 2024-10-22 | End: 2024-10-24 | Stop reason: HOSPADM

## 2024-10-22 RX ORDER — LISINOPRIL 2.5 MG/1
2.5 TABLET ORAL DAILY
Status: DISCONTINUED | OUTPATIENT
Start: 2024-10-23 | End: 2024-10-24 | Stop reason: HOSPADM

## 2024-10-22 RX ORDER — LIDOCAINE 40 MG/G
CREAM TOPICAL
Status: DISCONTINUED | OUTPATIENT
Start: 2024-10-22 | End: 2024-10-24 | Stop reason: HOSPADM

## 2024-10-22 RX ADMIN — ATORVASTATIN CALCIUM 10 MG: 10 TABLET, FILM COATED ORAL at 22:34

## 2024-10-22 RX ADMIN — ASPIRIN 81 MG: 81 TABLET, COATED ORAL at 22:33

## 2024-10-22 RX ADMIN — CARBIDOPA AND LEVODOPA 1 TABLET: 25; 100 TABLET ORAL at 22:34

## 2024-10-22 RX ADMIN — FUROSEMIDE 40 MG: 10 INJECTION, SOLUTION INTRAMUSCULAR; INTRAVENOUS at 21:20

## 2024-10-22 RX ADMIN — HEPARIN SODIUM 5000 UNITS: 5000 INJECTION, SOLUTION INTRAVENOUS; SUBCUTANEOUS at 22:33

## 2024-10-22 RX ADMIN — IOPAMIDOL 75 ML: 755 INJECTION, SOLUTION INTRAVENOUS at 18:33

## 2024-10-22 RX ADMIN — METOPROLOL SUCCINATE 100 MG: 25 TABLET, EXTENDED RELEASE ORAL at 22:33

## 2024-10-22 ASSESSMENT — ACTIVITIES OF DAILY LIVING (ADL)
ADLS_ACUITY_SCORE: 38

## 2024-10-22 ASSESSMENT — COLUMBIA-SUICIDE SEVERITY RATING SCALE - C-SSRS
2. HAVE YOU ACTUALLY HAD ANY THOUGHTS OF KILLING YOURSELF IN THE PAST MONTH?: NO
1. IN THE PAST MONTH, HAVE YOU WISHED YOU WERE DEAD OR WISHED YOU COULD GO TO SLEEP AND NOT WAKE UP?: NO
6. HAVE YOU EVER DONE ANYTHING, STARTED TO DO ANYTHING, OR PREPARED TO DO ANYTHING TO END YOUR LIFE?: NO

## 2024-10-22 NOTE — ED PROVIDER NOTES
EMERGENCY DEPARTMENT ENCOUNTER      NAME: Anthony Tiwari  AGE: 89 year old male  YOB: 1935  MRN: [id number]  EVALUATION DATE & TIME: 10/22/2024  5:20 PM    PCP: Alejandro Meredith    ED PROVIDER: Xavi Cabello M.D.      Chief Complaint   Patient presents with    Shortness of Breath         FINAL IMPRESSION:  1. Acute congestive heart failure, unspecified heart failure type (H)          ED COURSE & MEDICAL DECISION MAKIN year old male presents to the Emergency Department for evaluation of shortness of breath, persistent nonproductive cough.  Patient arrives to the emergency department vitally stable.  He was hospitalized for mycoplasma pneumonia about 1 month ago.  This record was reviewed.  Patient complains of persistent cough since that time. he underwent lab and imaging evaluation.  This was ultimately more suggestive of findings consistent with fluid overload including some interstitial edema and small pleural effusion on his chest CT as well as an elevated BNP.  Patient does not report chest pain.  His EKG is nonischemic and he has stable mild elevation of his high-sensitivity troponin more consistent with demand than acute coronary syndrome.  He is not currently on any diuretics.  His last echocardiogram for more than a year ago demonstrated an LVEF of 30 to 40%.  He does have intermittent desaturation episodes to the upper 80s, he is not in any significant respiratory distress and not consistently requiring supplemental oxygen.  I am going to start him on 40 mg of IV Lasix.  Nothing else in his picture is suggesting recurrent or worsening infection so far.  Discussed admission to the hospital for continuation of diuresis and monitoring of metabolic profile and respiratory status.  Did investigate possibility of transfer due to boarding crisis but patient and family were adamantly declining consideration of transfer to another facility with bed availability.  Discussed case with  hospitalist Dr. Torres.    At the conclusion of the encounter I discussed the results of all of the tests and the disposition. The questions were answered. The patient or family acknowledged understanding and was agreeable with the care plan.       Medical Decision Making  Obtained supplemental history:Supplemental history obtained?: No  Reviewed external records: External records reviewed?: No  Care impacted by chronic illness:Heart Disease  Did you consider but not order tests?: Work up considered but not performed and documented in chart, if applicable  Did you interpret images independently?: Independent interpretation of ECG and images noted in documentation, when applicable.  Consultation discussion with other provider:Did you involve another provider (consultant, , pharmacy, etc.)?: No  Admit.    MIPS: CT Pulmonary Angiogram:The patient had an abnormal d-dimer.          MEDICATIONS GIVEN IN THE EMERGENCY:  Medications   aspirin EC tablet 81 mg (81 mg Oral $Given 10/22/24 2233)   atorvastatin (LIPITOR) tablet 10 mg (10 mg Oral $Given 10/22/24 2234)   carbidopa-levodopa (SINEMET)  MG per tablet 2 tablet (has no administration in time range)   carbidopa-levodopa (SINEMET)  MG per tablet 1 tablet (1 tablet Oral $Given 10/22/24 2234)   insulin glargine (LANTUS PEN) injection 10 Units (has no administration in time range)   metoprolol succinate ER (TOPROL XL) 24 hr tablet 100 mg (100 mg Oral $Given 10/22/24 2233)   Continuing beta blocker from home medication list OR beta blocker order already placed during this visit (has no administration in time range)   lisinopril (ZESTRIL) tablet 2.5 mg (has no administration in time range)   furosemide (LASIX) injection 40 mg (has no administration in time range)   lidocaine 1 % 0.1-1 mL (has no administration in time range)   lidocaine (LMX4) cream (has no administration in time range)   sodium chloride (PF) 0.9% PF flush 3 mL (3 mLs Intracatheter $Given  10/22/24 2234)   sodium chloride (PF) 0.9% PF flush 3 mL (has no administration in time range)   senna-docusate (SENOKOT-S/PERICOLACE) 8.6-50 MG per tablet 1 tablet (has no administration in time range)     Or   senna-docusate (SENOKOT-S/PERICOLACE) 8.6-50 MG per tablet 2 tablet (has no administration in time range)   heparin ANTICOAGULANT injection 5,000 Units (5,000 Units Subcutaneous $Given 10/22/24 2233)   polyethylene glycol (MIRALAX) Packet 17 g (has no administration in time range)   acetaminophen (TYLENOL) tablet 650 mg (has no administration in time range)     Or   acetaminophen (TYLENOL) Suppository 650 mg (has no administration in time range)   ondansetron (ZOFRAN ODT) ODT tab 4 mg (has no administration in time range)     Or   ondansetron (ZOFRAN) injection 4 mg (has no administration in time range)   glucose gel 15-30 g (has no administration in time range)     Or   dextrose 50 % injection 25-50 mL (has no administration in time range)     Or   glucagon injection 1 mg (has no administration in time range)   insulin aspart (NovoLOG) injection (RAPID ACTING) (has no administration in time range)   ipratropium - albuterol 0.5 mg/2.5 mg/3 mL (DUONEB) neb solution 3 mL (has no administration in time range)   iopamidol (ISOVUE-370) solution 75 mL (75 mLs Intravenous $Given 10/22/24 1833)   furosemide (LASIX) injection 40 mg (40 mg Intravenous $Given 10/22/24 2120)       NEW PRESCRIPTIONS STARTED AT TODAY'S ER VISIT  New Prescriptions    No medications on file          =================================================================    HPI    Patient information was obtained from: Patient, Spouse        Anthony Tiwari is a 89 year old male with a pertinent history of SIRIA, lymphoma, cardiomyopathy and ICD, who presents to this ED today for evaluation of shortness of breath.  Patient complains of about 2 to 3 days of increasing dyspnea especially with exertion.  He is still able to ambulate and walked into the  emergency department today.  He says he has a persistent nagging cough since he was hospitalized for pneumonia a month ago.  He denies fever.  Denies any significant change in the cough  Which is nonproductive.   he denies leg swelling.  Denies leg pain.  Denies chest pain.  Dyspnea is worse with exertion, he is able to lay flat.      REVIEW OF SYSTEMS   All systems reviewed and negative except as noted in HPI.    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 Cody at [facility], getting chemo again in 2017    Nonocclusive coronary atherosclerosis of native coronary artery     nonobstuctive by cor angio      SIRIA on CPAP 2013    he sees someone at [facility]    Other primary cardiomyopathies 2013    EF was as low as 23%    Statin intolerance 02/21/2019    history of elevated CPK on both simvastatin but less so on atorvastatin.    Type 2 diabetes mellitus (H)        PAST SURGICAL HISTORY:  Past Surgical History:   Procedure Laterality Date    CARDIAC DEFIBRILLATOR PLACEMENT  07/24/2013    Dr. Rodriguez    CATARACT EXTRACTION W/ INTRAOCULAR LENS  IMPLANT, BILATERAL  05/2018    CYSTOSCOPY W/ LITHOLAPAXY / EHL N/A 03/19/2019    Procedure: CYSTOLITHOLAPAXY;  Surgeon: Yunior Moore MD;  Location: [facility] OR;  Service: Urology    HERNIA REPAIR Left     TN CYSTO/URETERO W/LITHOTRIPSY &INDWELL STENT INSRT Right 03/19/2019    Procedure: CYSTOSCOPY RIGHT URETEROSCOPY LASER LITHOTRIPSY;  Surgeon: Yunior Moore MD;  Location: [facility] OR;  Service: Urology    TOTAL HIP ARTHROPLASTY Right 2012     URETEROSCOPY             CURRENT MEDICATIONS:    Current Facility-Administered Medications   Medication Dose Route Frequency Provider Last Rate Last Admin    acetaminophen (TYLENOL) tablet 650 mg  650 mg Oral Q4H PRN Damari Torres MD        Or    acetaminophen (TYLENOL) Suppository 650 mg  650 mg Rectal Q4H PRN Damari Torres MD        aspirin EC tablet 81 mg  81 mg Oral At Bedtime Damari Torres MD   81 mg at 10/22/24 2233    atorvastatin (LIPITOR) tablet 10 mg  10 mg Oral At Bedtime Damari Torres MD   10 mg at 10/22/24 2234    carbidopa-levodopa (SINEMET)  MG per tablet 1 tablet  1 tablet Oral BID Damari Torres MD   1 tablet at 10/22/24 2234    [START ON 10/23/2024] carbidopa-levodopa (SINEMET)  MG per tablet 2 tablet  2 tablet Oral QAM Damari Torres MD        Continuing beta blocker from home medication list OR beta blocker order already placed during this visit   Does not apply DOES NOT GO TO Damari Castro MD        glucose gel 15-30 g  15-30 g Oral Q15 Min PRN Damari Torres MD        Or    dextrose 50 % injection 25-50 mL  25-50 mL Intravenous Q15 Min PRN Damari Torres MD        Or    glucagon injection 1 mg  1 mg Subcutaneous Q15 Min PRN Damari Torres MD        [START ON 10/23/2024] furosemide (LASIX) injection 40 mg  40 mg Intravenous Q12H Damari Torres MD        heparin ANTICOAGULANT injection 5,000 Units  5,000 Units Subcutaneous Q12H Damari Torres MD   5,000 Units at 10/22/24 2233    [START ON 10/23/2024] insulin aspart (NovoLOG) injection (RAPID ACTING)  1-7 Units Subcutaneous TID AC Damari Torres MD        insulin glargine (LANTUS PEN) injection 10 Units  10 Units Subcutaneous At Bedtime Damari Torres MD        [START ON 10/23/2024] ipratropium - albuterol 0.5 mg/2.5 mg/3 mL (DUONEB) neb solution 3 mL  3 mL Nebulization 4x daily Damari Torres MD        lidocaine (LMX4) cream   Topical Q1H PRN Damari Torres MD        lidocaine 1 % 0.1-1 mL  0.1-1 mL Other Q1H  PRN Damari Torres MD        [START ON 10/23/2024] lisinopril (ZESTRIL) tablet 2.5 mg  2.5 mg Oral Daily Damari Torres MD        metoprolol succinate ER (TOPROL XL) 24 hr tablet 100 mg  100 mg Oral At Bedtime Damari Torres MD   100 mg at 10/22/24 2233    ondansetron (ZOFRAN ODT) ODT tab 4 mg  4 mg Oral Q6H PRN Damari Torres MD        Or    ondansetron (ZOFRAN) injection 4 mg  4 mg Intravenous Q6H PRN Damari Torres MD        [START ON 10/23/2024] polyethylene glycol (MIRALAX) Packet 17 g  17 g Oral Daily Damari Torres MD        sendulce maira-docusate (SENOKOT-S/PERICOLACE) 8.6-50 MG per tablet 1 tablet  1 tablet Oral BID PRN Damari Torres MD        Or    senna-docusate (SENOKOT-S/PERICOLACE) 8.6-50 MG per tablet 2 tablet  2 tablet Oral BID PRN Damari Torres MD        sodium chloride (PF) 0.9% PF flush 3 mL  3 mL Intracatheter Q8H Damari Torres MD   3 mL at 10/22/24 2234    sodium chloride (PF) 0.9% PF flush 3 mL  3 mL Intracatheter q1 min prn Damari Torres MD         Current Outpatient Medications   Medication Sig Dispense Refill    aspirin 81 MG EC tablet [ASPIRIN 81 MG EC TABLET] Take 81 mg by mouth bedtime.      atorvastatin (LIPITOR) 10 MG tablet Take 10 mg by mouth at bedtime.      carbidopa-levodopa (SINEMET)  MG tablet Take 1 tablet by mouth 2 times daily. Take 1 tablet midday and 1 tablet at bedtime. (Also taking 2 tablets QAM)      carbidopa-levodopa (SINEMET)  MG tablet Take 2 tablets by mouth every morning. (Also taking 1 tablet midday and 1 tablet at bedtime)      LANTUS VIAL 100 UNIT/ML soln Inject 10 Units subcutaneously at bedtime.      metoprolol succinate ER (TOPROL XL) 100 MG 24 hr tablet TAKE 1 TABLET AT BEDTIME 90 tablet 1    CONTOUR NEXT TEST test strip USE TO TEST BLOOD SUGAR DAILY           ALLERGIES:  Allergies   Allergen Reactions    Simvastatin Other (See Comments)     Elevated CPK       FAMILY HISTORY:  Family History   Problem Relation Age of Onset    Rheumatic Heart  Disease Brother         half brother    Dementia Brother     Cerebrovascular Disease Father     Rheumatic Heart Disease Sister     Sudden Death Sister 67.00        no autopsy    CABG Sister 81.00    No Known Problems Daughter     No Known Problems Daughter     No Known Problems Daughter     No Known Problems Granddaughter     No Known Problems Grandson     Urolithiasis No family hx of     Clotting Disorder No family hx of     Gout No family hx of        SOCIAL HISTORY:   Social History     Socioeconomic History    Marital status:     Number of children: 3   Tobacco Use    Smoking status: Never    Smokeless tobacco: Never   Vaping Use    Vaping status: Never Used   Substance and Sexual Activity    Alcohol use: No    Drug use: No   Social History Narrative    He used to walk for an hour most days and still takes care of his lawn and snow removal, though his grandsons are helping more with the lawn in 2018. 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 He is walking less in 2019. His wife,  Rebeka, is 6 years his tu and is providing daytime and overnight  for their daughter Loree's fraternal twins, Estrella and Jovanny, since 2019. Anthony spends 5 hours a day with Loree and the twins.     Social Determinants of Health     Financial Resource Strain: Low Risk  (9/28/2024)    Financial Resource Strain     Within the past 12 months, have you or your family members you live with been unable to get utilities (heat, electricity) when it was really needed?: No   Food Insecurity: Low Risk  (9/28/2024)    Food Insecurity     Within the past 12 months, did you worry that your food would run out before you got money to buy more?: No     Within the past 12 months, did the food you bought just not last and you didn t have money to get more?: No   Transportation Needs: Low Risk  (9/28/2024)    Transportation Needs     Within the past 12 months, has lack of transportation kept you from  medical appointments, getting your medicines, non-medical meetings or appointments, work, or from getting things that you need?: No    Received from [facility] [employer] [facility], [facility] [employer] [facility]    Social Connections   Interpersonal Safety: Low Risk  (9/28/2024)    Interpersonal Safety     Do you feel physically and emotionally safe where you currently live?: Yes     Within the past 12 months, have you been hit, slapped, kicked or otherwise physically hurt by someone?: No     Within the past 12 months, have you been humiliated or emotionally abused in other ways by your partner or ex-partner?: No   Housing Stability: Low Risk  (9/28/2024)    Housing Stability     Do you have housing? : Yes     Are you worried about losing your housing?: No       VITALS:  /67   Pulse 68   Temp 97.8  F (36.6  C) (Oral)   Resp 17   Wt 77.1 kg (170 lb)   SpO2 94%   BMI 24.39 kg/m      PHYSICAL EXAM    Constitutional: Elderly male patient, laying in bed, no acute distress  HENT: Normocephalic, Atraumatic. Neck Supple.  Eyes: EOMI, Conjunctiva normal.  Respiratory: Breathing is unlabored on room air.  Speaking full sentences.  No respiratory distress.  Lung sounds are clear overall to auscultation.  Cardiovascular: Normal heart rate, Regular rhythm. No peripheral edema.  Abdomen: Soft, nontender  Musculoskeletal: Good range of motion in all major joints. No major deformities noted.  Integument: Warm, Dry.  Neurologic: Alert & awake, Normal motor function, Normal sensory function, No focal deficits noted.   Psychiatric: Cooperative. Affect appropriate.     LAB:  All pertinent labs reviewed and interpreted.  Labs Ordered and Resulted from Time of ED Arrival to Time of ED Departure   BASIC METABOLIC PANEL - Abnormal       Result Value    Sodium 142      Potassium 4.4      Chloride 108 (*)     Carbon Dioxide (CO2) 21 (*)     Anion Gap 13      Urea Nitrogen 21.2      Creatinine 1.22 (*)      GFR Estimate 57 (*)     Calcium 8.9      Glucose 136 (*)    D DIMER QUANTITATIVE - Abnormal    D-Dimer Quantitative 0.78 (*)    TROPONIN T, HIGH SENSITIVITY - Abnormal    Troponin T, High Sensitivity 40 (*)    CBC WITH PLATELETS AND DIFFERENTIAL - Abnormal    WBC Count 8.2      RBC Count 4.15 (*)     Hemoglobin 12.3 (*)     Hematocrit 37.7 (*)     MCV 91      MCH 29.6      MCHC 32.6      RDW 13.8      Platelet Count 305      % Neutrophils 73      % Lymphocytes 13      % Monocytes 10      % Eosinophils 3      % Basophils 1      % Immature Granulocytes 1      NRBCs per 100 WBC 0      Absolute Neutrophils 5.9      Absolute Lymphocytes 1.0      Absolute Monocytes 0.8      Absolute Eosinophils 0.3      Absolute Basophils 0.1      Absolute Immature Granulocytes 0.0      Absolute NRBCs 0.0     TROPONIN T, HIGH SENSITIVITY - Abnormal    Troponin T, High Sensitivity 43 (*)    NT PROBNP INPATIENT - Abnormal    N terminal Pro BNP Inpatient 7,532 (*)    GLUCOSE BY METER - Abnormal    GLUCOSE BY METER POCT 107 (*)    PROCALCITONIN - Normal    Procalcitonin 0.06     INFLUENZA A/B, RSV, & SARS-COV2 PCR - Normal    Influenza A PCR Negative      Influenza B PCR Negative      RSV PCR Negative      SARS CoV2 PCR Negative     GLUCOSE MONITOR NURSING POCT   RESPIRATORY AEROBIC BACTERIAL CULTURE       RADIOLOGY:  Reviewed all pertinent imaging. Please see official radiology report.  CT Chest Pulmonary Embolism w Contrast   Final Result   IMPRESSION:   1.  No evidence of acute pulmonary embolism within motion limitations.   2.  Findings of volume overload with small bilateral pleural effusions, right greater than left, and interstitial edema.   3.  Mild bibasilar opacities favored for atelectasis. The left lower lobe opacities could be in part related to improving infection given the previously seen reticulonodular opacities.      Echocardiogram Complete    (Results Pending)       EKG:    Performed at: 1725    Impression: atrial paced  rhythm, no acute ischemic changes    Rate: 70  Rhythm: 138  Axis: leftward  CT Interval: 138  QRS Interval: 134  QTc Interval: 483  ST Changes: Nonspecific ST abnormality  Comparison: Compared to September 12, 2024, no significant change    I have independently reviewed and interpreted the EKG(s) documented above.        Xavi Cabello M.D.  Emergency Medicine  [facility] EMERGENCY ROOM  [facility] [facility] [id number] [phone]  Dept: [phone]       Xavi Cabello MD  10/22/24 0549

## 2024-10-22 NOTE — ED TRIAGE NOTES
Pt reports worsening SOB x 1 day. Pt has pneumonia in Sept and reports lingering cough since then. Pt uses cpap at night. VSS 97% room air. Afebrile.     Pt also reports chronic intermittent abd pain, denies bowel/bladder complaints.      Triage Assessment (Adult)       Row Name 10/22/24 0231          Triage Assessment    Airway WDL WDL        Respiratory WDL    Respiratory WDL cough     Cough Type nonproductive        Skin Circulation/Temperature WDL    Skin Circulation/Temperature WDL WDL        Cardiac WDL    Cardiac WDL WDL  pacemaker        Cognitive/Neuro/Behavioral WDL    Cognitive/Neuro/Behavioral WDL WDL

## 2024-10-23 ENCOUNTER — APPOINTMENT (OUTPATIENT)
Dept: CARDIOLOGY | Facility: CLINIC | Age: 89
DRG: 291 | End: 2024-10-23
Attending: FAMILY MEDICINE
Payer: MEDICARE

## 2024-10-23 ENCOUNTER — APPOINTMENT (OUTPATIENT)
Dept: OCCUPATIONAL THERAPY | Facility: CLINIC | Age: 89
DRG: 291 | End: 2024-10-23
Attending: FAMILY MEDICINE
Payer: MEDICARE

## 2024-10-23 ENCOUNTER — TELEPHONE (OUTPATIENT)
Dept: CARDIOLOGY | Facility: CLINIC | Age: 89
End: 2024-10-23
Payer: MEDICARE

## 2024-10-23 DIAGNOSIS — I50.9 ACUTE DECOMPENSATED HEART FAILURE (H): Primary | ICD-10-CM

## 2024-10-23 LAB
ANION GAP SERPL CALCULATED.3IONS-SCNC: 13 MMOL/L (ref 7–15)
BUN SERPL-MCNC: 20.2 MG/DL (ref 8–23)
CALCIUM SERPL-MCNC: 8.4 MG/DL (ref 8.8–10.4)
CHLORIDE SERPL-SCNC: 107 MMOL/L (ref 98–107)
CREAT SERPL-MCNC: 1.18 MG/DL (ref 0.67–1.17)
EGFRCR SERPLBLD CKD-EPI 2021: 59 ML/MIN/1.73M2
GLUCOSE BLDC GLUCOMTR-MCNC: 108 MG/DL (ref 70–99)
GLUCOSE BLDC GLUCOMTR-MCNC: 123 MG/DL (ref 70–99)
GLUCOSE BLDC GLUCOMTR-MCNC: 131 MG/DL (ref 70–99)
GLUCOSE BLDC GLUCOMTR-MCNC: 135 MG/DL (ref 70–99)
GLUCOSE SERPL-MCNC: 102 MG/DL (ref 70–99)
HCO3 SERPL-SCNC: 20 MMOL/L (ref 22–29)
HOLD SPECIMEN: NORMAL
LVEF ECHO: NORMAL
MAGNESIUM SERPL-MCNC: 2.3 MG/DL (ref 1.7–2.3)
POTASSIUM SERPL-SCNC: 4 MMOL/L (ref 3.4–5.3)
POTASSIUM SERPL-SCNC: 4.3 MMOL/L (ref 3.4–5.3)
SODIUM SERPL-SCNC: 140 MMOL/L (ref 135–145)

## 2024-10-23 PROCEDURE — 250N000009 HC RX 250: Performed by: FAMILY MEDICINE

## 2024-10-23 PROCEDURE — 94640 AIRWAY INHALATION TREATMENT: CPT | Mod: 76

## 2024-10-23 PROCEDURE — 97166 OT EVAL MOD COMPLEX 45 MIN: CPT | Mod: GO

## 2024-10-23 PROCEDURE — 250N000013 HC RX MED GY IP 250 OP 250 PS 637: Performed by: FAMILY MEDICINE

## 2024-10-23 PROCEDURE — 250N000011 HC RX IP 250 OP 636: Performed by: FAMILY MEDICINE

## 2024-10-23 PROCEDURE — 255N000002 HC RX 255 OP 636: Performed by: FAMILY MEDICINE

## 2024-10-23 PROCEDURE — 120N000004 HC R&B MS OVERFLOW

## 2024-10-23 PROCEDURE — 93306 TTE W/DOPPLER COMPLETE: CPT | Mod: 26 | Performed by: INTERNAL MEDICINE

## 2024-10-23 PROCEDURE — 999N000208 ECHOCARDIOGRAM COMPLETE

## 2024-10-23 PROCEDURE — 94640 AIRWAY INHALATION TREATMENT: CPT

## 2024-10-23 PROCEDURE — 999N000157 HC STATISTIC RCP TIME EA 10 MIN

## 2024-10-23 PROCEDURE — 97535 SELF CARE MNGMENT TRAINING: CPT | Mod: GO

## 2024-10-23 PROCEDURE — 99232 SBSQ HOSP IP/OBS MODERATE 35: CPT | Performed by: HOSPITALIST

## 2024-10-23 PROCEDURE — 84132 ASSAY OF SERUM POTASSIUM: CPT | Performed by: FAMILY MEDICINE

## 2024-10-23 PROCEDURE — 82962 GLUCOSE BLOOD TEST: CPT

## 2024-10-23 PROCEDURE — 36415 COLL VENOUS BLD VENIPUNCTURE: CPT | Performed by: FAMILY MEDICINE

## 2024-10-23 PROCEDURE — 83735 ASSAY OF MAGNESIUM: CPT | Performed by: FAMILY MEDICINE

## 2024-10-23 PROCEDURE — 999N000185 HC STATISTIC TRANSPORT TIME EA 15 MIN

## 2024-10-23 PROCEDURE — 250N000012 HC RX MED GY IP 250 OP 636 PS 637: Performed by: FAMILY MEDICINE

## 2024-10-23 RX ORDER — MAGNESIUM SULFATE 4 G/50ML
4 INJECTION INTRAVENOUS ONCE
Status: COMPLETED | OUTPATIENT
Start: 2024-10-23 | End: 2024-10-23

## 2024-10-23 RX ORDER — IPRATROPIUM BROMIDE AND ALBUTEROL SULFATE 2.5; .5 MG/3ML; MG/3ML
3 SOLUTION RESPIRATORY (INHALATION) EVERY 4 HOURS PRN
Status: DISCONTINUED | OUTPATIENT
Start: 2024-10-23 | End: 2024-10-24 | Stop reason: HOSPADM

## 2024-10-23 RX ADMIN — ATORVASTATIN CALCIUM 10 MG: 10 TABLET, FILM COATED ORAL at 19:00

## 2024-10-23 RX ADMIN — CARBIDOPA AND LEVODOPA 1 TABLET: 25; 100 TABLET ORAL at 14:11

## 2024-10-23 RX ADMIN — FUROSEMIDE 40 MG: 10 INJECTION, SOLUTION INTRAMUSCULAR; INTRAVENOUS at 06:16

## 2024-10-23 RX ADMIN — ASPIRIN 81 MG: 81 TABLET, COATED ORAL at 19:00

## 2024-10-23 RX ADMIN — IPRATROPIUM BROMIDE AND ALBUTEROL SULFATE 3 ML: .5; 3 SOLUTION RESPIRATORY (INHALATION) at 09:00

## 2024-10-23 RX ADMIN — PERFLUTREN 2 ML: 6.52 INJECTION, SUSPENSION INTRAVENOUS at 07:55

## 2024-10-23 RX ADMIN — CARBIDOPA AND LEVODOPA 2 TABLET: 25; 100 TABLET ORAL at 08:44

## 2024-10-23 RX ADMIN — HEPARIN SODIUM 5000 UNITS: 5000 INJECTION, SOLUTION INTRAVENOUS; SUBCUTANEOUS at 19:01

## 2024-10-23 RX ADMIN — CARBIDOPA AND LEVODOPA 1 TABLET: 25; 100 TABLET ORAL at 19:00

## 2024-10-23 RX ADMIN — IPRATROPIUM BROMIDE AND ALBUTEROL SULFATE 3 ML: .5; 3 SOLUTION RESPIRATORY (INHALATION) at 13:51

## 2024-10-23 RX ADMIN — INSULIN GLARGINE 10 UNITS: 100 INJECTION, SOLUTION SUBCUTANEOUS at 19:02

## 2024-10-23 RX ADMIN — MAGNESIUM SULFATE HEPTAHYDRATE 4 G: 80 INJECTION, SOLUTION INTRAVENOUS at 01:21

## 2024-10-23 RX ADMIN — LISINOPRIL 2.5 MG: 2.5 TABLET ORAL at 08:44

## 2024-10-23 RX ADMIN — HEPARIN SODIUM 5000 UNITS: 5000 INJECTION, SOLUTION INTRAVENOUS; SUBCUTANEOUS at 11:42

## 2024-10-23 ASSESSMENT — ACTIVITIES OF DAILY LIVING (ADL)
ADLS_ACUITY_SCORE: 0
TOILETING_ASSISTANCE: TOILETING DIFFICULTY, ASSISTANCE 1 PERSON
DESCRIBE_HEARING_LOSS: BILATERAL HEARING LOSS
ADLS_ACUITY_SCORE: 38
TOILETING: 0-->NOT TOILET TRAINED OR ASSISTANCE NEEDED (DEVELOPMENTALLY APPROPRIATE)
ADLS_ACUITY_SCORE: 0
WALKING_OR_CLIMBING_STAIRS_DIFFICULTY: YES
WERE_AUXILIARY_AIDS_OFFERED?: NO
DIFFICULTY_EATING/SWALLOWING: YES
ADLS_ACUITY_SCORE: 0
EQUIPMENT_CURRENTLY_USED_AT_HOME: GRAB BAR, TOILET;RAISED TOILET SEAT
FALL_HISTORY_WITHIN_LAST_SIX_MONTHS: YES
THE_FOLLOWING_AIDS_WERE_PROVIDED;: PATIENT DECLINED OFFER OF AUXILIARY AIDS
DRESSING/BATHING_DIFFICULTY: NO
CHANGE_IN_FUNCTIONAL_STATUS_SINCE_ONSET_OF_CURRENT_ILLNESS/INJURY: YES
PATIENT'S_PREFERRED_MEANS_OF_COMMUNICATION: ENGLISH SPEAKER WITH HEARING LOSS, NO SPEECH PROBLEMS.
EATING/SWALLOWING: EATING
NUMBER_OF_TIMES_PATIENT_HAS_FALLEN_WITHIN_LAST_SIX_MONTHS: 1
CONCENTRATING,_REMEMBERING_OR_MAKING_DECISIONS_DIFFICULTY: NO
ADLS_ACUITY_SCORE: 0
ADLS_ACUITY_SCORE: 0
TOILETING_ISSUES: YES
ADLS_ACUITY_SCORE: 0
DOING_ERRANDS_INDEPENDENTLY_DIFFICULTY: NO
WEAR_GLASSES_OR_BLIND: YES
HEARING_DIFFICULTY_OR_DEAF: YES
TOILETING: 1-->ASSISTANCE (EQUIPMENT/PERSON) NEEDED
ADLS_ACUITY_SCORE: 0
ADLS_ACUITY_SCORE: 0
WALKING_OR_CLIMBING_STAIRS: AMBULATION DIFFICULTY, REQUIRES EQUIPMENT
DIFFICULTY_COMMUNICATING: NO
ADLS_ACUITY_SCORE: 0

## 2024-10-23 NOTE — PLAN OF CARE
A/O. VSS on RA. Denies pain. Cpap on overnight. Tele: SR with wide QRS and PVCs. Primofit on for I/O's. Call light within reach. Calls appropriately. Will continue to monitor.    Goal Outcome Evaluation:    Problem: Adult Inpatient Plan of Care  Goal: Optimal Comfort and Wellbeing  Outcome: Progressing     Problem: Heart Failure  Goal: Fluid and Electrolyte Balance  Outcome: Progressing     Problem: Heart Failure  Goal: Effective Oxygenation and Ventilation  Outcome: Progressing  Intervention: Promote Airway Secretion Clearance  Recent Flowsheet Documentation  Taken 10/23/2024 0120 by Maricarmen Mckenna, RN  Cough And Deep Breathing: done with encouragement  Activity Management: activity adjusted per tolerance  Intervention: Optimize Oxygenation and Ventilation  Recent Flowsheet Documentation  Taken 10/23/2024 0120 by Maricarmen Mckenna, RN  Head of Bed (HOB) Positioning: HOB at 20-30 degrees

## 2024-10-23 NOTE — PROGRESS NOTES
Transport of patient to  from ER. AM nebulizer was given. Currently on RA, patient states that he feels better following nebulizer. Breath sounds fine crackles bilaterally with good aeration.

## 2024-10-23 NOTE — MEDICATION SCRIBE - ADMISSION MEDICATION HISTORY
Medication Scribe Admission Medication History    Admission medication history is complete. The information provided in this note is only as accurate as the sources available at the time of the update.    Information Source(s): Patient, Clinic records, Hospital records, and [facility]person    Pertinent Information: Patient had his lisinopril and metformin put on hold due to CKD in late September 2024. Patient reported taking both of those medications 10/22/24 this morning.     Taking 2 carbidopa-levodopa  QAM, 1 midday, 1 at bedtime.     Changes made to PTA medication list:  Added: None  Deleted:   Levofloxacin 750 mg - 4 days supply, done  Changed: None    Allergies reviewed with patient and updates made in EHR: yes    Medication History Completed By: Tima Robb 10/22/2024 10:13 PM    PTA Med List   Medication Sig Last Dose    aspirin 81 MG EC tablet [ASPIRIN 81 MG EC TABLET] Take 81 mg by mouth bedtime. 10/21/2024 at HS    atorvastatin (LIPITOR) 10 MG tablet Take 10 mg by mouth at bedtime. 10/21/2024 at HS    carbidopa-levodopa (SINEMET)  MG tablet Take 1 tablet by mouth 2 times daily. Take 1 tablet midday and 1 tablet at bedtime. (Also taking 2 tablets QAM) 10/22/2024 at AM; 1 of 2    carbidopa-levodopa (SINEMET)  MG tablet Take 2 tablets by mouth every morning. (Also taking 1 tablet midday and 1 tablet at bedtime) 10/22/2024 at AM    LANTUS VIAL 100 UNIT/ML soln Inject 10 Units subcutaneously at bedtime. 10/21/2024 at HS    metoprolol succinate ER (TOPROL XL) 100 MG 24 hr tablet TAKE 1 TABLET AT BEDTIME 10/21/2024 at HS

## 2024-10-23 NOTE — PROGRESS NOTES
Pt A/O x 4. VSS on RA. Denies pain. Denies SOB or CP. Voiding spontaneously with primofit in place. Tolerating po intake. Tele SR with wide QRS and frequent PVCs.     Report given to Jenna MENDEZ. Pt transferred from ED to 3N bed 326    Jyothi Mccollum RN

## 2024-10-23 NOTE — PROGRESS NOTES
"[facility]    Medicine Progress Note - Hospitalist Service    Date of Admission:  10/22/2024    Assessment & Plan   Anthony Tiwari is a 89 year old male admitted with decompensated HFrEF.  He has a history of LV systolic dysfunction, as noted on TTE from Jan 2022.  He also has an ICD in situ.  He is close to euvolemia on exam today.  Given brisk diuresis, hold further IV diuretics today.  LV systolic function has not significantly changed from prior study.  Anticipate need for oral diuretic on discharge with close cardiology followup.      #  Decompensated HFrEF  - follow BMP  - hold further diuresis today    # DM  - basal/bolus insulin    # HTN    # Parkinsons disease    # CKD 3a    # SIRIA  - CPAP          Diet: Fluid restriction 2000 ML FLUID (and additional linked orders)  Moderate Consistent Carb (60 g CHO per Meal) Diet      Awad Catheter: Not present  Lines: None     Cardiac Monitoring: ACTIVE order. Indication: Acute decompensated heart failure (48 hours)  Code Status: Full Code      Clinically Significant Risk Factors Present on Admission          # Hyperchloremia: Highest Cl = 108 mmol/L in last 2 days, will monitor as appropriate        # Hypomagnesemia: Lowest Mg = 1.3 mg/dL in last 2 days, will replace as needed     # Drug Induced Platelet Defect: home medication list includes an antiplatelet medication   # Hypertension: Noted on problem list  # Acute heart failure with reduced ejection fraction: last echo with EF <40% and receiving IV diuretics        # Overweight: Estimated body mass index is 25.1 kg/m  as calculated from the following:    Height as of this encounter: 1.727 m (5' 8\").    Weight as of this encounter: 74.9 kg (165 lb 1.6 oz).       # Financial/Environmental Concerns:     # ICD device present       Disposition Plan     Medically Ready for Discharge: Anticipated Tomorrow             Shun Lara MD  Hospitalist Service  [facility] " Hospital  Securely message with [employer] (more info)  Text page via [facility] Paging/Directory   ______________________________________________________________________    Interval History   Denies dyspnea or dyspnea with exertion.  Denies chest pain or chest pressure.  Wife reports legs are less swollen today.    Physical Exam   Vital Signs: Temp: 97.7  F (36.5  C) Temp src: Oral BP: 105/56 Pulse: 60   Resp: 20 SpO2: 96 % O2 Device: None (Room air)    Weight: 165 lbs 1.6 oz    Gen:  sitting in chair in no extremis  Neuro:  alert, conversant  CV:  nl rate, regular rhythm  Pulm:  no acute resp distress, crackles at left base  Ext:  trace lower extremity edema    Medical Decision Making             Data   Reviewed:    K 4    TTE  Left ventricular function is decreased. The ejection fraction is 25-30%  (severely reduced).  Normal right ventricle size and systolic function.  The left atrium is moderate to severely dilated.  The right atrium is mild to moderately dilated.  There is mild (1+) aortic regurgitation.  IVC diameter <2.1 cm collapsing >50% with sniff suggests a normal RA pressure  of 3 mmHg.  The patient exhibited frequent PVCs.

## 2024-10-23 NOTE — PROGRESS NOTES
S: Anthony reports improvement in shortness of breath though has not ambulated to assess dyspnea on exertion. He does not endorse changes of lower extremity swelling yet. Reports a cough that is nonproductive though denies any chest pain. Reports intermittent diarrhea depending on what he eats but no recent changes in diarrhea.     O: Anthony is resting in bed in no acute distress. He is alert and oriented x4. He is on room air and vitally stable otherwise. Posterior lower lobes bilaterally with fine crackles. Cough does not change lung sounds and is nonproductive. Frequent PVCs on telemetry monitoring. 2+ bilateral lower extremity swelling. Abdomen is soft and non-tender with normoactive bowel sounds.     AP: Anthony has been admitted for an acute heart failure after presenting with complaints of worsening dyspnea. Anthony is clinically stable and improving with IV loop diuretics. Anthony reports improved dyspnea and remains on room air. He will be admitted to the inpatient floor and continue with IV furosemide. Increase ambulation and monitor his improvement.

## 2024-10-23 NOTE — PROGRESS NOTES
"Occupational Therapy     10/23/24 1200   Appointment Info   Signing Clinician's Name / Credentials (OT) Bridget Fink OTR/L   Living Environment   People in Home spouse   Current Living Arrangements house   Home Accessibility stairs to enter home;stairs within home   Number of Stairs, Main Entrance 8   Stair Railings, Main Entrance railing on left side (ascending)   Stair Railings, Within Home, Primary   (stairs to basement to access laundry room)   Living Environment Comments Pt can stay on one level to perform ADLs   Self-Care   Usual Activity Tolerance good   Current Activity Tolerance good   Regular Exercise No   Equipment Currently Used at Home grab bar, toilet;raised toilet seat   Fall history within last six months yes   Number of times patient has fallen within last six months   (\"3-4\")   Activity/Exercise/Self-Care Comment Ind BADLs   Instrumental Activities of Daily Living (IADL)   IADL Comments Shared household responsibilities with spouse; supportive family who live nearby and able to provide incr assist if needed.   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  No history of CHF in the past.\"   Existing Precautions/Restrictions fall   Cognitive Status Examination   Orientation Status orientation to person, place and time " "  Affect/Mental Status (Cognitive) other (see comments)   Follows Commands follows one-step commands;verbal cues/prompting required   Cognitive Status Comments Cognition does not appear at baseline currently, per family. They note that pt is \"off\" right now. Pt needed cues for safety during session.   Posture   Posture not impaired   Range of Motion Comprehensive   General Range of Motion bilateral upper extremity ROM WFL   Strength Comprehensive (MMT)   Comment, General Manual Muscle Testing (MMT) Assessment generalized weakness   Coordination   Upper Extremity Coordination No deficits were identified   Bed Mobility   Bed Mobility supine-sit   Supine-Sit [place] (Bed Mobility) contact guard   Transfers   Transfers sit-stand transfer   Sit-Stand Transfer   Sit-Stand [place] (Transfers) contact guard   Activities of Daily Living   BADL Assessment/Intervention feeding   Lower Body Dressing Assessment/Training   [place] Level (Lower Body Dressing) lower body dressing skills;pants/bottoms;minimum assist (75% patient effort)   Grooming Assessment/Training   Comment, (Grooming) per clinical judgement, pt would need CGA to perform g/h while standing at sink   [place] Level (Grooming) grooming skills;contact guard assist   Eating/Self Feeding   [place] Level (Feeding) prepare tray/open items;set up   Toileting   Comment, (Toileting) per clinical judgement   [place] Level (Toileting) toileting skills;contact guard assist   Clinical Impression   Criteria for Skilled Therapeutic Interventions Met (OT) Yes, treatment indicated   OT Diagnosis Decreased ADL indep   OT Problem List-Impairments impacting ADL problems related to;activity tolerance impaired;mobility;strength;cognition;balance   Assessment of Occupational Performance 3-5 Performance Deficits   Identified Performance Deficits dressing, bathing, fxl transfers, health condition management (CHF)   Planned Therapy Interventions (OT) ADL " "retraining;bed mobility training;cognition;strengthening;transfer training;home program guidelines;progressive activity/exercise;risk factor education   Clinical Decision Making Complexity (OT) detailed assessment/moderate complexity   Risk & Benefits of therapy have been explained evaluation/treatment results reviewed;care plan/treatment goals reviewed;patient;spouse/significant other;daughter   OT Total Evaluation Time   OT Eval, Moderate Complexity Minutes (03835) 10   OT Goals   Therapy Frequency (OT) Daily   OT Predicted Duration/Target Date for Goal Attainment 10/30/24   OT Goals Cardiac Phase 1   OT: Hygiene/Grooming modified independent;while standing   OT: Lower Body Dressing Modified independent   OT: Understanding of cardiac education to maximize quality of life, condition management, and health outcomes Patient;Verbalize   OT: Perform aerobic activity with stable cardiovascular response continuous;15 minutes   Interventions   Interventions Quick Adds Cardiac Rehab   Self-Care/Home Management   Self-Care/Home Mgmt/ADL, Compensatory, Meal Prep Minutes (59974) 25   Symptoms Noted During/After Treatment (Meal Preparation/Planning Training) none   Treatment Detail/Skilled Intervention Eval complete, treatment initiated. Pt supine in bed upon arrival and trying to get up to sit EOB- cues for safety and for line awareness prior to moving to EOB. Completed supine>sit SBA. Pt completed LB dressing, SBA to thread BLEs and CGA while pt stood to pull brief up. Pt completed sit<>stand transfer from EOB, CGA w/o AD. Fxl mob in room approx 50' SBA/CGA w/o AD and cues for safety. CHF education and packet issued- highlighted diet/exercise, weight chart, and stoplight tool \"How I Manage my Heart Failure.\" Pt and family verbalize understanding.   Ambulation   Duration (minutes) 8 minutes   Symptoms Denies symptoms   Cardiovascular Response Normal   Exercise Details Pt tolerated fxl ambulation in room, approx 50' w/o AD, " SBA/CGA.   Vital Signs Details Prior to exercise: Oxygen- 96, HR 71, BP- 116/59. After exercise, Oxygen- 95, HR- 69, /56   OT Discharge Planning   OT Plan CHF (new)- review educ, UB HEP (calisthenics handout left in room), LE dressing, fxl transfers, toileting, monitor cog/formal assessment as appropriate   OT Discharge Recommendation (DC Rec) home with assist   OT Rationale for DC Rec Pt has good support system. Ind at baseline; currently slightly below ADL baseline. Rec incr assist from family with IADLs, as needed.   OT Brief overview of current status SBA/CGA, tolerated approx 10 mins fxl ambulation with stable cardiovascular response   Total Session Time   Timed Code Treatment Minutes 25   Total Session Time (sum of timed and untimed services) 35   Bridget Fink, OTR/L 10/23/2024

## 2024-10-23 NOTE — PROGRESS NOTES
RCAT Treatment Plan    Patient Score: 3   Patient Acuity: 5    Clinical Indication for Therapy: CHF    Therapy Ordered: Fang Q4 PRN    Assessment Summary: BS were clear when seen. Pt is on RA, spo2 98%. No hx of copd/asthma. Per pt, never smoked. Pt has a dry non productive cough. Per RCAT score, making duoneb q4 prn    Misha Dickson, RT  10/23/2024       10/23/24 1300   RCAT Assessment   Reason for Assessment CHF   Pulmonary Status 0   Surgical Status 0   Chest X-ray 2   Respiratory Pattern 0   Mental Status 0   Breath Sounds 0   Cough Effectiveness 1   Level of Activity 0   O2 Required for SpO2>=92% 0   Acuity Level (points) 3   Acuity Level  5

## 2024-10-23 NOTE — PLAN OF CARE
Goal Outcome Evaluation:      Plan of Care Reviewed With: patient, spouse, child          Problem: Adult Inpatient Plan of Care  Goal: Plan of Care Review  Description: The Plan of Care Review/Shift note should be completed every shift.  The Outcome Evaluation is a brief statement about your assessment that the patient is improving, declining, or no change.  This information will be displayed automatically on your shift  note.  Outcome: Progressing  Flowsheets (Taken 10/23/2024 1347)  Plan of Care Reviewed With:   patient   spouse   child     Problem: Adult Inpatient Plan of Care  Goal: Absence of Hospital-Acquired Illness or Injury  Intervention: Identify and Manage Fall Risk  Recent Flowsheet Documentation  Taken 10/23/2024 1022 by Jenna Vidal, RN  Safety Promotion/Fall Prevention:   safety round/check completed   assistive device/personal items within reach  Taken 10/23/2024 1014 by Jenna Vidal, RN  Safety Promotion/Fall Prevention: safety round/check completed     Problem: Adult Inpatient Plan of Care  Goal: Absence of Hospital-Acquired Illness or Injury  Intervention: Prevent and Manage VTE (Venous Thromboembolism) Risk  Recent Flowsheet Documentation  Taken 10/23/2024 1018 by Jenna Vidal, RN  VTE Prevention/Management:   SCDs off (sequential compression devices)   patient refused intervention  Taken 10/23/2024 1014 by Jenna Vidal, RN  VTE Prevention/Management: SCDs off (sequential compression devices)     Problem: Heart Failure  Goal: Optimal Coping  Outcome: Progressing     Problem: Heart Failure  Goal: Optimal Cardiac Output  Outcome: Progressing     Problem: Heart Failure  Goal: Stable Heart Rate and Rhythm  Outcome: Progressing     Problem: Heart Failure  Goal: Fluid and Electrolyte Balance  Outcome: Progressing     Problem: Heart Failure  Goal: Optimal Functional Ability  Outcome: Progressing     Problem: Heart Failure  Goal: Effective Oxygenation and Ventilation  Outcome: Progressing  Intervention:  Promote Airway Secretion Clearance  Recent Flowsheet Documentation  Taken 10/23/2024 1016 by Jenna Vidal, RN  Cough And Deep Breathing: done independently per patient  Taken 10/23/2024 1014 by Jenna Vidal, RN  Cough And Deep Breathing: done independently per patient  Activity Management: activity adjusted per tolerance  Taken 10/23/2024 0900 by Jenna Vidal, RN  Activity Management: activity adjusted per tolerance     Problem: Heart Failure  Goal: Effective Breathing Pattern During Sleep  Intervention: Monitor and Manage Obstructive Sleep Apnea  Recent Flowsheet Documentation  Taken 10/23/2024 1022 by Jenna Vidal, RN  Medication Review/Management: medications reviewed  Taken 10/23/2024 1014 by Jenna Vidal, RN  Medication Review/Management: medications reviewed     Problem: Fluid Volume Excess  Goal: Fluid Balance  Outcome: Progressing      Pt has been on room air all day but desat when sleeping without his home CPAP, encouraged him to use his CPAP when napping. Lungs are clear. Has a nonproductive cough dry. Pt having lots of urine output and breathing seems to be doing better per pt. No swelling in extremities. Working with PT and OT. Heart failure teaching started.   [facility] Admission: 8- Walk 250 feet or more  [facility] Daily7-Walk 25 feet or more

## 2024-10-23 NOTE — ED NOTES
Pt continues to have SOB. On RA with sat in low 90s. CPAP don @ HS. LS coarse. Denies chest pain and N/V. Tele- SR with PVCs. Given IV lasix. Responding well with large urine output. External male cath don. Will continue to monitor. Report given to Maricarmen Mckenna RN who will resume cares.

## 2024-10-23 NOTE — H&P
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  No history of CHF in the past.  Started on diuresis with much improvement of symptoms.    Acute CHF, combined systolic and diastolic likely  No history of CHF  IV Lasix 40 mg twice a day  Strict intake and output, low-salt diet  Echocardiogram 1/2022 revealed EF 30 to 34% with dilated left atrium  Repeat echocardiogram  Resume metoprolol 100 mg daily  Lisinopril 2.5 mg daily started    Chronic cough since Mycoplasma pneumoniae CAP on 9/2024    Dyspnea  Chest CT-no PE, volume overload with small bilateral pleural effusion right more than left mild bibasilar opacity likely atelectasis  Not requiring oxygen    Obstructive sleep apnea, compliant with CPAP use    DM2  Diabetic diet and insulin sliding scale  Lantus 10 unit daily    Essential hypertension  Resume metoprolol and lisinopril    CKD 3, creatinine 1.22    Parkinson disease  Carbidopa levodopa 50/200 mg in the morning and 25/100 mg twice a day    Dyslipidemia  Lipitor 10 mg daily    History of low-grade follicular lymphoma and large cell lymphoma  Status post chemotherapy    Code full  DVT prophylaxis  Subcu heparin 5000 unit twice a day  Inpatient admission  Needs to stay in the hospital at least for 2 days for further evaluation and treatment      Clinically Significant Risk Factors Present on Admission          # Hyperchloremia: Highest Cl = 108 mmol/L in last 2 days, will monitor as  appropriate            # Drug Induced Platelet Defect: home medication list includes an antiplatelet medication   # Hypertension: Noted on problem list             # Financial/Environmental Concerns:     # ICD device present       Chief Complaint Short of breath     HISTORY     Anthony Tiwari is a 89 year old male with history of low-grade follicular lymphoma, 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  He has chronic cough since pneumonia in 9/2024.  Afebrile.  No lower extremity edema, weight gain, paroxysmal nocturnal dyspnea.  He has exertional dyspnea.  Appetite has been stable.  Denies headache, chest pain, palpitation, nausea, vomiting, abdominal pain or urinary symptoms.  Found to have acute CHF.  No history of CHF in the past.  Started on diuresis with much improvement of symptoms.  History is provided by the patient.  Spoke with ED physician.    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 Cody at [facility], getting chemo again in 2017  No date: Nonocclusive coronary atherosclerosis of native coronary   artery      Comment:  nonobstuctive by cor angio    2013: SIRIA on CPAP      Comment:  he sees someone at [facility]  2013: Other  primary cardiomyopathies      Comment:  EF was as low as 23%  02/21/2019: Statin intolerance      Comment:  history of elevated CPK on both simvastatin but less so                on atorvastatin.  No date: Type 2 diabetes mellitus (H)     Surgical History     Past Surgical History:   Procedure Laterality Date    CARDIAC DEFIBRILLATOR PLACEMENT  07/24/2013    Dr. Rodriguez    CATARACT EXTRACTION W/ INTRAOCULAR LENS  IMPLANT, BILATERAL  05/2018    CYSTOSCOPY W/ LITHOLAPAXY / EHL N/A 03/19/2019    Procedure: CYSTOLITHOLAPAXY;  Surgeon: Yunior Moore MD;  Location: [facility];  Service: Urology    HERNIA REPAIR Left     ND CYSTO/URETERO W/LITHOTRIPSY &INDWELL STENT INSRT Right 03/19/2019    Procedure: CYSTOSCOPY RIGHT URETEROSCOPY LASER LITHOTRIPSY;  Surgeon: Yunior Moore MD;  Location: [facility];  Service: Urology    TOTAL HIP ARTHROPLASTY Right 2012    URETEROSCOPY       Family History    Reviewed, and   Family History   Problem Relation Age of Onset    Rheumatic Heart Disease Brother         half brother    Dementia Brother     Cerebrovascular Disease Father     Rheumatic Heart Disease Sister     Sudden Death Sister 67.00        no autopsy    CABG Sister 81.00    No Known Problems Daughter     No Known Problems Daughter     No Known Problems Daughter     No Known Problems Granddaughter     No Known Problems Grandson     Urolithiasis No family hx of     Clotting Disorder No family hx of     Gout No family hx of         Social History      Social History     Tobacco Use    Smoking status: Never    Smokeless tobacco: Never   Vaping Use    Vaping status: Never Used   Substance Use Topics    Alcohol use: No    Drug use: No        Allergies     Allergies   Allergen Reactions    Simvastatin Other (See Comments)     Elevated CPK     Prior to Admission Medications      Prior to Admission Medications   Prescriptions Last Dose Informant Patient Reported? Taking?   CONTOUR NEXT TEST test strip   Yes No    Sig: USE TO TEST BLOOD SUGAR DAILY   LANTUS VIAL 100 UNIT/ML soln   Yes No   Sig: Inject 10 Units subcutaneously at bedtime.   aspirin 81 MG EC tablet   Yes No   Sig: [ASPIRIN 81 MG EC TABLET] Take 81 mg by mouth bedtime.   atorvastatin (LIPITOR) 10 MG tablet   Yes No   Sig: Take 10 mg by mouth at bedtime.   carbidopa-levodopa (SINEMET)  MG tablet   Yes No   Sig: Take 2 tablets by mouth every morning. (Also taking 1 tablet midday and 1 tablet at bedtime)   carbidopa-levodopa (SINEMET)  MG tablet   Yes No   Sig: Take 1 tablet by mouth 2 times daily. Take 1 tablet midday and 1 tablet at bedtime. (Also taking 2 tablets QAM)   levofloxacin (LEVAQUIN) 750 MG tablet   No No   Sig: Take 1 tablet (750 mg) by mouth every other day.   metoprolol succinate ER (TOPROL XL) 100 MG 24 hr tablet   No No   Sig: TAKE 1 TABLET AT BEDTIME      Facility-Administered Medications: None      Review of Systems     A 12 point comprehensive review of systems was negative except as noted above in HPI.    PHYSICAL EXAMINATION       Vitals      Temp:  [97.8  F (36.6  C)] 97.8  F (36.6  C)  Pulse:  [60-80] 68  Resp:  [15-20] 17  BP: (114-123)/(57-67) 119/67  SpO2:  [92 %-96 %] 94 %    Examination     GENERAL:  Alert, appears comfortable, in no acute distress, appears stated age   HEAD:  Normocephalic, without obvious abnormality, atraumatic   EYES:  PERRL, conjunctiva clear,  EOM's intact   NOSE: Nares normal,  mucosa normal, no drainage   THROAT: Lips, mucosa,  gums normal, mouth moist   NECK: Supple, symmetrical, trachea midline   BACK:   Symmetric, no curvature, ROM normal   LUNGS:   Bibasilar crackles, no rales, rhonchi, or wheezing, symmetric chest rise on inhalation, respirations unlabored   CHEST WALL:  No tenderness or deformity   HEART:  Regular rate and rhythm, S1 and S2 normal, no murmur    ABDOMEN:   Soft, non-tender, bowel sounds active , no masses, no organomegaly, no rebound or guarding   EXTREMITIES: Trace BLE edema     SKIN: No rashes in the visualized areas   NEURO: Alert, oriented x 4, moves all four extremities freely, non-focal   PSYCH: Cooperative, behavior is appropriate      Pertinent Lab   Most Recent 3 CBC's:  Recent Labs   Lab Test 10/22/24  1734 09/29/24 0445 09/28/24  1449   WBC 8.2 6.7 9.6   HGB 12.3* 10.3* 11.9*   MCV 91 91 89    186 244     Most Recent 3 BMP's:  Recent Labs   Lab Test 10/22/24  2233 10/22/24  1734 10/08/24  1723 09/29/24  0802 09/29/24  0445   NA  --  142 141  --  134*   POTASSIUM  --  4.4 4.1  --  4.0   CHLORIDE  --  108* 105  --  107   CO2  --  21* 22  --  15*   BUN  --  21.2 19.9  --  29.6*   CR  --  1.22* 1.26*  --  1.47*   ANIONGAP  --  13 14  --  12   SRINI  --  8.9 8.6*  --  8.1*   * 136* 125*   < > 152*    < > = values in this interval not displayed.     Most Recent 2 LFT's:  Recent Labs   Lab Test 10/08/24  1723 06/04/24  1129   AST 17 21   ALT 6 10   ALKPHOS 83 91   BILITOTAL 1.0 1.1       Pertinent Radiology     Radiology Results:   CHEST CT  1.  No evidence of acute pulmonary embolism within motion limitations.  2.  Findings of volume overload with small bilateral pleural effusions, right greater than left, and interstitial edema.  3.  Mild bibasilar opacities favored for atelectasis. The left lower lobe opacities could be in part related to improving infection given the previously seen reticulonodular opacities.    EKG Results: Critz rhythm with PVCs, heart rate 70/min    Total time spent 70 min  Damari Torres MD  [facility] Medicine  [facility]   Phone: #[phone]

## 2024-10-24 VITALS
SYSTOLIC BLOOD PRESSURE: 114 MMHG | DIASTOLIC BLOOD PRESSURE: 74 MMHG | OXYGEN SATURATION: 92 % | HEART RATE: 99 BPM | WEIGHT: 165.1 LBS | BODY MASS INDEX: 25.02 KG/M2 | RESPIRATION RATE: 22 BRPM | HEIGHT: 68 IN | TEMPERATURE: 98.1 F

## 2024-10-24 LAB
ANION GAP SERPL CALCULATED.3IONS-SCNC: 14 MMOL/L (ref 7–15)
BUN SERPL-MCNC: 21.8 MG/DL (ref 8–23)
CALCIUM SERPL-MCNC: 8.7 MG/DL (ref 8.8–10.4)
CHLORIDE SERPL-SCNC: 101 MMOL/L (ref 98–107)
CREAT SERPL-MCNC: 1.17 MG/DL (ref 0.67–1.17)
EGFRCR SERPLBLD CKD-EPI 2021: 60 ML/MIN/1.73M2
GLUCOSE BLDC GLUCOMTR-MCNC: 124 MG/DL (ref 70–99)
GLUCOSE BLDC GLUCOMTR-MCNC: 90 MG/DL (ref 70–99)
GLUCOSE SERPL-MCNC: 130 MG/DL (ref 70–99)
HCO3 SERPL-SCNC: 26 MMOL/L (ref 22–29)
HOLD SPECIMEN: NORMAL
POTASSIUM SERPL-SCNC: 3.8 MMOL/L (ref 3.4–5.3)
SODIUM SERPL-SCNC: 141 MMOL/L (ref 135–145)

## 2024-10-24 PROCEDURE — 999N000157 HC STATISTIC RCP TIME EA 10 MIN

## 2024-10-24 PROCEDURE — 250N000013 HC RX MED GY IP 250 OP 250 PS 637: Performed by: FAMILY MEDICINE

## 2024-10-24 PROCEDURE — 36415 COLL VENOUS BLD VENIPUNCTURE: CPT | Performed by: HOSPITALIST

## 2024-10-24 PROCEDURE — 99238 HOSP IP/OBS DSCHRG MGMT 30/<: CPT | Performed by: HOSPITALIST

## 2024-10-24 PROCEDURE — 94660 CPAP INITIATION&MGMT: CPT

## 2024-10-24 PROCEDURE — 80048 BASIC METABOLIC PNL TOTAL CA: CPT | Performed by: HOSPITALIST

## 2024-10-24 RX ORDER — LISINOPRIL 2.5 MG/1
2.5 TABLET ORAL DAILY
Qty: 30 TABLET | Refills: 0 | Status: SHIPPED | OUTPATIENT
Start: 2024-10-25

## 2024-10-24 RX ORDER — FUROSEMIDE 20 MG/1
10 TABLET ORAL DAILY
Qty: 15 TABLET | Refills: 0 | Status: SHIPPED | OUTPATIENT
Start: 2024-10-24

## 2024-10-24 RX ADMIN — CARBIDOPA AND LEVODOPA 2 TABLET: 25; 100 TABLET ORAL at 08:02

## 2024-10-24 RX ADMIN — POLYETHYLENE GLYCOL 3350 17 G: 17 POWDER, FOR SOLUTION ORAL at 08:02

## 2024-10-24 RX ADMIN — LISINOPRIL 2.5 MG: 2.5 TABLET ORAL at 08:05

## 2024-10-24 ASSESSMENT — ACTIVITIES OF DAILY LIVING (ADL)
ADLS_ACUITY_SCORE: 0

## 2024-10-24 NOTE — PROGRESS NOTES
"[facility]    Medicine Progress Note - Hospitalist Service    Date of Admission:  10/22/2024    Assessment & Plan   Anthony Tiwari is a 89 year old male admitted with decompensated HFrEF.  He is clinically stable.  No hypoxia and trace edema on exam.  Plan to start low dose oral diuretic and discharge with outpatient cardiology followup.  Patient had PVCs demonstrated on EKG and during TTE, which likely accounts for irregular rhythm.    #  Decompensated HFrEF  - transition to oral furosemide    # DM  - basal/bolus insulin    # HTN    # Parkinsons disease    # CKD 3a    # SIRIA  - CPAP          Diet: Fluid restriction 2000 ML FLUID (and additional linked orders)  Moderate Consistent Carb (60 g CHO per Meal) Diet      Awad Catheter: Not present  Lines: None     Cardiac Monitoring: None  Code Status: Full Code      Clinically Significant Risk Factors          # Hyperchloremia: Highest Cl = 108 mmol/L in last 2 days, will monitor as appropriate        # Hypomagnesemia: Lowest Mg = 1.3 mg/dL in last 2 days, will replace as needed       # Hypertension: Noted on problem list  # Acute heart failure with reduced ejection fraction: last echo with EF <40% and receiving IV diuretics          # Overweight: Estimated body mass index is 25.1 kg/m  as calculated from the following:    Height as of this encounter: 1.727 m (5' 8\").    Weight as of this encounter: 74.9 kg (165 lb 1.6 oz)., PRESENT ON ADMISSION     # Financial/Environmental Concerns:     # ICD device present       Disposition Plan     Medically Ready for Discharge: Anticipated Today             Shun Lara MD  Hospitalist Service  [facility]  Securely message with [employer] (more info)  Text page via [employer] Paging/Directory   ______________________________________________________________________    Interval History   Feels like his normal self.  Denies dyspnea, chest pain, or chest pressure.  He is " ambulating.    Physical Exam   Vital Signs: Temp: 98.1  F (36.7  C) Temp src: Oral BP: 114/74 Pulse: 99   Resp: 22 SpO2: 92 % O2 Device: None (Room air)    Weight: 165 lbs 1.6 oz    Gen:  sitting in chair in no extremis  Neuro: alert, conversant  CV:  bradycardia, irregular rhythm  Pulm:  no acute resp distress, faint crackles at left base  Ext: trace lower extremity edema    Medical Decision Making             Data

## 2024-10-24 NOTE — CONSULTS
No CM needs identified or requested.     CM consult for discharge planning.Patient discharge.  prior to being seen by CM.     care coordination referral sent per protocol.        Elysia Perez RN  Care Coordinator

## 2024-10-24 NOTE — PROGRESS NOTES
AVS reviewed by this RN. All questions answered. Patient to discharge home with family. Discharge education reviewed with family at bedside. IV removed.

## 2024-10-24 NOTE — PLAN OF CARE
Problem: Heart Failure  Goal: Optimal Coping  Outcome: Progressing     Problem: Heart Failure  Goal: Stable Heart Rate and Rhythm  Outcome: Progressing    Pt resting between cares, no events. LS clear on RA, denies SOB. CPAP overnight. Primofit on at night. Plan to transition to PO lasix tomorrow, as well as potentially discharging home with family.

## 2024-10-24 NOTE — PLAN OF CARE
Medication/Refill approved per CPA:    [facility] COLLABORATIVE AGREEMENT FOR  IMMUNOSUPPRESSENT PRESCRIPTION MODIFICATION.      Routing encounter to Transplant as an FYI.    Thanks,  Laya Sprague, PharmD  Specialty Pharmacist/Transplant  [facility]  [phone]       Pt A&Ox4, very pleasant, able to make needs known. RA when awake, CPAP on at night. Denies pain, N/V, CP. Ax1 with ambulation and cares. Consistent carb diet, compliant, 2000ml fluid restriction, compliat. Pt plan to discharge home with family. Discharge lounge reviewed AVS and answered family questions prior to discharge. Left unit with belongings around 0940.    Elvin Martin RN on 10/24/2024 at 9:43 AM

## 2024-10-24 NOTE — DISCHARGE SUMMARY
"[facility]  Hospitalist Discharge Summary      Date of Admission:  10/22/2024  Date of Discharge:  No discharge date for patient encounter.  Discharging Provider: Shun Lara MD  Discharge Service: Hospitalist Service    Discharge Diagnoses   Decompensated HFrEF    Clinically Significant Risk Factors     # Overweight: Estimated body mass index is 25.1 kg/m  as calculated from the following:    Height as of this encounter: 1.727 m (5' 8\").    Weight as of this encounter: 74.9 kg (165 lb 1.6 oz).       Follow-ups Needed After Discharge   Follow-up Appointments     Follow-up and recommended labs and tests       Follow up with primary care provider, Alejandro Meredith, within 7 days   for hospital follow- up.  The following labs/tests are recommended:   - check BMP by 11/1/24 due to new furosemide start  - ensure cardiology followup for heart failure            Unresulted Labs Ordered in the Past 30 Days of this Admission       No orders found from 9/22/2024 to 10/23/2024.            Discharge Disposition   Discharged to home  Condition at discharge: Stable    Hospital Course   The patient was admitted with decompensated HFrEF.  He was treated with IV furosemide and had clinical improvement.  He was discharged on a low dose oral diuretic with plan for outpatient cardiology followup.    Consultations This Hospital Stay   CORE CLINIC EVALUATION IP CONSULT  OCCUPATIONAL THERAPY ADULT IP CONSULT  CARE MANAGEMENT / SOCIAL WORK IP CONSULT  PHYSICAL THERAPY ADULT IP CONSULT    Code Status   Full Code    Time Spent on this Encounter   I, Shun Lara MD, personally saw the patient today and spent less than or equal to 30 minutes discharging this patient.       Shun Lara MD  [facility] [address] [address]  Phone: [phone]  Fax: [phone]  ______________________________________________________________________    Physical " Exam   Vital Signs: Temp: 98.1  F (36.7  C) Temp src: Oral BP: 114/74 Pulse: 99   Resp: 22 SpO2: 92 % O2 Device: None (Room air)    Weight: 165 lbs 1.6 oz    See progress note       Primary Care Physician   Alejandro Meredith    Discharge Orders      Follow-Up with Cardiology      Reason for your hospital stay    You were admitted to the hospital with extra fluid on your body due to heart failure.  You were treated with medication to remove fluid and had improvement.     Follow-up and recommended labs and tests     Follow up with primary care provider, Alejandro Meredith, within 7 days for hospital follow- up.  The following labs/tests are recommended:   - check BMP by 11/1/24 due to new furosemide start  - ensure cardiology followup for heart failure     Activity    Your activity upon discharge: activity as tolerated     Diet    Follow this diet upon discharge: low salt, diabetic diet       Discharge Follow-up Details      Follow-Up with Cardiology      Cardiovascular Disease              Follow-up with: Other Cardiologist    Reason for follow-up: Heart Failure    Patient Scheduling Instructions: [facility] will call you to coordinate your care as prescribed by your provider. If you have concerns about scheduling, please call [phone].            Significant Results and Procedures   Most Recent 3 CBC's:  Recent Labs   Lab Test 10/22/24  1734 09/29/24  0445 09/28/24  1449   WBC 8.2 6.7 9.6   HGB 12.3* 10.3* 11.9*   MCV 91 91 89    186 244     Most Recent 3 BMP's:  Recent Labs   Lab Test 10/24/24  0825 10/24/24  0820 10/24/24  0210 10/23/24  0810 10/23/24  0619 10/22/24  2233 10/22/24  1944 10/22/24  1734   NA  --  141  --   --   --   --  140 142   POTASSIUM  --  3.8  --   --  4.0  --  4.3 4.4   CHLORIDE  --  101  --   --   --   --  107 108*   CO2  --  26  --   --   --   --  20* 21*   BUN  --  21.8  --   --   --   --  20.2 21.2   CR  --  1.17  --   --   --   --  1.18* 1.22*   ANIONGAP  --  14  --   --    --   --  13 13   SRINI  --  8.7*  --   --   --   --  8.4* 8.9   * 130* 90   < >  --    < > 102* 136*    < > = values in this interval not displayed.   ,   Results for orders placed or performed during the hospital encounter of 10/22/24   CT Chest Pulmonary Embolism w Contrast    Narrative    EXAM: CT CHEST PULMONARY EMBOLISM W CONTRAST  LOCATION: [facility]  DATE: 10/22/2024    INDICATION: Dyspnea, elevated d dimer  COMPARISON: CT chest 9/28/2024 and PET/CT 7/15/2024.  TECHNIQUE: CT chest pulmonary angiogram during arterial phase injection of IV contrast. Multiplanar reformats and MIP reconstructions were performed. Dose reduction techniques were used.   CONTRAST: Isovue 370 75mL    FINDINGS:  ANGIOGRAM CHEST: Normal caliber central pulmonary arteries. Respiratory motion limits evaluation of the more distal pulmonary arteries. No pulmonary arterial filling defect identified within limitations. Normal caliber thoracic aorta with mild calcified   atherosclerotic plaque.     LUNGS AND PLEURA: Patent central airways. Small bilateral pleural effusions have developed, right greater than left. Interlobular septal thickening. Mild bibasilar opacities adjacent to the pleural effusions. Some of the previously seen left lower lobe   reticulonodular opacities have improved.    MEDIASTINUM/AXILLAE: Heart size is normal. Left chest wall cardiac conduction device with right atrial and right ventricular leads. No thoracic lymphadenopathy.    CORONARY ARTERY CALCIFICATION: Moderate.    UPPER ABDOMEN: Hepatic steatosis. Cholelithiasis. Left renal cyst, for which no dedicated imaging follow-up is required.    MUSCULOSKELETAL: Thoracic spondylosis. No destructive osseous lesion.      Impression    IMPRESSION:  1.  No evidence of acute pulmonary embolism within motion limitations.  2.  Findings of volume overload with small bilateral pleural effusions, right greater than left, and interstitial edema.  3.   Mild bibasilar opacities favored for atelectasis. The left lower lobe opacities could be in part related to improving infection given the previously seen reticulonodular opacities.   Echocardiogram Complete     Value    LVEF  25-30% (severely reduced)    [facility]    [id number]  [id number]  [id number]  [id number]^[address] [address]     Name: THU BAIG  MRN: [id number]  : 1935  Study Date: 10/23/2024 07:30 AM  Age: 89 yrs  Gender: Male  Patient Location: [facility]  Reason For Study: CHF  Ordering Physician: CLAYTON SILVA  Performed By:      BSA: 1.9 m2  Height: 70 in  Weight: 170 lb  HR: 85  ______________________________________________________________________________  Procedure  Complete Portable Echo Adult. Definity (NDC #61974-512) given intravenously.  ______________________________________________________________________________  Interpretation Summary     Left ventricular function is decreased. The ejection fraction is 25-30%  (severely reduced).  Normal right ventricle size and systolic function.  The left atrium is moderate to severely dilated.  The right atrium is mild to moderately dilated.  There is mild (1+) aortic regurgitation.  IVC diameter <2.1 cm collapsing >50% with sniff suggests a normal RA pressure  of 3 mmHg.  The patient exhibited frequent PVCs.  ______________________________________________________________________________  Left Ventricle  The left ventricle is mildly dilated. Left ventricular function is decreased.  The ejection fraction is 25-30% (severely reduced). Left ventricular diastolic  function is abnormal. There is severe global hypokinesia of the left  ventricle.     Right Ventricle  Normal right ventricle size and systolic function. There is a ICD lead in the  right ventricle.     Atria  The left atrium is moderate to severely dilated. The right atrium is mild to  moderately dilated. There is no color Doppler  evidence of an atrial shunt.     Mitral Valve  Mitral valve leaflets appear normal. There is mild (1+) mitral regurgitation.     Tricuspid Valve  Tricuspid valve leaflets appear normal. There is mild (1+) tricuspid  regurgitation. The right ventricular systolic pressure is approximated at 32.2  mmHg plus the right atrial pressure.     Aortic Valve  The aortic valve is trileaflet. Moderate aortic valve calcification is  present. There is mild (1+) aortic regurgitation. No hemodynamically  significant valvular aortic stenosis.     Pulmonic Valve  Normal pulmonic valve.     Vessels  Normal size ascending aorta. IVC diameter <2.1 cm collapsing >50% with sniff  suggests a normal RA pressure of 3 mmHg.     Pericardium  There is no pericardial effusion.     Rhythm  The patient exhibited frequent PVCs.  ______________________________________________________________________________  MMode/2D Measurements & Calculations  LVIDd: 5.9 cm  LVIDs: 5.3 cm  FS: 10.5 %  Ao root diam: 3.7 cm  asc Aorta Diam: 3.4 cm  LVOT diam: 2.2 cm  LVOT area: 3.9 cm2  Ao root diam index Ht(cm/m): 2.1  Ao root diam index BSA (cm/m2): 1.9  Asc Ao diam index BSA (cm/m2): 1.8  Asc Ao diam index Ht(cm/m): 1.9  EF Biplane: 43.4 %  LA Volume (BP): 73.9 ml     LA Volume Index (BP): 37.9 ml/m2  LA Volume Indexed (AL/bp): 40.3 ml/m2  TAPSE: 1.8 cm     Doppler Measurements & Calculations  MV E max oliverio: 76.7 cm/sec  MV A max oliverio: 58.3 cm/sec  MV E/A: 1.3  MV dec slope: 306.8 cm/sec2  MV dec time: 0.25 sec     Ao V2 max: 122.9 cm/sec  Ao max P.0 mmHg  Ao V2 mean: 84.9 cm/sec  Ao mean PG: 3.3 mmHg  Ao V2 VTI: 28.4 cm  MICHAEL(I,D): 1.8 cm2  MICHAEL(V,D): 2.4 cm2  LV V1 max P.2 mmHg  LV V1 max: 75.0 cm/sec  LV V1 VTI: 13.1 cm  SV(LVOT): 51.6 ml  SI(LVOT): 26.5 ml/m2  PA V2 max: 90.9 cm/sec  PA max PG: 3.3 mmHg  PA acc time: 0.09 sec  TR max oliverio: 283.7 cm/sec  TR max P.2 mmHg  AV Oliverio Ratio (DI): 0.61  MICHAEL Index (cm2/m2): 0.93  E/E' av.1  Lateral E/e':  10.8  [facility] E/e': 29.4  RV S Oliverio: 16.6 cm/sec     ______________________________________________________________________________  Report approved by: Caitlyn Carvajal 10/23/2024 09:00 AM             Discharge Medications   Current Discharge Medication List        START taking these medications    Details   furosemide (LASIX) 20 MG tablet Take 0.5 tablets (10 mg) by mouth daily.  Qty: 15 tablet, Refills: 0    Associated Diagnoses: Acute on chronic systolic heart failure (H)      lisinopril (ZESTRIL) 2.5 MG tablet Take 1 tablet (2.5 mg) by mouth daily.  Qty: 30 tablet, Refills: 0    Associated Diagnoses: Acute on chronic systolic heart failure (H)           CONTINUE these medications which have NOT CHANGED    Details   aspirin 81 MG EC tablet [ASPIRIN 81 MG EC TABLET] Take 81 mg by mouth bedtime.      atorvastatin (LIPITOR) 10 MG tablet Take 10 mg by mouth at bedtime.      !! carbidopa-levodopa (SINEMET)  MG tablet Take 1 tablet by mouth 2 times daily. Take 1 tablet midday and 1 tablet at bedtime. (Also taking 2 tablets QAM)      !! carbidopa-levodopa (SINEMET)  MG tablet Take 2 tablets by mouth every morning. (Also taking 1 tablet midday and 1 tablet at bedtime)      LANTUS VIAL 100 UNIT/ML soln Inject 10 Units subcutaneously at bedtime.      metoprolol succinate ER (TOPROL XL) 100 MG 24 hr tablet TAKE 1 TABLET AT BEDTIME  Qty: 90 tablet, Refills: 1    Associated Diagnoses: NICM (nonischemic cardiomyopathy) (H)      CONTOUR NEXT TEST test strip USE TO TEST BLOOD SUGAR DAILY       !! - Potential duplicate medications found. Please discuss with provider.        Allergies   Allergies   Allergen Reactions    Simvastatin Other (See Comments)     Elevated CPK

## 2024-10-24 NOTE — PLAN OF CARE
Pt A&O, intermittently confused. VSS on CPAP overnight. Denies pain. Utilizing urinal in bed for voiding. Sleeping comfortable between cares. Call light in reach. Possible discharge tomorrow after transition to PO lasix.   Problem: Adult Inpatient Plan of Care  Goal: Optimal Comfort and Wellbeing  Outcome: Progressing     Problem: Risk for Delirium  Goal: Improved Sleep  Outcome: Progressing   Goal Outcome Evaluation:

## 2024-10-24 NOTE — PLAN OF CARE
"  Problem: Adult Inpatient Plan of Care  Goal: Plan of Care Review  Description: The Plan of Care Review/Shift note should be completed every shift.  The Outcome Evaluation is a brief statement about your assessment that the patient is improving, declining, or no change.  This information will be displayed automatically on your shift  note.  Outcome: Adequate for Care Transition  Goal: Patient-Specific Goal (Individualized)  Description: You can add care plan individualizations to a care plan. Examples of Individualization might be:  \"Parent requests to be called daily at 9am for status\", \"I have a hard time hearing out of my right ear\", or \"Do not touch me to wake me up as it startles  me\".  Outcome: Adequate for Care Transition  Goal: Absence of Hospital-Acquired Illness or Injury  Outcome: Adequate for Care Transition  Goal: Optimal Comfort and Wellbeing  Outcome: Adequate for Care Transition  Goal: Readiness for Transition of Care  Outcome: Adequate for Care Transition     Problem: Risk for Delirium  Goal: Optimal Coping  Outcome: Adequate for Care Transition  Goal: Improved Behavioral Control  Outcome: Adequate for Care Transition  Goal: Improved Attention and Thought Clarity  Outcome: Adequate for Care Transition  Goal: Improved Sleep  Outcome: Adequate for Care Transition     Problem: Heart Failure  Goal: Optimal Coping  Outcome: Adequate for Care Transition  Goal: Optimal Cardiac Output  Outcome: Adequate for Care Transition  Goal: Stable Heart Rate and Rhythm  Outcome: Adequate for Care Transition  Goal: Fluid and Electrolyte Balance  Outcome: Adequate for Care Transition  Goal: Optimal Functional Ability  Outcome: Adequate for Care Transition  Goal: Improved Oral Intake  Outcome: Adequate for Care Transition  Goal: Effective Oxygenation and Ventilation  Outcome: Adequate for Care Transition  Goal: Effective Breathing Pattern During Sleep  Outcome: Adequate for Care Transition     Problem: Fluid Volume " Excess  Goal: Fluid Balance  Outcome: Adequate for Care Transition

## 2024-10-24 NOTE — PROGRESS NOTES
Occupational Therapy Discharge Summary    Reason for therapy discharge:    Discharged to home.    Progress towards therapy goal(s). See goals on Care Plan in [facility] electronic health record for goal details.  Goals met    Therapy recommendation(s):    No further therapy is recommended.

## 2024-10-28 ENCOUNTER — LAB REQUISITION (OUTPATIENT)
Dept: LAB | Facility: CLINIC | Age: 89
End: 2024-10-28
Payer: MEDICARE

## 2024-10-28 ENCOUNTER — TRANSFERRED RECORDS (OUTPATIENT)
Dept: HEALTH INFORMATION MANAGEMENT | Facility: CLINIC | Age: 89
End: 2024-10-28

## 2024-10-28 DIAGNOSIS — I50.20 UNSPECIFIED SYSTOLIC (CONGESTIVE) HEART FAILURE (H): ICD-10-CM

## 2024-10-28 PROCEDURE — 80048 BASIC METABOLIC PNL TOTAL CA: CPT | Mod: ORL | Performed by: FAMILY MEDICINE

## 2024-10-29 LAB
ANION GAP SERPL CALCULATED.3IONS-SCNC: 9 MMOL/L (ref 7–15)
BUN SERPL-MCNC: 40.1 MG/DL (ref 8–23)
CALCIUM SERPL-MCNC: 8.9 MG/DL (ref 8.8–10.4)
CHLORIDE SERPL-SCNC: 105 MMOL/L (ref 98–107)
CREAT SERPL-MCNC: 1.9 MG/DL (ref 0.67–1.17)
EGFRCR SERPLBLD CKD-EPI 2021: 33 ML/MIN/1.73M2
GLUCOSE SERPL-MCNC: 150 MG/DL (ref 70–99)
HCO3 SERPL-SCNC: 27 MMOL/L (ref 22–29)
POTASSIUM SERPL-SCNC: 4.9 MMOL/L (ref 3.4–5.3)
SODIUM SERPL-SCNC: 141 MMOL/L (ref 135–145)

## 2024-11-11 ENCOUNTER — HOSPITAL ENCOUNTER (OUTPATIENT)
Dept: CT IMAGING | Facility: HOSPITAL | Age: 89
Discharge: HOME | End: 2024-11-11
Attending: INTERNAL MEDICINE | Admitting: INTERNAL MEDICINE
Payer: MEDICARE

## 2024-11-11 ENCOUNTER — OFFICE VISIT (OUTPATIENT)
Dept: PULMONOLOGY | Facility: CLINIC | Age: 89
End: 2024-11-11
Payer: MEDICARE

## 2024-11-11 VITALS
OXYGEN SATURATION: 96 % | WEIGHT: 167 LBS | DIASTOLIC BLOOD PRESSURE: 64 MMHG | BODY MASS INDEX: 25.39 KG/M2 | HEART RATE: 60 BPM | SYSTOLIC BLOOD PRESSURE: 122 MMHG

## 2024-11-11 DIAGNOSIS — R93.89 ABNORMAL CHEST CT: ICD-10-CM

## 2024-11-11 DIAGNOSIS — J15.9 COMMUNITY ACQUIRED BACTERIAL PNEUMONIA: ICD-10-CM

## 2024-11-11 DIAGNOSIS — J18.9 COMMUNITY ACQUIRED PNEUMONIA OF LEFT LOWER LOBE OF LUNG: ICD-10-CM

## 2024-11-11 DIAGNOSIS — Z92.89 HISTORY OF RECENT HOSPITALIZATION: Primary | ICD-10-CM

## 2024-11-11 PROCEDURE — 71250 CT THORAX DX C-: CPT | Mod: MG

## 2024-11-11 PROCEDURE — 99213 OFFICE O/P EST LOW 20 MIN: CPT | Performed by: NURSE PRACTITIONER

## 2024-11-11 NOTE — PROGRESS NOTES
PULMONARY OUTPATIENT PROGRESS NOTE   November 11, 2024     Assessment:   Anthony Tiwari is a 89 year old male who presents for hospital follow-up.  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     LLL pneumonia: Respiratory PCR positive for mycoplasma pneumoniae. Resolved on 6 week follow up imaging.   Resolved RLL nodule and mediastinal adenopathy   PET CT 8/2023 showed increase uptake RLL nodule and mediastinal adenopathy.   Though to be inflammatory.   Follow up chest CT scan 12/18/23 showed resolution of RLL nodule and mediastinal adenopathy   Recurrent low grade follicular lymphoma   Diagnosed on 2014, recurrent disease in the pelvis area on 2017 treated with rituxan  New gluteal lesion diagnosed on 9/2023. US guided biopsy (+) low grade follicular lymphoma  Oncology service is following, most recent images have not been pushed. Will call over to obtain these today.   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   Chronic rhinitis   Symptoms have improved. No using atrovent.   SIRIA on CPAP  Overflow diarrhea / constipation   Miralax daily   Non ischemic cardiomyopathy, s/p dual chamber ICD     Plan:      Continue CPAP therapy at night  Will get most recent PET/CT prior to next visit  Encouraged daily weights, low-salt intake, and activity as able.  Suggested he obtain a pulse oximeter and check SpO2 when feeling short of breath at home.  Follow up in one year     Oralia Lara CNP  Pulmonary Medicine  [facility]   [phone]       CC:     Chief Complaint   Patient presents with    Follow Up     CT scan and hospital follow up         HPI:   Anthony  presents today for hospital follow-up.  He is accompanied by his daughter who helps offer history information.  He was last seen in clinic in 06/2024.  He is followed by Dr. Ferrara.  Since his last appointment he has been to the emergency room once and hospitalized twice.  9/12: ED visit, diagnosed with pneumonia. completed a course of Augmentin and azithromycin.   9/28-9/30: Hospitalized for pneumonia. Respiratory PCR positive for mycoplasma pneumoniae.  Treated with Levaquin and doxycycline.  10/22-10/24: Hospitalized for decompensated HFrEF.     Reports doing well, mild exertional dyspnea, denies cough or wheezes.  Denies lower extremity edema since discharge from the hospital.  He has been taking his diuretic pills.  Denies using inhalers. Stop using nasal spray.   Denies chest pain, orthopnea, PND.  Denies nausea, vomiting or abdominal pain.   Denies fever, chills or night sweats.   Diagnosed with sleep apnea, uses CPAP every night.   No tobacco use in the past.      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 Cody at [facility], getting chemo again in 2017    Nonocclusive coronary atherosclerosis of native coronary artery     nonobstuctive by cor angio      SIRIA on CPAP 2013    he sees someone at [facility]    Other primary cardiomyopathies 2013    EF was as low as 23%    Statin intolerance 02/21/2019    history of elevated CPK on both simvastatin but less so on atorvastatin.    Type 2 diabetes mellitus (H)       Medications:     Current Outpatient Medications   Medication Sig Dispense Refill     aspirin 81 MG EC tablet [ASPIRIN 81 MG EC TABLET] Take 81 mg by mouth bedtime.      atorvastatin (LIPITOR) 10 MG tablet Take 10 mg by mouth at bedtime.      carbidopa-levodopa (SINEMET)  MG tablet Take 2 tablets by mouth every morning. (Also taking 1 tablet midday and 1 tablet at bedtime)      CONTOUR NEXT TEST test strip USE TO TEST BLOOD SUGAR DAILY      furosemide (LASIX) 20 MG tablet Take 0.5 tablets (10 mg) by mouth daily. 15 tablet 0    LANTUS VIAL 100 UNIT/ML soln Inject 10 Units subcutaneously at bedtime.      lisinopril (ZESTRIL) 2.5 MG tablet Take 1 tablet (2.5 mg) by mouth daily. 30 tablet 0    metoprolol succinate ER (TOPROL XL) 100 MG 24 hr tablet TAKE 1 TABLET AT BEDTIME 90 tablet 1    carbidopa-levodopa (SINEMET)  MG tablet Take 1 tablet by mouth 2 times daily. Take 1 tablet midday and 1 tablet at bedtime. (Also taking 2 tablets QAM)       No current facility-administered medications for this visit.      Social History :     Social History     Socioeconomic History    Marital status:      Spouse name: Not on file    Number of children: 3    Years of education: Not on file    Highest education level: Not on file   Occupational History    Not on file   Tobacco Use    Smoking status: Never    Smokeless tobacco: Never   Vaping Use    Vaping status: Never Used   Substance and Sexual Activity    Alcohol use: No    Drug use: No    Sexual activity: Not on file   Other Topics Concern    Not on file   Social History Narrative    He used to walk for an hour most days and still takes care of his lawn and snow removal, though his grandsons are helping more with the lawn in 2018. 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 He is walking less in 2019. His wife,  Rebeka, is 6 years his tu and is providing daytime and overnight  for their daughter Loree's fraternal twins, Estrella and Jovanny, since 2019. Anthony spends 5 hours a day with Loree and the twins.      Social Drivers of Health     Financial Resource Strain: Low Risk  (10/23/2024)    Financial Resource Strain     Within the past 12 months, have you or your family members you live with been unable to get utilities (heat, electricity) when it was really needed?: No   Food Insecurity: Low Risk  (10/23/2024)    Food Insecurity     Within the past 12 months, did you worry that your food would run out before you got money to buy more?: No     Within the past 12 months, did the food you bought just not last and you didn t have money to get more?: No   Transportation Needs: Low Risk  (10/23/2024)    Transportation Needs     Within the past 12 months, has lack of transportation kept you from medical appointments, getting your medicines, non-medical meetings or appointments, work, or from getting things that you need?: No   Physical Activity: Not on file   Stress: Not on file   Social Connections: Unknown (1/1/2022)    Received from [facility], [facility]    Social Connections     Frequency of Communication with Friends and Family: Not on file   Interpersonal Safety: Low Risk  (10/23/2024)    Interpersonal Safety     Do you feel physically and emotionally safe where you currently live?: Yes     Within the past 12 months, have you been hit, slapped, kicked or otherwise physically hurt by someone?: No     Within the past 12 months, have you been humiliated or emotionally abused in other ways by your partner or ex-partner?: No   Housing Stability: Low Risk  (10/23/2024)    Housing Stability     Do you have housing? : Yes     Are you worried about losing your housing?: No          Family History :     Family History   Problem Relation Age of Onset    Rheumatic Heart Disease Brother         half brother    Dementia Brother     Cerebrovascular Disease Father     Rheumatic Heart Disease Sister     Sudden Death Sister 67.00        no autopsy    CABG Sister 81.00     No Known Problems Daughter     No Known Problems Daughter     No Known Problems Daughter     No Known Problems Granddaughter     No Known Problems Grandson     Urolithiasis No family hx of     Clotting Disorder No family hx of     Gout No family hx of        Review of Systems  A 12 point comprehensive review of systems was negative except as noted.     Objective:     /64   Pulse 60   Wt 75.8 kg (167 lb)   SpO2 96%   BMI 25.39 kg/m      Physical Exam  Constitutional:       General: He is not in acute distress.     Appearance: He is not ill-appearing.   Cardiovascular:      Rate and Rhythm: Normal rate and regular rhythm.      Heart sounds: Normal heart sounds.   Pulmonary:      Effort: Pulmonary effort is normal. No respiratory distress.      Breath sounds: No wheezing or rhonchi.   Musculoskeletal:      Right lower leg: No edema.      Left lower leg: No edema.   Neurological:      Mental Status: He is alert.   Psychiatric:         Behavior: Behavior normal.           Diagnostic tests:        PATHOLOGY: (9/20/2023)     A(1). Buttock, Left, :  - Low-grade (grade 1-2 of 3) follicular lymphoma  - Pending flow cytometry ([facility])  - Pending FISH studies ([facility])    IMAGES:     Chest CT scan 8/16/2023  RLL nodule 1.2 cm located in right paravertebral area.  No abnormal lymphadenopathy     PET CT scan 8/29/2023  Mild avid uptake of the ground glass RLL nodule (SUV 2.6), associated mild uptake 10R lymph node (SUV 4.0) Subcutaneous tissue nodule left gluteal area (SUV 10).     CT CHEST W/O CONTRAST  DATE: 12/18/2023  INDICATION: follow up lung nodule  COMPARISON: CT 08/16/2023, 08/12/2022.  FINDINGS:   LUNGS AND PLEURA: The right lower lobe lung nodule has resolved. No effusion. No new nodule.  MEDIASTINUM/AXILLAE: No lymphadenopathy. No thoracic aortic aneurysm.  CORONARY ARTERY CALCIFICATION: Mild.  UPPER ABDOMEN: Tiny gallbladder hyperdensity suggests a small stone. Left upper renal 1 mm nonobstructive stone.  Stable 5 mm left posterior renal exophytic nodule is most likely a hyperdense cyst. Stable PET negative left renal 5.9 cm fluid density lesion with some intrinsic linear calcification.  MUSCULOSKELETAL: Thoracic DISH.  IMPRESSION:   1.  Resolution right lower lobe nodule.  2.  No evidence of malignanct recurrence.  3.  Possible small gallstone.  4.  Left renal 1 mm nonobstructive stone.     CT CHEST W/O CONTRAST, DATE: 9/28/2024  INDICATION: cough, fever.  COMPARISON: Chest x-ray 9/28/2024, PET/CT 7/15/2024.  FINDINGS:                                                              IMPRESSION:   1.  Patchy consolidation newly seen with micronodular opacities primarily at the left lower lobe. Associated bibasilar peribronchial wall thickening. Correlate with pneumonia.  2.  Thoracic lymph nodes are small in size, but a few are slightly larger as compared to 7/15/2024.    CT CHEST W/O CONTRAST, DATE: 11/11/2024  INDICATION:  Abnormal chest CT, Community acquired bacterial pneumonia  COMPARISON: CT chest with contrast 10/22/2024, 9/28/2024                                        IMPRESSION:   1.  No acute lung, airway, or pleural inflammatory process.  2.  Mild four-chamber cardiac enlargement and degenerative calcification of aortic valve leaflets.

## 2024-11-11 NOTE — PATIENT INSTRUCTIONS
It was a pleasure seeing you in clinic today. This is what we discussed:    Continue doing what you are doing. Monitor your weight and salt intake.   Use your albuterol every 4 hours as needed for cough, shortness of breath, wheeze, or chest tightness.   Follow up with Dr. Ferrara in July as planned.   If you have worsening symptoms, questions, or need to speak with the nurse please call [phone].

## 2024-11-12 PROBLEM — I50.9 ACUTE CONGESTIVE HEART FAILURE, UNSPECIFIED HEART FAILURE TYPE (H): Status: RESOLVED | Noted: 2024-10-22 | Resolved: 2024-11-12

## 2024-11-13 ENCOUNTER — ALLIED HEALTH/NURSE VISIT (OUTPATIENT)
Dept: CARDIOLOGY | Facility: CLINIC | Age: 89
End: 2024-11-13
Payer: MEDICARE

## 2024-11-13 ENCOUNTER — OFFICE VISIT (OUTPATIENT)
Dept: CARDIOLOGY | Facility: CLINIC | Age: 89
End: 2024-11-13
Payer: MEDICARE

## 2024-11-13 VITALS
RESPIRATION RATE: 20 BRPM | BODY MASS INDEX: 26.44 KG/M2 | DIASTOLIC BLOOD PRESSURE: 56 MMHG | SYSTOLIC BLOOD PRESSURE: 110 MMHG | OXYGEN SATURATION: 98 % | HEART RATE: 60 BPM | HEIGHT: 66 IN | WEIGHT: 164.5 LBS

## 2024-11-13 DIAGNOSIS — N18.31 ACUTE RENAL FAILURE SUPERIMPOSED ON STAGE 3A CHRONIC KIDNEY DISEASE, UNSPECIFIED ACUTE RENAL FAILURE TYPE (H): ICD-10-CM

## 2024-11-13 DIAGNOSIS — Z95.810 ICD (IMPLANTABLE CARDIOVERTER-DEFIBRILLATOR) IN PLACE: Chronic | ICD-10-CM

## 2024-11-13 DIAGNOSIS — I47.29 NSVT (NONSUSTAINED VENTRICULAR TACHYCARDIA) (H): ICD-10-CM

## 2024-11-13 DIAGNOSIS — R06.09 DOE (DYSPNEA ON EXERTION): ICD-10-CM

## 2024-11-13 DIAGNOSIS — I25.10 CORONARY ARTERY CALCIFICATION SEEN ON CT SCAN: ICD-10-CM

## 2024-11-13 DIAGNOSIS — I42.9 IDIOPATHIC CARDIOMYOPATHY (H): Chronic | ICD-10-CM

## 2024-11-13 DIAGNOSIS — I50.23 ACUTE ON CHRONIC SYSTOLIC HEART FAILURE (H): Primary | ICD-10-CM

## 2024-11-13 DIAGNOSIS — I50.9 ACUTE DECOMPENSATED HEART FAILURE (H): ICD-10-CM

## 2024-11-13 DIAGNOSIS — I10 ESSENTIAL HYPERTENSION: Chronic | ICD-10-CM

## 2024-11-13 DIAGNOSIS — G47.33 OSA ON CPAP: Chronic | ICD-10-CM

## 2024-11-13 DIAGNOSIS — N17.9 ACUTE RENAL FAILURE SUPERIMPOSED ON STAGE 3A CHRONIC KIDNEY DISEASE, UNSPECIFIED ACUTE RENAL FAILURE TYPE (H): ICD-10-CM

## 2024-11-13 LAB
ANION GAP SERPL CALCULATED.3IONS-SCNC: 13 MMOL/L (ref 7–15)
BUN SERPL-MCNC: 26.8 MG/DL (ref 8–23)
CALCIUM SERPL-MCNC: 9.1 MG/DL (ref 8.8–10.4)
CHLORIDE SERPL-SCNC: 100 MMOL/L (ref 98–107)
CREAT SERPL-MCNC: 1.26 MG/DL (ref 0.67–1.17)
EGFRCR SERPLBLD CKD-EPI 2021: 55 ML/MIN/1.73M2
GLUCOSE SERPL-MCNC: 113 MG/DL (ref 70–99)
HCO3 SERPL-SCNC: 25 MMOL/L (ref 22–29)
MAGNESIUM SERPL-MCNC: 1.8 MG/DL (ref 1.7–2.3)
POTASSIUM SERPL-SCNC: 4.9 MMOL/L (ref 3.4–5.3)
SODIUM SERPL-SCNC: 138 MMOL/L (ref 135–145)

## 2024-11-13 PROCEDURE — 99207 PR NO CHARGE LOS: CPT

## 2024-11-13 PROCEDURE — 80048 BASIC METABOLIC PNL TOTAL CA: CPT | Performed by: NURSE PRACTITIONER

## 2024-11-13 PROCEDURE — 83735 ASSAY OF MAGNESIUM: CPT | Performed by: NURSE PRACTITIONER

## 2024-11-13 PROCEDURE — 99215 OFFICE O/P EST HI 40 MIN: CPT | Performed by: NURSE PRACTITIONER

## 2024-11-13 PROCEDURE — 36415 COLL VENOUS BLD VENIPUNCTURE: CPT | Performed by: NURSE PRACTITIONER

## 2024-11-13 PROCEDURE — G2211 COMPLEX E/M VISIT ADD ON: HCPCS | Performed by: NURSE PRACTITIONER

## 2024-11-13 NOTE — PROGRESS NOTES
Assessment/Recommendations   Assessment:    #   History of idiopathic cardiomyopathy with status post ICD, Acute on chronic systolic heart failure, NYHA Class NYHA Class I-II:  Patient was hospitalized in September with community-acquired pneumonia.    Patient was rehospitalized in October with decompensated heart failure.  Echocardiogram showed LVEF reduced to 25 to 30%.  Previous echo showed LVEF of 35 to 40% per echo in January 2022.    Patient was treated with IV diuretic therapy and was discharged on oral furosemide.    Heart failure regimen includes:    -Beta-blocker therapy with metoprolol succinate 100 mg daily    -ACEI  therapy with lisinopril 2.5 mg daily    -Not on aldosterone blocker/MRA therapy    -Not on SGLT2 Inhibitor    -Diuretic therapy with furosemide 10 mg daily    We discussed and reviewed about heart failure, medication management, and lifestyle management including low sodium diet <2 g/day, daily weight, and staying physically active as tolerated. Patient met with [facility] nurse clinician for heart failure education.    # NSVT: Recent device interrogation showed increased burden of NSVT.    #  Hypertension: Blood pressure stable.    # Acute on chronic kidney disease stage IIIb:BMP on 10/20/2024 showed creatinine of 1.90 and BUN 40.    # Obstructive sleep apnea: Using CPAP regularly.    # Moderate coronary artery calcification per chest CT on 11/11/2024: Previously noted mild calcification.  No chest pain.  Recent hospitalization with acute systolic heart failure with further worsening LVEF and increased burden of NSVT.    Plan/Recommendation:  -BMP pending  -Enroll in open arm heart failure diet program- application provided  -With further decline in LVEF with shortness of breath and increased burden of NSVT, we discussed about repeating nuclear stress test for further ischemic eval.  Patient is agreeable.   - Schedule Lexiscan stress test few days prior to follow-up with   "[facility]  -Continue on low-sodium diet <2000 mg per day, daily weight monitoring, and maintain fluid intake at 50 to 60 ounces per day    Follow up with Dr. Rice or TINO Myles in 1-2 months. Follow up with me in 3-4 months     The longitudinal plan of care for acute on chronic systolic heart failure was addressed during this visit.?Due to the added   complexity in care, I will continue to support Anthony Tiwari in the subsequent management of this   condition(s) and with the ongoing continuity of care of this condition(s)\".     History of Present Illness/Subjective    Mr. Anthony Tiwari is a 89 year old male with a past medical history of nonischemic cardiomyopathy status post ICD, NSVT, recent hospitalization with pneumonia in September, and rehospitalization in October with acute decompensated heart failure and acute on chronic kidney disease who is seen at [facility] for post hospitalization heart failure follow up.     Today, Dic reports feeling much improvement in his shortness of breath and heart failure symptoms since recent hospitalization.  He does experience some shortness of breath on exertion.  He denies fatigue, lightheadedness, shortness of breath, orthopnea, PND, palpitations, chest pain, abdominal fullness/bloating, and lower extremity edema.        ECHO from 10/2024-Reviewed:   Interpretation Summary     Left ventricular function is decreased. The ejection fraction is 25-30%  (severely reduced).  Normal right ventricle size and systolic function.  The left atrium is moderate to severely dilated.  The right atrium is mild to moderately dilated.  There is mild (1+) aortic regurgitation.  IVC diameter <2.1 cm collapsing >50% with sniff suggests a normal RA pressure  of 3 mmHg.  The patient exhibited frequent PVCs.     Physical Examination Review of Systems   /56 (BP Location: Left arm, Patient Position: Sitting, Cuff Size: Adult Regular)   Pulse 60   " "Resp 20   Ht 1.685 m (5' 6.34\")   Wt 74.6 kg (164 lb 8 oz)   SpO2 98%   BMI 26.28 kg/m    Body mass index is 26.28 kg/m .  Wt Readings from Last 3 Encounters:   11/13/24 74.6 kg (164 lb 8 oz)   11/11/24 75.8 kg (167 lb)   10/23/24 74.9 kg (165 lb 1.6 oz)     General Appearance:   no distress, normal body habitus   ENT/Mouth: membranes moist, no oral lesions or bleeding gums.      EYES:  no scleral icterus, normal conjunctivae   Neck: no carotid bruits or thyromegaly   Chest/Lungs:   lungs are clear to auscultation, no rales or wheezing, equal chest wall expansion    Cardiovascular:    Normal first and second heart sounds with no murmurs, rubs, or gallops; Jugular venous pressure flat; no edema bilaterally    Abdomen:  no organomegaly, masses, bruits, or tenderness; bowel sounds are present   Extremities   no cyanosis or clubbing    CMS intact.   Skin: no xanthelasma, warm.    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 Cody at [facility], getting chemo again in 2017    Nonocclusive coronary atherosclerosis of native coronary artery     nonobstuctive by cor angio      SIRIA on CPAP 2013    he sees someone at [facility]    Other primary cardiomyopathies " 2013    EF was as low as 23%    Statin intolerance 02/21/2019    history of elevated CPK on both simvastatin but less so on atorvastatin.    Type 2 diabetes mellitus (H)     Past Surgical History:   Procedure Laterality Date    CARDIAC DEFIBRILLATOR PLACEMENT  07/24/2013    Dr. Rodriguez    CATARACT EXTRACTION W/ INTRAOCULAR LENS  IMPLANT, BILATERAL  05/2018    CYSTOSCOPY W/ LITHOLAPAXY / EHL N/A 03/19/2019    Procedure: CYSTOLITHOLAPAXY;  Surgeon: Yunior Moore MD;  Location: [facility];  Service: Urology    HERNIA REPAIR Left     AZ CYSTO/URETERO W/LITHOTRIPSY &INDWELL STENT INSRT Right 03/19/2019    Procedure: CYSTOSCOPY RIGHT URETEROSCOPY LASER LITHOTRIPSY;  Surgeon: Yunior Moore MD;  Location: [facility];  Service: Urology    TOTAL HIP ARTHROPLASTY Right 2012    URETEROSCOPY      Family History   Problem Relation Age of Onset    Rheumatic Heart Disease Brother         half brother    Dementia Brother     Cerebrovascular Disease Father     Rheumatic Heart Disease Sister     Sudden Death Sister 67.00        no autopsy    CABG Sister 81.00    No Known Problems Daughter     No Known Problems Daughter     No Known Problems Daughter     No Known Problems Granddaughter     No Known Problems Grandson     Urolithiasis No family hx of     Clotting Disorder No family hx of     Gout No family hx of     Social History     Socioeconomic History    Marital status:      Spouse name: Not on file    Number of children: 3    Years of education: Not on file    Highest education level: Not on file   Occupational History    Not on file   Tobacco Use    Smoking status: Never    Smokeless tobacco: Never   Vaping Use    Vaping status: Never Used   Substance and Sexual Activity    Alcohol use: No    Drug use: No    Sexual activity: Not on file   Other Topics Concern    Not on file   Social History Narrative    He used to walk for an hour most days and still takes care of his lawn and snow removal, though  his grandsons are helping more with the lawn in 2018. 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 He is walking less in 2019. His wife,  Rebeka, is 6 years his tu and is providing daytime and overnight  for their daughter Loree's fraternal twins, Estrella and Jovanny, since 2019. Anthony spends 5 hours a day with Loree and the twins.     Social Drivers of Health     Financial Resource Strain: Low Risk  (10/23/2024)    Financial Resource Strain     Within the past 12 months, have you or your family members you live with been unable to get utilities (heat, electricity) when it was really needed?: No   Food Insecurity: Low Risk  (10/23/2024)    Food Insecurity     Within the past 12 months, did you worry that your food would run out before you got money to buy more?: No     Within the past 12 months, did the food you bought just not last and you didn t have money to get more?: No   Transportation Needs: Low Risk  (10/23/2024)    Transportation Needs     Within the past 12 months, has lack of transportation kept you from medical appointments, getting your medicines, non-medical meetings or appointments, work, or from getting things that you need?: No   Physical Activity: Not on file   Stress: Not on file   Social Connections: Unknown (1/1/2022)    Received from [facility], [facility]    Social Connections     Frequency of Communication with Friends and Family: Not on file   Interpersonal Safety: Low Risk  (10/23/2024)    Interpersonal Safety     Do you feel physically and emotionally safe where you currently live?: Yes     Within the past 12 months, have you been hit, slapped, kicked or otherwise physically hurt by someone?: No     Within the past 12 months, have you been humiliated or emotionally abused in other ways by your partner or ex-partner?: No   Housing Stability: Low Risk  (10/23/2024)    Housing  Stability     Do you have housing? : Yes     Are you worried about losing your housing?: No          Medications  Allergies   Current Outpatient Medications   Medication Sig Dispense Refill    aspirin 81 MG EC tablet [ASPIRIN 81 MG EC TABLET] Take 81 mg by mouth bedtime.      atorvastatin (LIPITOR) 10 MG tablet Take 10 mg by mouth at bedtime.      carbidopa-levodopa (SINEMET)  MG tablet Take 2 tablets by mouth every morning. (Also taking 1 tablet midday and 1 tablet at bedtime)      CONTOUR NEXT TEST test strip USE TO TEST BLOOD SUGAR DAILY      furosemide (LASIX) 20 MG tablet Take 0.5 tablets (10 mg) by mouth daily. 15 tablet 0    LANTUS VIAL 100 UNIT/ML soln Inject 10 Units subcutaneously at bedtime.      lisinopril (ZESTRIL) 2.5 MG tablet Take 1 tablet (2.5 mg) by mouth daily. 30 tablet 0    metFORMIN (GLUCOPHAGE) 500 MG tablet Take 500 mg by mouth 2 times daily (with meals).      metoprolol succinate ER (TOPROL XL) 100 MG 24 hr tablet TAKE 1 TABLET AT BEDTIME 90 tablet 1    UNABLE TO FIND Spray 1 Application in nostril at bedtime. MEDICATION NAME: CPAP      Allergies   Allergen Reactions    Simvastatin Other (See Comments)     Elevated CPK         Lab Results    Chemistry/lipid CBC Cardiac Enzymes/BNP/TSH/INR   Lab Results   Component Value Date    CHOL 109 06/04/2024    HDL 28 (L) 06/04/2024    TRIG 359 (H) 06/04/2024    BUN 26.8 (H) 11/13/2024     11/13/2024    CO2 25 11/13/2024    Lab Results   Component Value Date    WBC 8.2 10/22/2024    HGB 12.3 (L) 10/22/2024    HCT 37.7 (L) 10/22/2024    MCV 91 10/22/2024     10/22/2024    Lab Results   Component Value Date    TROPONINI 0.02 04/10/2021     (H) 06/14/2021    TSH 0.81 01/26/2023        50  minutes spent on the date of encounter doing chart review, review of outside records, review of test results, interpretation with above tests, patient visit, documentation, and discussion with family.        This note has been dictated using  voice recognition software. Any grammatical, typographical, or context distortions are unintentional and inherent to the software

## 2024-11-13 NOTE — PROGRESS NOTES
[facility]: Heart Failure Care Coordination   Heart Failure Education    Situation/Background:      RN CC provided heart failure education to pt and his dtr Kenia during clinic visit.    Assessment:      Living situation: home with family (wife)    Barriers to Heart Failure follow-up:  none identified at this time    Medication management: independent    Pt has a scale at home and has been taking his weight regularly and tracking it in a log. Encouraged pt to continue. Pt does most the grocery shopping and his wife does the cooking. We reviewed high sodium food products, reading nutrition labels, utilizing sodium free seasonings, [employer] meal program. They are interested, so provided application and instruction. Pt doesn't always drink enough water per dtr's report. We reviewed goal of fluid intake 50-60oz daily, to support adequate hydration, but prevent fluid retention. We reviewed utilizing a consistent sized water bottle or glass to more easily monitor fluid intake.    Intervention/Plan:      CM/HF education topics reviewed:  Low sodium: 2000 mg or less daily, meal choices and label reading   Fluid Restriction: 50-60oz daily   Daily weight monitoring and logging   Overview of heart failure appointments and testing   Symptoms of HF to be reported to Core Team      Education materials provided:  Low sodium food and drink handout  Low sodium food product examples  Already prepared low salt meal delivery services handout  HF stoplight tool  Cardiac medication handout     HF resources reviewed:  [employer]      Patient to follow up as scheduled.  RN CC reviewed and reinforced fluid/dietary sodium restrictions; patient stated understanding.  Instructed patient to call RN line with new or worsening heart failure symptoms and/or rapid weight gain.     Patient and dtr expressed understanding of above education/instructions and denied further questions at this time.     PAPITO Ward RN

## 2024-11-13 NOTE — LETTER
11/13/2024    Alejandro Meredith MD  [facility] [address] [place] [address]    RE: Anthony Tiwari       Dear Colleague,     I had the pleasure of seeing Anthony Tiwari in the [facility].          Assessment/Recommendations   Assessment:    #   History of idiopathic cardiomyopathy with status post ICD, Acute on chronic systolic heart failure, NYHA Class NYHA Class I-II:  Patient was hospitalized in September with community-acquired pneumonia.    Patient was rehospitalized in October with decompensated heart failure.  Echocardiogram showed LVEF reduced to 25 to 30%.  Previous echo showed LVEF of 35 to 40% per echo in January 2022.    Patient was treated with IV diuretic therapy and was discharged on oral furosemide.    Heart failure regimen includes:    -Beta-blocker therapy with metoprolol succinate 100 mg daily    -ACEI  therapy with lisinopril 2.5 mg daily    -Not on aldosterone blocker/MRA therapy    -Not on SGLT2 Inhibitor    -Diuretic therapy with furosemide 10 mg daily    We discussed and reviewed about heart failure, medication management, and lifestyle management including low sodium diet <2 g/day, daily weight, and staying physically active as tolerated. Patient met with [facility] CORE nurse clinician for heart failure education.    # NSVT: Recent device interrogation showed increased burden of NSVT.    #  Hypertension: Blood pressure stable.    # Acute on chronic kidney disease stage IIIb:BMP on 10/20/2024 showed creatinine of 1.90 and BUN 40.    # Obstructive sleep apnea: Using CPAP regularly.    # Moderate coronary artery calcification per chest CT on 11/11/2024: Previously noted mild calcification.  No chest pain.  Recent hospitalization with acute systolic heart failure with further worsening LVEF and increased burden of NSVT.    Plan/Recommendation:  -BMP pending  -Enroll in [employer] heart failure diet program- application provided  -With further decline in  "LVEF with shortness of breath and increased burden of NSVT, we discussed about repeating nuclear stress test for further ischemic eval.  Patient is agreeable.   - Schedule Lexiscan stress test few days prior to follow-up with Dr. Reyez  -Continue on low-sodium diet <2000 mg per day, daily weight monitoring, and maintain fluid intake at 50 to 60 ounces per day    Follow up with Dr. Rice or TINO Myles in 1-2 months. Follow up with me in 3-4 months     The longitudinal plan of care for acute on chronic systolic heart failure was addressed during this visit.?Due to the added   complexity in care, I will continue to support Anthony Tiwari in the subsequent management of this   condition(s) and with the ongoing continuity of care of this condition(s)\".     History of Present Illness/Subjective    Mr. Anthony Tiwari is a 89 year old male with a past medical history of nonischemic cardiomyopathy status post ICD, NSVT, recent hospitalization with pneumonia in September, and rehospitalization in October with acute decompensated heart failure and acute on chronic kidney disease who is seen at [facility] for post hospitalization heart failure follow up.     Today, Dic reports feeling much improvement in his shortness of breath and heart failure symptoms since recent hospitalization.  He does experience some shortness of breath on exertion.  He denies fatigue, lightheadedness, shortness of breath, orthopnea, PND, palpitations, chest pain, abdominal fullness/bloating, and lower extremity edema.        ECHO from 10/2024-Reviewed:   Interpretation Summary     Left ventricular function is decreased. The ejection fraction is 25-30%  (severely reduced).  Normal right ventricle size and systolic function.  The left atrium is moderate to severely dilated.  The right atrium is mild to moderately dilated.  There is mild (1+) aortic regurgitation.  IVC diameter <2.1 cm collapsing >50% with sniff " "suggests a normal RA pressure  of 3 mmHg.  The patient exhibited frequent PVCs.     Physical Examination Review of Systems   /56 (BP Location: Left arm, Patient Position: Sitting, Cuff Size: Adult Regular)   Pulse 60   Resp 20   Ht 1.685 m (5' 6.34\")   Wt 74.6 kg (164 lb 8 oz)   SpO2 98%   BMI 26.28 kg/m    Body mass index is 26.28 kg/m .  Wt Readings from Last 3 Encounters:   11/13/24 74.6 kg (164 lb 8 oz)   11/11/24 75.8 kg (167 lb)   10/23/24 74.9 kg (165 lb 1.6 oz)     General Appearance:   no distress, normal body habitus   ENT/Mouth: membranes moist, no oral lesions or bleeding gums.      EYES:  no scleral icterus, normal conjunctivae   Neck: no carotid bruits or thyromegaly   Chest/Lungs:   lungs are clear to auscultation, no rales or wheezing, equal chest wall expansion    Cardiovascular:    Normal first and second heart sounds with no murmurs, rubs, or gallops; Jugular venous pressure flat; no edema bilaterally    Abdomen:  no organomegaly, masses, bruits, or tenderness; bowel sounds are present   Extremities   no cyanosis or clubbing    CMS intact.   Skin: no xanthelasma, warm.    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 Cody at " [facility], getting chemo again in 2017     Nonocclusive coronary atherosclerosis of native coronary artery     nonobstuctive by cor angio       SIRIA on CPAP 2013    he sees someone at [facility]     Other primary cardiomyopathies 2013    EF was as low as 23%     Statin intolerance 02/21/2019    history of elevated CPK on both simvastatin but less so on atorvastatin.     Type 2 diabetes mellitus (H)     Past Surgical History:   Procedure Laterality Date     CARDIAC DEFIBRILLATOR PLACEMENT  07/24/2013    Dr. Rodriguez     CATARACT EXTRACTION W/ INTRAOCULAR LENS  IMPLANT, BILATERAL  05/2018     CYSTOSCOPY W/ LITHOLAPAXY / EHL N/A 03/19/2019    Procedure: CYSTOLITHOLAPAXY;  Surgeon: Yunior Moore MD;  Location: [facility];  Service: Urology     HERNIA REPAIR Left      LA CYSTO/URETERO W/LITHOTRIPSY &INDWELL STENT INSRT Right 03/19/2019    Procedure: CYSTOSCOPY RIGHT URETEROSCOPY LASER LITHOTRIPSY;  Surgeon: Yunior Moore MD;  Location: [facility];  Service: Urology     TOTAL HIP ARTHROPLASTY Right 2012     URETEROSCOPY      Family History   Problem Relation Age of Onset     Rheumatic Heart Disease Brother         half brother     Dementia Brother      Cerebrovascular Disease Father      Rheumatic Heart Disease Sister      Sudden Death Sister 67.00        no autopsy     CABG Sister 81.00     No Known Problems Daughter      No Known Problems Daughter      No Known Problems Daughter      No Known Problems Granddaughter      No Known Problems Grandson      Urolithiasis No family hx of      Clotting Disorder No family hx of      Gout No family hx of     Social History     Socioeconomic History     Marital status:      Spouse name: Not on file     Number of children: 3     Years of education: Not on file     Highest education level: Not on file   Occupational History     Not on file   Tobacco Use     Smoking status: Never     Smokeless tobacco: Never   Vaping Use     Vaping status: Never  Used   Substance and Sexual Activity     Alcohol use: No     Drug use: No     Sexual activity: Not on file   Other Topics Concern     Not on file   Social History Narrative    He used to walk for an hour most days and still takes care of his lawn and snow removal, though his grandsons are helping more with the lawn in 2018. 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 He is walking less in 2019. His wife,  Rebeka, is 6 years his tu and is providing daytime and overnight  for their daughter Loree's fraternal twins, Estrella and Jovanny, since 2019. Anthony spends 5 hours a day with Loree and the twins.     Social Drivers of Health     Financial Resource Strain: Low Risk  (10/23/2024)    Financial Resource Strain      Within the past 12 months, have you or your family members you live with been unable to get utilities (heat, electricity) when it was really needed?: No   Food Insecurity: Low Risk  (10/23/2024)    Food Insecurity      Within the past 12 months, did you worry that your food would run out before you got money to buy more?: No      Within the past 12 months, did the food you bought just not last and you didn t have money to get more?: No   Transportation Needs: Low Risk  (10/23/2024)    Transportation Needs      Within the past 12 months, has lack of transportation kept you from medical appointments, getting your medicines, non-medical meetings or appointments, work, or from getting things that you need?: No   Physical Activity: Not on file   Stress: Not on file   Social Connections: Unknown (1/1/2022)    [facility]    Social Connections      Frequency of Communication with Friends and Family: Not on file   Interpersonal Safety: Low Risk  (10/23/2024)    Interpersonal Safety      Do you feel physically and emotionally safe where you currently live?: Yes      Within the past 12  months, have you been hit, slapped, kicked or otherwise physically hurt by someone?: No      Within the past 12 months, have you been humiliated or emotionally abused in other ways by your partner or ex-partner?: No   Housing Stability: Low Risk  (10/23/2024)    Housing Stability      Do you have housing? : Yes      Are you worried about losing your housing?: No          Medications  Allergies   Current Outpatient Medications   Medication Sig Dispense Refill     aspirin 81 MG EC tablet [ASPIRIN 81 MG EC TABLET] Take 81 mg by mouth bedtime.       atorvastatin (LIPITOR) 10 MG tablet Take 10 mg by mouth at bedtime.       carbidopa-levodopa (SINEMET)  MG tablet Take 2 tablets by mouth every morning. (Also taking 1 tablet midday and 1 tablet at bedtime)       CONTOUR NEXT TEST test strip USE TO TEST BLOOD SUGAR DAILY       furosemide (LASIX) 20 MG tablet Take 0.5 tablets (10 mg) by mouth daily. 15 tablet 0     LANTUS VIAL 100 UNIT/ML soln Inject 10 Units subcutaneously at bedtime.       lisinopril (ZESTRIL) 2.5 MG tablet Take 1 tablet (2.5 mg) by mouth daily. 30 tablet 0     metFORMIN (GLUCOPHAGE) 500 MG tablet Take 500 mg by mouth 2 times daily (with meals).       metoprolol succinate ER (TOPROL XL) 100 MG 24 hr tablet TAKE 1 TABLET AT BEDTIME 90 tablet 1     UNABLE TO FIND Spray 1 Application in nostril at bedtime. MEDICATION NAME: CPAP      Allergies   Allergen Reactions     Simvastatin Other (See Comments)     Elevated CPK         Lab Results    Chemistry/lipid CBC Cardiac Enzymes/BNP/TSH/INR   Lab Results   Component Value Date    CHOL 109 06/04/2024    HDL 28 (L) 06/04/2024    TRIG 359 (H) 06/04/2024    BUN 26.8 (H) 11/13/2024     11/13/2024    CO2 25 11/13/2024    Lab Results   Component Value Date    WBC 8.2 10/22/2024    HGB 12.3 (L) 10/22/2024    HCT 37.7 (L) 10/22/2024    MCV 91 10/22/2024     10/22/2024    Lab Results   Component Value Date    TROPONINI 0.02 04/10/2021     (H)  06/14/2021    TSH 0.81 01/26/2023        50  minutes spent on the date of encounter doing chart review, review of outside records, review of test results, interpretation with above tests, patient visit, documentation, and discussion with family.        This note has been dictated using voice recognition software. Any grammatical, typographical, or context distortions are unintentional and inherent to the software          Thank you for allowing me to participate in the care of your patient.      Sincerely,     MELANI Byrne [facility]  cc:   Reilly Alonso MD  [address]  [place] [address]

## 2024-11-13 NOTE — PATIENT INSTRUCTIONS
Anthony Tiwari,    It was a pleasure to see you today at the [facility].     My recommendations after this visit include:    - No medications changes made today    - We will follow up with your lab result    -Schedule Lexiscan stress test few days prior to follow-up with Dr. Reyez or CURTIS Phelps    - Follow up with Dr. Reyez or CURTIS Cordero in 1-2 months     - Follow up with Apryl Jon CNP  in 3-4 months     - Please call Heart Failure Nurse Line at [phone], if you have any questions or concerns      Apryl Jon CNP       What is the [facility] Heart Failure Program?     The [facility] Heart Failure Program is aheart failure specialty clinic within [facility].  You will work with your cardiologist, nurse practitioner, and nurses to carefully adjust medications and learn how to live with heart failure.  The Heart FailureProgram will help you:    Better understand your chronic heart condition  Feel better and avoid hospital stays    Monitoring for Symptoms      Call the Heart Failure Phone Line ([phone]) if you have any of these symptoms:   Increased shortness of breath/shortness ofbreath at rest  Waking up at night with difficulty breathing  Unable to lie down for sleep due to symptoms or needing to sit uprightfor sleep  Weight gain of 2 pounds a day for 2 days in a row OR 5 pounds in 1 week  Increased swelling in your ankles or legs  Dizziness or lightheadedness    Medications     Take your medications as prescribed  Bring all your medications in their original bottles to every appointment  Avoid non-steroidal anti-inflammatory medications (Advil,Aleve, Ibuprofen, Naprosyn, Naproxen, Celebrex)  Do not stop taking your medications or begin taking over-the-counter or herbal medications without first talking to your doctor ornurse practitioner    Diet and Lifestyle     Limit sodium/salt to 2000 mg daily   Read food labels for sodium content  Do not  add salt when cooking or add salt at the table  Weigh yourself every day and record in your daily weight log   Call if you gain 2 pounds a day for 2 days in a row OR 5 pounds in 1 week  Bring daily weight log to every appointment  Stay active, pace yourself, listen to yourbody, and rest when tired  Elevate your legs if they are swollen. Ask about using compression/support stockings  Stop smoking  Lose weight if you are overweight  Avoid drinking alcohol or limit amount  Stay updated on your immunizations including flu and pneumonia vaccines

## 2024-11-14 ENCOUNTER — TELEPHONE (OUTPATIENT)
Dept: CARDIOLOGY | Facility: CLINIC | Age: 89
End: 2024-11-14
Payer: MEDICARE

## 2024-11-14 DIAGNOSIS — I50.23 ACUTE ON CHRONIC SYSTOLIC HEART FAILURE (H): Primary | ICD-10-CM

## 2024-11-14 RX ORDER — FUROSEMIDE 20 MG/1
10 TABLET ORAL DAILY
Qty: 45 TABLET | Refills: 2 | Status: SHIPPED | OUTPATIENT
Start: 2024-11-14

## 2024-11-14 RX ORDER — LISINOPRIL 2.5 MG/1
2.5 TABLET ORAL DAILY
Qty: 90 TABLET | Refills: 2 | Status: SHIPPED | OUTPATIENT
Start: 2024-11-14

## 2024-11-14 NOTE — TELEPHONE ENCOUNTER
----- Message from Apryl Jon sent at 11/13/2024  4:57 PM CST -----  Cr improved.  Continue on Furosemide 10 mg daily and lisinopril 2.5 mg daily- will need refill.  Repeat BMP in 4 weeks.  Call us back if persistent weight gain with worsening heart failure symptoms  Thank you!  CY

## 2024-11-14 NOTE — TELEPHONE ENCOUNTER
Spoke w/ pt and updated him w/ lab results and recommendations from Apryl. Pt verbalized understanding. He requested I call his dtr Kenia to update her as well, I called and LM to call me back to discuss. Pt will continue lisinopril and furosemide at current doses. Refills sent. Pt will plan to go to the OP lab at [facility] in 4 weeks for repeat BMP. Order placed.      PAPITO Ward RN

## 2024-11-15 NOTE — TELEPHONE ENCOUNTER
"No call back from Kenia. Tried calling number again, no answer and  doesn't identify her, but says something like \"the [employer] customer is not available\".    Called pt back, no answer. Left detailed message to call back or pass on our number to Kenia for her to call us directly to review lab results and recommendations that were communicated to the pt yesterday. Provided number, [phone].    PAPITO Ward RN   "

## 2024-11-27 DIAGNOSIS — I42.8 NICM (NONISCHEMIC CARDIOMYOPATHY) (H): ICD-10-CM

## 2024-11-27 RX ORDER — METOPROLOL SUCCINATE 100 MG/1
100 TABLET, EXTENDED RELEASE ORAL AT BEDTIME
Qty: 90 TABLET | Refills: 0 | Status: SHIPPED | OUTPATIENT
Start: 2024-11-27

## 2024-12-04 NOTE — TELEPHONE ENCOUNTER
No call back received from pt or dtr in last couple weeks.     Called pt today to close the loop and spoke w/ him. He ended up passing the lab results and recommendations from Apryl onto his dtr Kenia and he confirmed that the numbers on his chart for her are correct. He has plans to go to the OP lab at [facility] next week for repeat BMP per Apryl. Pt reports feeling quite well lately.     Noted that pt has follow up scheduled w/ AMANDA Cordero on 1/3/25, but not yet scheduled for Lexiscan which was Apryl's recommendation at her visit and wanted completed prior to that appt. Pt agreeable to scheduling it, transferred to scheduling & down for 12/11.     No further questions or concerns at this time, will close this encounter.     PAPITO Ward RN

## 2024-12-11 ENCOUNTER — HOSPITAL ENCOUNTER (OUTPATIENT)
Dept: CARDIOLOGY | Facility: CLINIC | Age: 89
Discharge: HOME | End: 2024-12-11
Attending: NURSE PRACTITIONER
Payer: MEDICARE

## 2024-12-11 ENCOUNTER — HOSPITAL ENCOUNTER (OUTPATIENT)
Dept: NUCLEAR MEDICINE | Facility: CLINIC | Age: 89
Discharge: HOME | End: 2024-12-11
Attending: NURSE PRACTITIONER
Payer: MEDICARE

## 2024-12-11 DIAGNOSIS — I25.10 CORONARY ARTERY CALCIFICATION SEEN ON CT SCAN: ICD-10-CM

## 2024-12-11 DIAGNOSIS — I47.29 NSVT (NONSUSTAINED VENTRICULAR TACHYCARDIA) (H): ICD-10-CM

## 2024-12-11 DIAGNOSIS — I10 ESSENTIAL HYPERTENSION: Chronic | ICD-10-CM

## 2024-12-11 DIAGNOSIS — R06.09 DOE (DYSPNEA ON EXERTION): ICD-10-CM

## 2024-12-11 DIAGNOSIS — I42.9 IDIOPATHIC CARDIOMYOPATHY (H): Chronic | ICD-10-CM

## 2024-12-11 DIAGNOSIS — I50.23 ACUTE ON CHRONIC SYSTOLIC HEART FAILURE (H): ICD-10-CM

## 2024-12-11 LAB
CV STRESS CURRENT BP HE: NORMAL
CV STRESS CURRENT HR HE: 60
CV STRESS CURRENT HR HE: 60
CV STRESS CURRENT HR HE: 65
CV STRESS CURRENT HR HE: 67
CV STRESS CURRENT HR HE: 68
CV STRESS CURRENT HR HE: 68
CV STRESS CURRENT HR HE: 69
CV STRESS CURRENT HR HE: 70
CV STRESS CURRENT HR HE: 71
CV STRESS CURRENT HR HE: 71
CV STRESS CURRENT HR HE: 72
CV STRESS CURRENT HR HE: 72
CV STRESS CURRENT HR HE: 74
CV STRESS CURRENT HR HE: 75
CV STRESS CURRENT HR HE: 76
CV STRESS CURRENT HR HE: 76
CV STRESS DEVIATION TIME HE: NORMAL
CV STRESS ECHO PERCENT HR HE: NORMAL
CV STRESS EXERCISE STAGE HE: NORMAL
CV STRESS FINAL RESTING BP HE: NORMAL
CV STRESS FINAL RESTING HR HE: 71
CV STRESS MAX HR HE: 79
CV STRESS MAX TREADMILL GRADE HE: 0
CV STRESS MAX TREADMILL SPEED HE: 0
CV STRESS PEAK DIA BP HE: NORMAL
CV STRESS PEAK SYS BP HE: NORMAL
CV STRESS PHASE HE: NORMAL
CV STRESS PROTOCOL HE: NORMAL
CV STRESS RESTING PT POSITION HE: NORMAL
CV STRESS ST DEVIATION AMOUNT HE: NORMAL
CV STRESS ST DEVIATION ELEVATION HE: NORMAL
CV STRESS ST EVELATION AMOUNT HE: NORMAL
CV STRESS TEST TYPE HE: NORMAL
CV STRESS TOTAL STAGE TIME MIN 1 HE: NORMAL
NUC STRESS EJECTION FRACTION: 37 %
RATE PRESSURE PRODUCT: 8769
STRESS ECHO BASELINE DIASTOLIC HE: 55
STRESS ECHO BASELINE HR: 65
STRESS ECHO BASELINE SYSTOLIC BP: 107
STRESS ECHO CALCULATED PERCENT HR: 60 %
STRESS ECHO LAST STRESS DIASTOLIC BP: 54
STRESS ECHO LAST STRESS HR: 72
STRESS ECHO LAST STRESS SYSTOLIC BP: 111
STRESS ECHO TARGET HR: 131

## 2024-12-11 PROCEDURE — 78452 HT MUSCLE IMAGE SPECT MULT: CPT | Mod: 26 | Performed by: STUDENT IN AN ORGANIZED HEALTH CARE EDUCATION/TRAINING PROGRAM

## 2024-12-11 PROCEDURE — 93016 CV STRESS TEST SUPVJ ONLY: CPT | Performed by: INTERNAL MEDICINE

## 2024-12-11 PROCEDURE — 250N000011 HC RX IP 250 OP 636: Performed by: NURSE PRACTITIONER

## 2024-12-11 PROCEDURE — 343N000001 HC RX 343 MED OP 636: Performed by: NURSE PRACTITIONER

## 2024-12-11 PROCEDURE — 78452 HT MUSCLE IMAGE SPECT MULT: CPT

## 2024-12-11 PROCEDURE — A9500 TC99M SESTAMIBI: HCPCS | Performed by: NURSE PRACTITIONER

## 2024-12-11 PROCEDURE — 93017 CV STRESS TEST TRACING ONLY: CPT

## 2024-12-11 PROCEDURE — 93018 CV STRESS TEST I&R ONLY: CPT | Performed by: STUDENT IN AN ORGANIZED HEALTH CARE EDUCATION/TRAINING PROGRAM

## 2024-12-11 RX ORDER — AMINOPHYLLINE 25 MG/ML
50-100 INJECTION, SOLUTION INTRAVENOUS
Status: DISCONTINUED | OUTPATIENT
Start: 2024-12-11 | End: 2024-12-12 | Stop reason: HOSPADM

## 2024-12-11 RX ORDER — REGADENOSON 0.08 MG/ML
0.4 INJECTION, SOLUTION INTRAVENOUS ONCE
Status: COMPLETED | OUTPATIENT
Start: 2024-12-11 | End: 2024-12-11

## 2024-12-11 RX ADMIN — Medication 30.4 MILLICURIE: at 09:30

## 2024-12-11 RX ADMIN — REGADENOSON 0.4 MG: 0.08 INJECTION, SOLUTION INTRAVENOUS at 09:17

## 2024-12-11 RX ADMIN — Medication 7.5 MILLICURIE: at 08:37

## 2024-12-12 ENCOUNTER — LAB (OUTPATIENT)
Dept: LAB | Facility: CLINIC | Age: 89
End: 2024-12-12
Payer: MEDICARE

## 2024-12-12 DIAGNOSIS — I50.23 ACUTE ON CHRONIC SYSTOLIC HEART FAILURE (H): ICD-10-CM

## 2024-12-12 LAB
ANION GAP SERPL CALCULATED.3IONS-SCNC: 14 MMOL/L (ref 7–15)
BUN SERPL-MCNC: 26.9 MG/DL (ref 8–23)
CALCIUM SERPL-MCNC: 9.7 MG/DL (ref 8.8–10.4)
CHLORIDE SERPL-SCNC: 102 MMOL/L (ref 98–107)
CREAT SERPL-MCNC: 1.18 MG/DL (ref 0.67–1.17)
EGFRCR SERPLBLD CKD-EPI 2021: 59 ML/MIN/1.73M2
GLUCOSE SERPL-MCNC: 158 MG/DL (ref 70–99)
HCO3 SERPL-SCNC: 24 MMOL/L (ref 22–29)
POTASSIUM SERPL-SCNC: 4.8 MMOL/L (ref 3.4–5.3)
SODIUM SERPL-SCNC: 140 MMOL/L (ref 135–145)

## 2024-12-12 PROCEDURE — 80048 BASIC METABOLIC PNL TOTAL CA: CPT

## 2024-12-12 PROCEDURE — 36415 COLL VENOUS BLD VENIPUNCTURE: CPT

## 2024-12-13 ENCOUNTER — TELEPHONE (OUTPATIENT)
Dept: CARDIOLOGY | Facility: CLINIC | Age: 89
End: 2024-12-13
Payer: MEDICARE

## 2024-12-13 DIAGNOSIS — I50.9 ACUTE DECOMPENSATED HEART FAILURE (H): Primary | ICD-10-CM

## 2024-12-13 RX ORDER — SPIRONOLACTONE 25 MG/1
12.5 TABLET ORAL DAILY
Qty: 90 TABLET | Refills: 1 | Status: SHIPPED | OUTPATIENT
Start: 2024-12-13

## 2024-12-13 NOTE — TELEPHONE ENCOUNTER
----- Message from Apryl Jon sent at 12/12/2024 12:25 PM CST -----  Stable renal function with Cr improved.  Start Spironolactone 12.5 mg daily.  Call us if develop lightheaded, dizziness or any new side effects.  BMP in a week.  If pt declines, continue current HF regimen.  Thank you!  CY

## 2024-12-13 NOTE — TELEPHONE ENCOUNTER
Called patient and relayed update.    Wt: 165 lbs    HF S/S: denies exacerbation.     MAR updated:  Spironolactone 25 MG.  Take 0.5 tablet (12.5 mg) daily    BMP ordered and anticipated 12/20/2024    Thank you    Mayur Mccabe RN    ------------------------------------    Apryl Jon APRN CNP  P [facility] Core  Stable renal function with Cr improved.  Start Spironolactone 12.5 mg daily.  Call us if develop lightheaded, dizziness or any new side effects.  BMP in a week.  If pt declines, continue current HF regimen.  Thank you!  CY

## 2024-12-20 ENCOUNTER — LAB (OUTPATIENT)
Dept: LAB | Facility: CLINIC | Age: 89
End: 2024-12-20
Payer: MEDICARE

## 2024-12-20 DIAGNOSIS — I50.9 ACUTE DECOMPENSATED HEART FAILURE (H): ICD-10-CM

## 2024-12-20 LAB
ANION GAP SERPL CALCULATED.3IONS-SCNC: 11 MMOL/L (ref 7–15)
BUN SERPL-MCNC: 34.2 MG/DL (ref 8–23)
CALCIUM SERPL-MCNC: 9.8 MG/DL (ref 8.8–10.4)
CHLORIDE SERPL-SCNC: 104 MMOL/L (ref 98–107)
CREAT SERPL-MCNC: 1.19 MG/DL (ref 0.67–1.17)
EGFRCR SERPLBLD CKD-EPI 2021: 58 ML/MIN/1.73M2
GLUCOSE SERPL-MCNC: 159 MG/DL (ref 70–99)
HCO3 SERPL-SCNC: 23 MMOL/L (ref 22–29)
POTASSIUM SERPL-SCNC: 5 MMOL/L (ref 3.4–5.3)
SODIUM SERPL-SCNC: 138 MMOL/L (ref 135–145)

## 2024-12-20 PROCEDURE — 80048 BASIC METABOLIC PNL TOTAL CA: CPT

## 2024-12-20 PROCEDURE — 36415 COLL VENOUS BLD VENIPUNCTURE: CPT

## 2024-12-24 ENCOUNTER — TELEPHONE (OUTPATIENT)
Dept: CARDIOLOGY | Facility: CLINIC | Age: 89
End: 2024-12-24
Payer: MEDICARE

## 2024-12-24 DIAGNOSIS — I50.23 ACUTE ON CHRONIC SYSTOLIC HEART FAILURE (H): Primary | ICD-10-CM

## 2024-12-24 NOTE — TELEPHONE ENCOUNTER
Noted.  PAPITO Ward RN     -----------------------------------------    Apryl Jon APRN CNP Strom, Tayva M, RN  Caller: Unspecified (Today, 10:22 AM)  Thank you

## 2024-12-24 NOTE — TELEPHONE ENCOUNTER
----- Message from Apryl Jon sent at 12/23/2024 11:33 AM CST -----  Stable lab since started on Spironolactone.  If tolerating, continue current HF regimen.  BMP in a 3-4 weeks.  Thank you!  CY

## 2024-12-24 NOTE — TELEPHONE ENCOUNTER
Spoke w/ pt and updated him w/ stable lab results and recommendations from Apryl. Pt verbalized understanding, but had not yet started spironolactone.     Apryl-  Pt may not have understood the instructions for starting spironolactone very well. He reports that he has not yet started the medication. He wasn't sure when he was supposed to take it and he got it from the mail order pharmacy. Pt will start spironolactone 12.5mg (1/2 tablet) once daily in the AM, today. He will plan to use the OP lab at [facility] on Mon 12/30 for repeat BMP. He was provided HF nurse line number w/ instruction to call if any lightheadedness, dizziness, new side effects. He also confirmed appt w/ Shameka on 1/3.    PAPITO Ward RN

## 2024-12-26 NOTE — TELEPHONE ENCOUNTER
Patient called and left a voicemail stating he missed a call from the clinic.  After reviewing patient's recent tele encounter we called him back to confirm the following:     Spironolactone 12.5 mg has been started    WW outpatient lat anticipated 12/30/2024    Patient has no further questions.    Thank you    Mayur Mccabe RN

## 2024-12-30 ENCOUNTER — TELEPHONE (OUTPATIENT)
Dept: CARDIOLOGY | Facility: CLINIC | Age: 89
End: 2024-12-30

## 2024-12-30 ENCOUNTER — LAB (OUTPATIENT)
Dept: LAB | Facility: CLINIC | Age: 89
End: 2024-12-30
Payer: MEDICARE

## 2024-12-30 ENCOUNTER — TRANSFERRED RECORDS (OUTPATIENT)
Dept: HEALTH INFORMATION MANAGEMENT | Facility: CLINIC | Age: 89
End: 2024-12-30

## 2024-12-30 DIAGNOSIS — N18.32 STAGE 3B CHRONIC KIDNEY DISEASE (H): ICD-10-CM

## 2024-12-30 DIAGNOSIS — I50.23 ACUTE ON CHRONIC SYSTOLIC HEART FAILURE (H): ICD-10-CM

## 2024-12-30 DIAGNOSIS — I50.23 ACUTE ON CHRONIC SYSTOLIC HEART FAILURE (H): Primary | ICD-10-CM

## 2024-12-30 LAB
ANION GAP SERPL CALCULATED.3IONS-SCNC: 12 MMOL/L (ref 7–15)
BUN SERPL-MCNC: 34.8 MG/DL (ref 8–23)
CALCIUM SERPL-MCNC: 9.5 MG/DL (ref 8.8–10.4)
CHLORIDE SERPL-SCNC: 105 MMOL/L (ref 98–107)
CREAT SERPL-MCNC: 1.15 MG/DL (ref 0.67–1.17)
EGFRCR SERPLBLD CKD-EPI 2021: 61 ML/MIN/1.73M2
GLUCOSE SERPL-MCNC: 153 MG/DL (ref 70–99)
HCO3 SERPL-SCNC: 20 MMOL/L (ref 22–29)
POTASSIUM SERPL-SCNC: 5.3 MMOL/L (ref 3.4–5.3)
SODIUM SERPL-SCNC: 137 MMOL/L (ref 135–145)

## 2024-12-30 PROCEDURE — 80048 BASIC METABOLIC PNL TOTAL CA: CPT

## 2024-12-30 PROCEDURE — 36415 COLL VENOUS BLD VENIPUNCTURE: CPT

## 2024-12-30 NOTE — TELEPHONE ENCOUNTER
Spoke w/ pt and updated him w/ lab results and recommendations from Apryl. Pt verbalized understanding.     JUSTIN Pink - pt reports tolerating spironolactone 12.5mg daily without any side effects thus far. Denies dizziness, lightheadedness. Feels well. He is sched to see Shameka this Friday 1/3. He will continue current medications and plan to use the OP lab at [facility] in 3-4 weeks for repeat BMP. Order placed. Encouraged to call HF nurse line if any questions, concerns, changes in the meantime. Pt agreeable.     PAPITO Ward RN

## 2024-12-30 NOTE — TELEPHONE ENCOUNTER
Noted.    PAPITO Ward RN     --------------------------------    Apryl Jon APRN CNP Strom, Tayva M, RN  Caller: Unspecified (Today, 12:29 PM)  Thank you

## 2024-12-30 NOTE — TELEPHONE ENCOUNTER
----- Message from Apryl Jon sent at 12/30/2024 10:55 AM CST -----  Stable lab since started on Spironolactone.  If tolerating, continue same dose.  BMP in 3-4 weeks.  Thank you!  CY

## 2025-01-03 ENCOUNTER — ORDERS ONLY (AUTO-RELEASED) (OUTPATIENT)
Dept: CARDIOLOGY | Facility: CLINIC | Age: OVER 89
End: 2025-01-03

## 2025-01-03 DIAGNOSIS — I47.29 NSVT (NONSUSTAINED VENTRICULAR TACHYCARDIA) (H): ICD-10-CM

## 2025-01-03 DIAGNOSIS — I50.23 ACUTE ON CHRONIC SYSTOLIC HEART FAILURE (H): ICD-10-CM

## 2025-01-22 ENCOUNTER — ANCILLARY PROCEDURE (OUTPATIENT)
Dept: CARDIOLOGY | Facility: CLINIC | Age: OVER 89
End: 2025-01-22
Attending: INTERNAL MEDICINE
Payer: MEDICARE

## 2025-01-22 DIAGNOSIS — I10 ESSENTIAL HYPERTENSION: ICD-10-CM

## 2025-01-22 DIAGNOSIS — I25.5 ISCHEMIC CARDIOMYOPATHY: ICD-10-CM

## 2025-01-22 DIAGNOSIS — Z95.810 ICD (IMPLANTABLE CARDIOVERTER-DEFIBRILLATOR) IN PLACE: ICD-10-CM

## 2025-01-22 DIAGNOSIS — I25.5 ISCHEMIC CARDIOMYOPATHY: Primary | ICD-10-CM

## 2025-01-22 DIAGNOSIS — I25.10 CORONARY ARTERY CALCIFICATION SEEN ON CT SCAN: ICD-10-CM

## 2025-01-22 LAB
CV ZIO PRELIM RESULTS: NORMAL
MDC_IDC_EPISODE_DTM: NORMAL
MDC_IDC_EPISODE_DURATION: 1 S
MDC_IDC_EPISODE_DURATION: 10 S
MDC_IDC_EPISODE_DURATION: 101 S
MDC_IDC_EPISODE_DURATION: 11 S
MDC_IDC_EPISODE_DURATION: 114 S
MDC_IDC_EPISODE_DURATION: 163 S
MDC_IDC_EPISODE_DURATION: 17 S
MDC_IDC_EPISODE_DURATION: 178 S
MDC_IDC_EPISODE_DURATION: 18 S
MDC_IDC_EPISODE_DURATION: 19 S
MDC_IDC_EPISODE_DURATION: 2 S
MDC_IDC_EPISODE_DURATION: 2 S
MDC_IDC_EPISODE_DURATION: 20 S
MDC_IDC_EPISODE_DURATION: 21 S
MDC_IDC_EPISODE_DURATION: 23 S
MDC_IDC_EPISODE_DURATION: 24 S
MDC_IDC_EPISODE_DURATION: 24 S
MDC_IDC_EPISODE_DURATION: 25 S
MDC_IDC_EPISODE_DURATION: 265 S
MDC_IDC_EPISODE_DURATION: 27 S
MDC_IDC_EPISODE_DURATION: 27 S
MDC_IDC_EPISODE_DURATION: 28 S
MDC_IDC_EPISODE_DURATION: 29 S
MDC_IDC_EPISODE_DURATION: 3 S
MDC_IDC_EPISODE_DURATION: 30 S
MDC_IDC_EPISODE_DURATION: 30 S
MDC_IDC_EPISODE_DURATION: 32 S
MDC_IDC_EPISODE_DURATION: 39 S
MDC_IDC_EPISODE_DURATION: 4 S
MDC_IDC_EPISODE_DURATION: 41 S
MDC_IDC_EPISODE_DURATION: 42 S
MDC_IDC_EPISODE_DURATION: 43 S
MDC_IDC_EPISODE_DURATION: 45 S
MDC_IDC_EPISODE_DURATION: 46 S
MDC_IDC_EPISODE_DURATION: 495 S
MDC_IDC_EPISODE_DURATION: 5 S
MDC_IDC_EPISODE_DURATION: 55 S
MDC_IDC_EPISODE_DURATION: 56 S
MDC_IDC_EPISODE_DURATION: 6 S
MDC_IDC_EPISODE_DURATION: 7 S
MDC_IDC_EPISODE_DURATION: 8 S
MDC_IDC_EPISODE_DURATION: 86 S
MDC_IDC_EPISODE_DURATION: 89 S
MDC_IDC_EPISODE_DURATION: 89 S
MDC_IDC_EPISODE_DURATION: 9 S
MDC_IDC_EPISODE_ID: NORMAL
MDC_IDC_EPISODE_TYPE: NORMAL
MDC_IDC_EPISODE_TYPE_INDUCED: NO
MDC_IDC_EPISODE_VENDOR_TYPE: NORMAL
MDC_IDC_LEAD_CONNECTION_STATUS: NORMAL
MDC_IDC_LEAD_CONNECTION_STATUS: NORMAL
MDC_IDC_LEAD_IMPLANT_DT: NORMAL
MDC_IDC_LEAD_IMPLANT_DT: NORMAL
MDC_IDC_LEAD_LOCATION: NORMAL
MDC_IDC_LEAD_LOCATION: NORMAL
MDC_IDC_LEAD_LOCATION_DETAIL_1: NORMAL
MDC_IDC_LEAD_LOCATION_DETAIL_1: NORMAL
MDC_IDC_LEAD_MFG: NORMAL
MDC_IDC_LEAD_MFG: NORMAL
MDC_IDC_LEAD_MODEL: NORMAL
MDC_IDC_LEAD_MODEL: NORMAL
MDC_IDC_LEAD_POLARITY_TYPE: NORMAL
MDC_IDC_LEAD_POLARITY_TYPE: NORMAL
MDC_IDC_LEAD_SERIAL: NORMAL
MDC_IDC_LEAD_SERIAL: NORMAL
MDC_IDC_MSMT_BATTERY_DTM: NORMAL
MDC_IDC_MSMT_BATTERY_REMAINING_LONGEVITY: 11 MO
MDC_IDC_MSMT_BATTERY_REMAINING_PERCENTAGE: 12 %
MDC_IDC_MSMT_BATTERY_STATUS: NORMAL
MDC_IDC_MSMT_CAP_CHARGE_DTM: NORMAL
MDC_IDC_MSMT_CAP_CHARGE_DTM: NORMAL
MDC_IDC_MSMT_CAP_CHARGE_ENERGY: 17 J
MDC_IDC_MSMT_CAP_CHARGE_TIME: 13.5 S
MDC_IDC_MSMT_CAP_CHARGE_TIME: 3.1 S
MDC_IDC_MSMT_CAP_CHARGE_TYPE: NORMAL
MDC_IDC_MSMT_CAP_CHARGE_TYPE: NORMAL
MDC_IDC_MSMT_LEADCHNL_RA_IMPEDANCE_VALUE: 543 OHM
MDC_IDC_MSMT_LEADCHNL_RV_IMPEDANCE_VALUE: 523 OHM
MDC_IDC_PG_IMPLANT_DTM: NORMAL
MDC_IDC_PG_MFG: NORMAL
MDC_IDC_PG_MODEL: NORMAL
MDC_IDC_PG_SERIAL: NORMAL
MDC_IDC_PG_TYPE: NORMAL
MDC_IDC_SESS_CLINIC_NAME: NORMAL
MDC_IDC_SESS_DTM: NORMAL
MDC_IDC_SESS_TYPE: NORMAL
MDC_IDC_SET_BRADY_AT_MODE_SWITCH_MODE: NORMAL
MDC_IDC_SET_BRADY_AT_MODE_SWITCH_RATE: 170 {BEATS}/MIN
MDC_IDC_SET_BRADY_LOWRATE: 60 {BEATS}/MIN
MDC_IDC_SET_BRADY_MAX_SENSOR_RATE: 130 {BEATS}/MIN
MDC_IDC_SET_BRADY_MAX_TRACKING_RATE: 130 {BEATS}/MIN
MDC_IDC_SET_BRADY_MODE: NORMAL
MDC_IDC_SET_BRADY_PAV_DELAY_HIGH: 180 MS
MDC_IDC_SET_BRADY_PAV_DELAY_LOW: 260 MS
MDC_IDC_SET_BRADY_SAV_DELAY_HIGH: 160 MS
MDC_IDC_SET_BRADY_SAV_DELAY_LOW: 230 MS
MDC_IDC_SET_LEADCHNL_RA_PACING_AMPLITUDE: 1.8 V
MDC_IDC_SET_LEADCHNL_RA_PACING_POLARITY: NORMAL
MDC_IDC_SET_LEADCHNL_RA_PACING_PULSEWIDTH: 0.6 MS
MDC_IDC_SET_LEADCHNL_RA_SENSING_ADAPTATION_MODE: NORMAL
MDC_IDC_SET_LEADCHNL_RA_SENSING_POLARITY: NORMAL
MDC_IDC_SET_LEADCHNL_RA_SENSING_SENSITIVITY: 0.8 MV
MDC_IDC_SET_LEADCHNL_RV_PACING_AMPLITUDE: 1.5 V
MDC_IDC_SET_LEADCHNL_RV_PACING_POLARITY: NORMAL
MDC_IDC_SET_LEADCHNL_RV_PACING_PULSEWIDTH: 0.4 MS
MDC_IDC_SET_LEADCHNL_RV_SENSING_ADAPTATION_MODE: NORMAL
MDC_IDC_SET_LEADCHNL_RV_SENSING_POLARITY: NORMAL
MDC_IDC_SET_LEADCHNL_RV_SENSING_SENSITIVITY: 0.6 MV
MDC_IDC_SET_ZONE_DETECTION_INTERVAL: 300 MS
MDC_IDC_SET_ZONE_DETECTION_INTERVAL: 429 MS
MDC_IDC_SET_ZONE_STATUS: NORMAL
MDC_IDC_SET_ZONE_STATUS: NORMAL
MDC_IDC_SET_ZONE_TYPE: NORMAL
MDC_IDC_SET_ZONE_TYPE: NORMAL
MDC_IDC_SET_ZONE_VENDOR_TYPE: NORMAL
MDC_IDC_SET_ZONE_VENDOR_TYPE: NORMAL
MDC_IDC_STAT_BRADY_DTM_END: NORMAL
MDC_IDC_STAT_BRADY_DTM_START: NORMAL
MDC_IDC_STAT_BRADY_RA_PERCENT_PACED: 61 %
MDC_IDC_STAT_BRADY_RV_PERCENT_PACED: 1 %
MDC_IDC_STAT_EPISODE_RECENT_COUNT: 0
MDC_IDC_STAT_EPISODE_RECENT_COUNT: 0
MDC_IDC_STAT_EPISODE_RECENT_COUNT: 153
MDC_IDC_STAT_EPISODE_RECENT_COUNT: 58
MDC_IDC_STAT_EPISODE_RECENT_COUNT: 8672
MDC_IDC_STAT_EPISODE_RECENT_COUNT_DTM_END: NORMAL
MDC_IDC_STAT_EPISODE_RECENT_COUNT_DTM_START: NORMAL
MDC_IDC_STAT_EPISODE_TYPE: NORMAL
MDC_IDC_STAT_EPISODE_VENDOR_TYPE: NORMAL
MDC_IDC_STAT_TACHYTHERAPY_ATP_DELIVERED_RECENT: 0
MDC_IDC_STAT_TACHYTHERAPY_ATP_DELIVERED_TOTAL: 0
MDC_IDC_STAT_TACHYTHERAPY_RECENT_DTM_END: NORMAL
MDC_IDC_STAT_TACHYTHERAPY_RECENT_DTM_START: NORMAL
MDC_IDC_STAT_TACHYTHERAPY_SHOCKS_ABORTED_RECENT: 0
MDC_IDC_STAT_TACHYTHERAPY_SHOCKS_ABORTED_TOTAL: 0
MDC_IDC_STAT_TACHYTHERAPY_SHOCKS_DELIVERED_RECENT: 0
MDC_IDC_STAT_TACHYTHERAPY_SHOCKS_DELIVERED_TOTAL: 1
MDC_IDC_STAT_TACHYTHERAPY_TOTAL_DTM_END: NORMAL

## 2025-01-22 PROCEDURE — 93295 DEV INTERROG REMOTE 1/2/MLT: CPT | Performed by: INTERNAL MEDICINE

## 2025-01-22 PROCEDURE — 93296 REM INTERROG EVL PM/IDS: CPT | Performed by: INTERNAL MEDICINE

## 2025-01-23 RX ORDER — MAGNESIUM OXIDE 400 MG/1
400 TABLET ORAL DAILY
Qty: 90 TABLET | Refills: 0 | Status: SHIPPED | OUTPATIENT
Start: 2025-01-23

## 2025-02-03 ENCOUNTER — OFFICE VISIT (OUTPATIENT)
Dept: CARDIOLOGY | Facility: CLINIC | Age: OVER 89
End: 2025-02-03
Payer: MEDICARE

## 2025-02-03 VITALS
HEIGHT: 66 IN | RESPIRATION RATE: 16 BRPM | SYSTOLIC BLOOD PRESSURE: 98 MMHG | DIASTOLIC BLOOD PRESSURE: 44 MMHG | WEIGHT: 174.6 LBS | HEART RATE: 64 BPM | BODY MASS INDEX: 28.06 KG/M2

## 2025-02-03 DIAGNOSIS — Z95.810 ICD (IMPLANTABLE CARDIOVERTER-DEFIBRILLATOR) IN PLACE: Primary | Chronic | ICD-10-CM

## 2025-02-03 PROCEDURE — 99205 OFFICE O/P NEW HI 60 MIN: CPT | Performed by: INTERNAL MEDICINE

## 2025-02-03 PROCEDURE — G2211 COMPLEX E/M VISIT ADD ON: HCPCS | Performed by: INTERNAL MEDICINE

## 2025-02-03 NOTE — PROGRESS NOTES
HEART CARE ENCOUNTER CONSULTATON NOTE      [facility]  [phone]      Assessment/Recommendations   Assessment/Plan:    Anthony Tiwari is a very pleasant 89 year old male with PMH of Cardiomyopathy s/p dual chamber ICD, coronary calcification, T2DM, CKD, SIRIA who presents today to the EP clinic.    NSVT  - clearly worsened by HF exacerbation and electrolyte abnormalities  - strongly counseled on GDMT compliance  - salt and fluid restriction discussed    2. Dual chamber ICD  - NSVT as noted above  - continue remote checks      Time spent: 60 minutes spent on the date of the encounter doing chart review, history and exam, documentation and further activities as noted above.    The longitudinal plan of care for the condition(s) below were addressed during this visit. Due to the added complexity in care, I will continue support in the subsequent management of this condition(s) and with the ongoing continuity of care of this condition(s).        History of Present Illness/Subjective    HPI: Anthony Tiwari is a very pleasant 89 year old male with PMH of Cardiomyopathy s/p dual chamber ICD, coronary calcification, T2DM, CKD, SIRIA who presents today to the EP clinic.    Anthony was admitted to the hospital with exacerbation of heart failure in October 2024. He appeared to have had a lot of episodes of NSVT. Some episodes were noted in December. He appears to be asymptomatic during these episodes, or at least it is difficult to clearly delineate his symptoms.     He watches his grand kids on most days to keep busy.    Does not smoke or drink.    Recent Echocardiogram Results (personally reviewed):    Interpretation Summary     Left ventricular function is decreased. The ejection fraction is 25-30%  (severely reduced).  Normal right ventricle size and systolic function.  The left atrium is moderate to severely dilated.  The right atrium is mild to moderately dilated.  There is mild (1+) aortic  "regurgitation.  IVC diameter <2.1 cm collapsing >50% with sniff suggests a normal RA pressure  of 3 mmHg.  The patient exhibited frequent PVCs.    Recent Stress Test Results (personally reviewed):      The nuclear stress test is negative for inducible myocardial ischemia or infarction.    There is a small sized perfusion defect involving the inferior wall which improves on stress images and likely represents diaphragmatic attenuation artifact.    The stress electrocardiogram is negative for inducible ischemic EKG changes.    The left ventricular ejection fraction at stress is 37%.    The patient is at a low risk of future cardiac ischemic events.    A prior study was conducted on 11/8/2016.  This study has no change when compared with the prior study.    Labs below reviewed personally     Physical Examination  Review of Systems   Vitals: BP 98/44 (BP Location: Right arm, Patient Position: Sitting, Cuff Size: Adult Regular)   Pulse 64   Resp 16   Ht 1.676 m (5' 6\")   Wt 79.2 kg (174 lb 9.6 oz)   BMI 28.18 kg/m    BMI= Body mass index is 28.18 kg/m .  Wt Readings from Last 3 Encounters:   02/03/25 79.2 kg (174 lb 9.6 oz)   01/03/25 74.8 kg (165 lb)   11/13/24 74.6 kg (164 lb 8 oz)       General Appearance:   no distress, normal body habitus   ENT/Mouth: membranes moist, no oral lesions or bleeding gums.      EYES:  no scleral icterus, normal conjunctivae   Neck: no carotid bruits or thyromegaly   Chest/Lungs:   lungs are clear to auscultation, no rales or wheezing, no sternal scar, equal chest wall expansion    Cardiovascular:   Regular. Normal first and second heart sounds with no murmurs, rubs, or gallops; the carotid, radial and posterior tibial pulses are intact, no edema bilaterally    Abdomen:  no organomegaly, masses, bruits, or tenderness; bowel sounds are present   Extremities: no cyanosis or clubbing   Skin: no xanthelasma, warm.    Neurologic: normal  bilateral, no tremors     Psychiatric: alert " and oriented x3, calm        Please refer above for cardiac ROS details.        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 Cody at [facility], getting chemo again in 2017    Nonocclusive coronary atherosclerosis of native coronary artery     nonobstuctive by cor angio      SIRIA on CPAP 2013    he sees someone at [facility]    Other primary cardiomyopathies 2013    EF was as low as 23%    Statin intolerance 02/21/2019    history of elevated CPK on both simvastatin but less so on atorvastatin.    Type 2 diabetes mellitus (H)      Past Surgical History:   Procedure Laterality Date    CARDIAC DEFIBRILLATOR PLACEMENT  07/24/2013    Dr. Rodriguez    CATARACT EXTRACTION W/ INTRAOCULAR LENS  IMPLANT, BILATERAL  05/2018    CYSTOSCOPY W/ LITHOLAPAXY / EHL N/A 03/19/2019    Procedure: CYSTOLITHOLAPAXY;  Surgeon: Yunior Moore MD;  Location: [facility] OR;  Service: Urology    HERNIA REPAIR Left     MD CYSTO/URETERO W/LITHOTRIPSY &INDWELL STENT INSRT Right 03/19/2019    Procedure: CYSTOSCOPY RIGHT URETEROSCOPY LASER LITHOTRIPSY;  Surgeon: Yunior Moore MD;  Location: [facility] OR;  Service: Urology    TOTAL HIP ARTHROPLASTY Right 2012    URETEROSCOPY       Family History   Problem Relation Age of Onset    Rheumatic Heart Disease Brother         half brother    Dementia Brother     Cerebrovascular Disease Father     Rheumatic Heart Disease Sister     Sudden Death Sister 67.00        no autopsy    CABG Sister 81.00    No Known Problems Daughter     No Known  Problems Daughter     No Known Problems Daughter     No Known Problems Granddaughter     No Known Problems Grandson     Urolithiasis No family hx of     Clotting Disorder No family hx of     Gout No family hx of         Social History     Socioeconomic History    Marital status:      Spouse name: Not on file    Number of children: 3    Years of education: Not on file    Highest education level: Not on file   Occupational History    Not on file   Tobacco Use    Smoking status: Never    Smokeless tobacco: Never   Vaping Use    Vaping status: Never Used   Substance and Sexual Activity    Alcohol use: No    Drug use: No    Sexual activity: Not on file   Other Topics Concern    Not on file   Social History Narrative    He used to walk for an hour most days and still takes care of his lawn and snow removal, though his grandsons are helping more with the lawn in 2018. 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 He is walking less in 2019. His wife,  Rebeka, is 6 years his tu and is providing daytime and overnight  for their daughter Loree's fraternal twins, Estrella and Jovanny, since 2019. Anthony spends 5 hours a day with Loree and the twins.     Social Drivers of Health     Financial Resource Strain: Low Risk  (10/23/2024)    Financial Resource Strain     Within the past 12 months, have you or your family members you live with been unable to get utilities (heat, electricity) when it was really needed?: No   Food Insecurity: Low Risk  (10/23/2024)    Food Insecurity     Within the past 12 months, did you worry that your food would run out before you got money to buy more?: No     Within the past 12 months, did the food you bought just not last and you didn t have money to get more?: No   Transportation Needs: Low Risk  (10/23/2024)    Transportation Needs     Within the past 12 months, has lack of transportation kept you from medical appointments, getting your medicines,  non-medical meetings or appointments, work, or from getting things that you need?: No   Physical Activity: Not on file   Stress: Not on file   Social Connections: Unknown (1/1/2022)    Received from [facility], [facility]    Social Connections     Frequency of Communication with Friends and Family: Not on file   Interpersonal Safety: Low Risk  (10/23/2024)    Interpersonal Safety     Do you feel physically and emotionally safe where you currently live?: Yes     Within the past 12 months, have you been hit, slapped, kicked or otherwise physically hurt by someone?: No     Within the past 12 months, have you been humiliated or emotionally abused in other ways by your partner or ex-partner?: No   Housing Stability: Low Risk  (10/23/2024)    Housing Stability     Do you have housing? : Yes     Are you worried about losing your housing?: No           Medications  Allergies   Current Outpatient Medications   Medication Sig Dispense Refill    aspirin 81 MG EC tablet [ASPIRIN 81 MG EC TABLET] Take 81 mg by mouth bedtime.      atorvastatin (LIPITOR) 10 MG tablet Take 10 mg by mouth at bedtime.      carbidopa-levodopa (SINEMET)  MG tablet Take 2 tablets by mouth every morning. (Also taking 1 tablet midday and 1 tablet at bedtime)      CONTOUR NEXT TEST test strip USE TO TEST BLOOD SUGAR DAILY      furosemide (LASIX) 20 MG tablet Take 0.5 tablets (10 mg) by mouth daily. 45 tablet 2    LANTUS VIAL 100 UNIT/ML soln Inject 10 Units subcutaneously at bedtime.      lisinopril (ZESTRIL) 2.5 MG tablet Take 1 tablet (2.5 mg) by mouth daily. 90 tablet 2    magnesium oxide 400 MG tablet Take 1 tablet (400 mg) by mouth daily. 90 tablet 0    metFORMIN (GLUCOPHAGE) 500 MG tablet Take 500 mg by mouth 2 times daily (with meals).      metoprolol succinate ER (TOPROL XL) 100 MG 24 hr tablet TAKE 1 TABLET AT BEDTIME 90 tablet 0    spironolactone (ALDACTONE) 25 MG tablet  "Take 0.5 tablets (12.5 mg) by mouth daily. 90 tablet 1    UNABLE TO FIND Spray 1 Application in nostril at bedtime. MEDICATION NAME: CPAP         Allergies   Allergen Reactions    Simvastatin Other (See Comments)     Elevated CPK          Lab Results    Chemistry/lipid CBC Cardiac Enzymes/BNP/TSH/INR   Recent Labs   Lab Test 06/04/24  1129   CHOL 109   HDL 28*   LDL 9   TRIG 359*     Recent Labs   Lab Test 06/04/24  1129 01/26/23  0948 02/03/22  1003   LDL 9 36 49     Recent Labs   Lab Test 12/30/24  0937      POTASSIUM 5.3   CHLORIDE 105   CO2 20*   *   BUN 34.8*   CR 1.15   GFRESTIMATED 61   SRINI 9.5     Recent Labs   Lab Test 12/30/24  0937 12/20/24  0957 12/12/24  1134   CR 1.15 1.19* 1.18*     No results for input(s): \"A1C\" in the last 30108 hours.       Recent Labs   Lab Test 10/22/24  1734   WBC 8.2   HGB 12.3*   HCT 37.7*   MCV 91        Recent Labs   Lab Test 10/22/24  1734 09/29/24  0445 09/28/24  1449   HGB 12.3* 10.3* 11.9*    Recent Labs   Lab Test 04/10/21  0835   TROPONINI 0.02     Recent Labs   Lab Test 10/22/24  1944 06/14/21  1028 04/10/21  0835 12/15/20  1500   BNP  --  118* 226* 224*   NTBNPI 7,532*  --   --   --      Recent Labs   Lab Test 01/26/23  0948   TSH 0.81     No results for input(s): \"INR\" in the last 91980 hours.     Christiane Cuenca MD                                      "

## 2025-02-03 NOTE — PATIENT INSTRUCTIONS
[facility]  Cardiac Electrophysiology  [address]  [place] [address]   Office: [phone]  Fax: [phone]       Thank you for seeing us in clinic today - it is a pleasure to be a part of your care team.  Below is a summary of our plan from today's visit.      Please take your meds as prescribed  Watch your salt intake; less 2gm daily  Follow up in one year    Please do not hesitate to be in touch with our office at [phone] with any questions that may arise.      Thank you for trusting us with your care,    Christiane Cuenca MD  Clinical Cardiac Electrophysiology  [facility]  [address]  [place] [address]   Office: [phone]  Fax: [phone]

## 2025-02-03 NOTE — LETTER
2/3/2025    Alejandro Meredith MD  [facility] [address] [place] [address]    RE: Anthony Tiwari       Dear Colleague,     I had the pleasure of seeing Anthony Tiwari in the [facility].    HEART CARE ENCOUNTER CONSULTATON NOTE      [facility]  [phone]      Assessment/Recommendations   Assessment/Plan:    Anthony Tiwari is a very pleasant 89 year old male with PMH of Cardiomyopathy s/p dual chamber ICD, coronary calcification, T2DM, CKD, SIRIA who presents today to the EP clinic.    NSVT  - clearly worsened by HF exacerbation and electrolyte abnormalities  - strongly counseled on GDMT compliance  - salt and fluid restriction discussed    2. Dual chamber ICD  - NSVT as noted above  - continue remote checks      Time spent: 60 minutes spent on the date of the encounter doing chart review, history and exam, documentation and further activities as noted above.    The longitudinal plan of care for the condition(s) below were addressed during this visit. Due to the added complexity in care, I will continue support in the subsequent management of this condition(s) and with the ongoing continuity of care of this condition(s).        History of Present Illness/Subjective    HPI: Anthony Tiwari is a very pleasant 89 year old male with PMH of Cardiomyopathy s/p dual chamber ICD, coronary calcification, T2DM, CKD, SIRIA who presents today to the EP clinic.    Anthony was admitted to the hospital with exacerbation of heart failure in October 2024. He appeared to have had a lot of episodes of NSVT. Some episodes were noted in December. He appears to be asymptomatic during these episodes, or at least it is difficult to clearly delineate his symptoms.     He watches his grand kids on most days to keep busy.    Does not smoke or drink.    Recent Echocardiogram Results (personally reviewed):    Interpretation Summary     Left ventricular function is decreased. The  "ejection fraction is 25-30%  (severely reduced).  Normal right ventricle size and systolic function.  The left atrium is moderate to severely dilated.  The right atrium is mild to moderately dilated.  There is mild (1+) aortic regurgitation.  IVC diameter <2.1 cm collapsing >50% with sniff suggests a normal RA pressure  of 3 mmHg.  The patient exhibited frequent PVCs.    Recent Stress Test Results (personally reviewed):       The nuclear stress test is negative for inducible myocardial ischemia or infarction.     There is a small sized perfusion defect involving the inferior wall which improves on stress images and likely represents diaphragmatic attenuation artifact.     The stress electrocardiogram is negative for inducible ischemic EKG changes.     The left ventricular ejection fraction at stress is 37%.     The patient is at a low risk of future cardiac ischemic events.     A prior study was conducted on 11/8/2016.  This study has no change when compared with the prior study.    Labs below reviewed personally     Physical Examination  Review of Systems   Vitals: BP 98/44 (BP Location: Right arm, Patient Position: Sitting, Cuff Size: Adult Regular)   Pulse 64   Resp 16   Ht 1.676 m (5' 6\")   Wt 79.2 kg (174 lb 9.6 oz)   BMI 28.18 kg/m    BMI= Body mass index is 28.18 kg/m .  Wt Readings from Last 3 Encounters:   02/03/25 79.2 kg (174 lb 9.6 oz)   01/03/25 74.8 kg (165 lb)   11/13/24 74.6 kg (164 lb 8 oz)       General Appearance:   no distress, normal body habitus   ENT/Mouth: membranes moist, no oral lesions or bleeding gums.      EYES:  no scleral icterus, normal conjunctivae   Neck: no carotid bruits or thyromegaly   Chest/Lungs:   lungs are clear to auscultation, no rales or wheezing, no sternal scar, equal chest wall expansion    Cardiovascular:   Regular. Normal first and second heart sounds with no murmurs, rubs, or gallops; the carotid, radial and posterior tibial pulses are intact, no edema " bilaterally    Abdomen:  no organomegaly, masses, bruits, or tenderness; bowel sounds are present   Extremities: no cyanosis or clubbing   Skin: no xanthelasma, warm.    Neurologic: normal  bilateral, no tremors     Psychiatric: alert and oriented x3, calm        Please refer above for cardiac ROS details.        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 Cody at [facility], getting chemo again in 2017     Nonocclusive coronary atherosclerosis of native coronary artery     nonobstuctive by cor angio       SIRIA on CPAP 2013    he sees someone at [facility]     Other primary cardiomyopathies 2013    EF was as low as 23%     Statin intolerance 02/21/2019    history of elevated CPK on both simvastatin but less so on atorvastatin.     Type 2 diabetes mellitus (H)      Past Surgical History:   Procedure Laterality Date     CARDIAC DEFIBRILLATOR PLACEMENT  07/24/2013    Dr. Rodriguez     CATARACT EXTRACTION W/ INTRAOCULAR LENS  IMPLANT, BILATERAL  05/2018     CYSTOSCOPY W/ LITHOLAPAXY / EHL N/A 03/19/2019    Procedure: CYSTOLITHOLAPAXY;  Surgeon: Yunior Moore MD;  Location: [facility];  Service: Urology     HERNIA REPAIR Left      IN CYSTO/URETERO W/LITHOTRIPSY &INDWELL STENT INSRT Right 03/19/2019    Procedure: CYSTOSCOPY RIGHT URETEROSCOPY LASER LITHOTRIPSY;  Surgeon: Yunior Moore MD;  Location: [facility];  Service: Urology     TOTAL HIP ARTHROPLASTY Right 2012     URETEROSCOPY       Family History   Problem Relation Age of Onset      Rheumatic Heart Disease Brother         half brother     Dementia Brother      Cerebrovascular Disease Father      Rheumatic Heart Disease Sister      Sudden Death Sister 67.00        no autopsy     CABG Sister 81.00     No Known Problems Daughter      No Known Problems Daughter      No Known Problems Daughter      No Known Problems Granddaughter      No Known Problems Grandson      Urolithiasis No family hx of      Clotting Disorder No family hx of      Gout No family hx of         Social History     Socioeconomic History     Marital status:      Spouse name: Not on file     Number of children: 3     Years of education: Not on file     Highest education level: Not on file   Occupational History     Not on file   Tobacco Use     Smoking status: Never     Smokeless tobacco: Never   Vaping Use     Vaping status: Never Used   Substance and Sexual Activity     Alcohol use: No     Drug use: No     Sexual activity: Not on file   Other Topics Concern     Not on file   Social History Narrative    He used to walk for an hour most days and still takes care of his lawn and snow removal, though his grandsons are helping more with the lawn in 2018. 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 He is walking less in 2019. His wife,  Rebeka, is 6 years his tu and is providing daytime and overnight  for their daughter Loree's fraternal twins, Estrella and Jovanny, since 2019. Anthony spends 5 hours a day with Loree and the twins.     Social Drivers of Health     Financial Resource Strain: Low Risk  (10/23/2024)    Financial Resource Strain      Within the past 12 months, have you or your family members you live with been unable to get utilities (heat, electricity) when it was really needed?: No   Food Insecurity: Low Risk  (10/23/2024)    Food Insecurity      Within the past 12 months, did you worry that your food would run out before you got money to buy more?: No      Within the  past 12 months, did the food you bought just not last and you didn t have money to get more?: No   Transportation Needs: Low Risk  (10/23/2024)    Transportation Needs      Within the past 12 months, has lack of transportation kept you from medical appointments, getting your medicines, non-medical meetings or appointments, work, or from getting things that you need?: No   Physical Activity: Not on file   Stress: Not on file   Social Connections: Unknown (1/1/2022)    Received from [facility], [facility]    Social Connections      Frequency of Communication with Friends and Family: Not on file   Interpersonal Safety: Low Risk  (10/23/2024)    Interpersonal Safety      Do you feel physically and emotionally safe where you currently live?: Yes      Within the past 12 months, have you been hit, slapped, kicked or otherwise physically hurt by someone?: No      Within the past 12 months, have you been humiliated or emotionally abused in other ways by your partner or ex-partner?: No   Housing Stability: Low Risk  (10/23/2024)    Housing Stability      Do you have housing? : Yes      Are you worried about losing your housing?: No           Medications  Allergies   Current Outpatient Medications   Medication Sig Dispense Refill     aspirin 81 MG EC tablet [ASPIRIN 81 MG EC TABLET] Take 81 mg by mouth bedtime.       atorvastatin (LIPITOR) 10 MG tablet Take 10 mg by mouth at bedtime.       carbidopa-levodopa (SINEMET)  MG tablet Take 2 tablets by mouth every morning. (Also taking 1 tablet midday and 1 tablet at bedtime)       CONTOUR NEXT TEST test strip USE TO TEST BLOOD SUGAR DAILY       furosemide (LASIX) 20 MG tablet Take 0.5 tablets (10 mg) by mouth daily. 45 tablet 2     LANTUS VIAL 100 UNIT/ML soln Inject 10 Units subcutaneously at bedtime.       lisinopril (ZESTRIL) 2.5 MG tablet Take 1 tablet (2.5 mg) by mouth daily. 90 tablet 2     magnesium  "oxide 400 MG tablet Take 1 tablet (400 mg) by mouth daily. 90 tablet 0     metFORMIN (GLUCOPHAGE) 500 MG tablet Take 500 mg by mouth 2 times daily (with meals).       metoprolol succinate ER (TOPROL XL) 100 MG 24 hr tablet TAKE 1 TABLET AT BEDTIME 90 tablet 0     spironolactone (ALDACTONE) 25 MG tablet Take 0.5 tablets (12.5 mg) by mouth daily. 90 tablet 1     UNABLE TO FIND Spray 1 Application in nostril at bedtime. MEDICATION NAME: CPAP         Allergies   Allergen Reactions     Simvastatin Other (See Comments)     Elevated CPK          Lab Results    Chemistry/lipid CBC Cardiac Enzymes/BNP/TSH/INR   Recent Labs   Lab Test 06/04/24  1129   CHOL 109   HDL 28*   LDL 9   TRIG 359*     Recent Labs   Lab Test 06/04/24  1129 01/26/23  0948 02/03/22  1003   LDL 9 36 49     Recent Labs   Lab Test 12/30/24  0937      POTASSIUM 5.3   CHLORIDE 105   CO2 20*   *   BUN 34.8*   CR 1.15   GFRESTIMATED 61   SRINI 9.5     Recent Labs   Lab Test 12/30/24  0937 12/20/24  0957 12/12/24  1134   CR 1.15 1.19* 1.18*     No results for input(s): \"A1C\" in the last 71676 hours.       Recent Labs   Lab Test 10/22/24  1734   WBC 8.2   HGB 12.3*   HCT 37.7*   MCV 91        Recent Labs   Lab Test 10/22/24  1734 09/29/24  0445 09/28/24  1449   HGB 12.3* 10.3* 11.9*    Recent Labs   Lab Test 04/10/21  0835   TROPONINI 0.02     Recent Labs   Lab Test 10/22/24  1944 06/14/21  1028 04/10/21  0835 12/15/20  1500   BNP  --  118* 226* 224*   NTBNPI 7,532*  --   --   --      Recent Labs   Lab Test 01/26/23  0948   TSH 0.81     No results for input(s): \"INR\" in the last 17963 hours.     Christiane Cuenca MD                                        Thank you for allowing me to participate in the care of your patient.      Sincerely,     Christiane Cuenca MD     [facility]  cc:   No referring provider defined for this encounter.      "

## 2025-03-03 DIAGNOSIS — I42.8 NICM (NONISCHEMIC CARDIOMYOPATHY) (H): ICD-10-CM

## 2025-03-03 RX ORDER — METOPROLOL SUCCINATE 100 MG/1
100 TABLET, EXTENDED RELEASE ORAL AT BEDTIME
Qty: 90 TABLET | Refills: 2 | Status: SHIPPED | OUTPATIENT
Start: 2025-03-03

## 2025-03-10 NOTE — PROGRESS NOTES
"        Assessment/Recommendations   Assessment:    #   History of idiopathic cardiomyopathy with status post ICD, Acute on chronic systolic heart failure, NYHA Class NYHA Class II:Echocardiogram in October 2024 showed LVEF reduced to 25 to 30%.  Previous echo showed LVEF of 35 to 40% per echo in January 2022.  Current home weight is 171-173 lbs.  Weight is up approximately 8 pounds since hospitalization in October.    Patient is mild hypervolemic on exam.    Heart failure regimen includes:    -Beta-blocker therapy with metoprolol succinate 100 mg daily    -ACEI  therapy with lisinopril 2.5 mg daily    -Aldosterone blocker/MRA therapy with spironolactone 12.5 mg daily    -Not on SGLT2 Inhibitor    -Diuretic therapy with furosemide 10 mg daily    # NSVT: Device check showed NSVT burden less than 1%.  Patient had a follow-up with Dr. Cuenca.     #  Hypertension: Blood pressure stable.    # Chronic kidney disease stage IIIa: BMP stable on 12/30/2024 with potassium 5.3 and creatinine 1.15.    # Obstructive sleep apnea: On CPAP regularly.    # Moderate coronary artery calcification per chest CT on 11/11/2024: Previously noted mild calcification.  No chest pain.  Lexiscan stress test negative for inducible myocardial ischemia in December 2024.    Plan/Recommendation:  -BMP and NT proBNP pending.  -Discussed with PCP regarding starting on Jardiance.  -If unable to start Jardiance, consider increasing ACE inhibitor if renal function and potassium allows or increased beta-blocker therapy    Follow up with Dr. Reyez in July 2025. Follow up with me in 4 to 6 weeks.     The longitudinal plan of care for acute on chronic systolic heart failure was addressed during this visit.?Due to the added   complexity in care, I will continue to support Anthony Tiwari in the subsequent management of this   condition(s) and with the ongoing continuity of care of this condition(s)\".     History of Present Illness/Subjective    Mr. Cruz " "KEVIN Tiwari is a 89 year old male with a past medical history of nonischemic cardiomyopathy status post ICD, NSVT, recent hospitalization with pneumonia in September, and rehospitalization in October with acute decompensated heart failure and acute on chronic kidney disease who is seen at [facility] for continued heart failure follow-up.    Today, Wolf is accompanied by his spouse and his daughter.  He is tolerating spironolactone with no adverse effects.  He reports a few pounds weight gain.  However he denies worsening heart failure symptoms.  He denies fatigue, lightheadedness, shortness of breath, dyspnea on exertion, orthopnea, PND, palpitations, chest pain, abdominal fullness/bloating, and lower extremity edema.  He reports compliant with taking his diuretic therapy regularly.      ECHO from 10/2024-Reviewed:   Interpretation Summary   Lft ventricular function is decreased. The ejection fraction is 25-30%  (severely reduced).  Normal right ventricle size and systolic function.  The left atrium is moderate to severely dilated.  The right atrium is mild to moderately dilated.  There is mild (1+) aortic regurgitation.  IVC diameter <2.1 cm collapsing >50% with sniff suggests a normal RA pressure  of 3 mmHg.  The patient exhibited frequent PVCs.     Physical Examination Review of Systems   /66 (BP Location: Left arm, Patient Position: Sitting, Cuff Size: Adult Regular)   Pulse 60   Resp 16   Ht 1.702 m (5' 7\")   Wt 79.4 kg (175 lb)   BMI 27.41 kg/m    Body mass index is 27.41 kg/m .  Wt Readings from Last 3 Encounters:   03/11/25 79.4 kg (175 lb)   02/03/25 79.2 kg (174 lb 9.6 oz)   01/03/25 74.8 kg (165 lb)     General Appearance:   no distress, normal body habitus   ENT/Mouth: membranes moist, no oral lesions or bleeding gums.      EYES:  no scleral icterus, normal conjunctivae   Neck: no carotid bruits or thyromegaly   Chest/Lungs:   lungs are clear to auscultation, " "no rales or wheezing, equal chest wall expansion   Cardiovascular:    Normal first and second heart sounds with no murmurs, rubs, or gallops; Jugular venous pressure flat; trace edema bilaterally    Abdomen:  no organomegaly, masses, bruits, or tenderness; bowel sounds are present   Extremities   no cyanosis or clubbing    CMS intact.   Skin: no xanthelasma, warm.    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 Cody at [facility], getting chemo again in 2017    Nonocclusive coronary atherosclerosis of native coronary artery     nonobstuctive by cor angio      SIRIA on CPAP 2013    he sees someone at [facility]    Other primary cardiomyopathies 2013    EF was as low as 23%    Statin intolerance 02/21/2019    history of elevated CPK on both simvastatin but less so on atorvastatin.    Type 2 diabetes mellitus (H)     Past Surgical History:   Procedure Laterality Date    CARDIAC DEFIBRILLATOR PLACEMENT  07/24/2013    Dr. Rodriguez    CATARACT EXTRACTION W/ INTRAOCULAR LENS  IMPLANT, BILATERAL  05/2018    CYSTOSCOPY W/ LITHOLAPAXY / EHL N/A 03/19/2019    Procedure: CYSTOLITHOLAPAXY;  Surgeon: Yunior Moore MD;  Location: [facility];  Service: Urology    " HERNIA REPAIR Left     CA CYSTO/URETERO W/LITHOTRIPSY &INDWELL STENT INSRT Right 03/19/2019    Procedure: CYSTOSCOPY RIGHT URETEROSCOPY LASER LITHOTRIPSY;  Surgeon: Yunior Moore MD;  Location: [facility] OR;  Service: Urology    TOTAL HIP ARTHROPLASTY Right 2012    URETEROSCOPY      Family History   Problem Relation Age of Onset    Rheumatic Heart Disease Brother         half brother    Dementia Brother     Cerebrovascular Disease Father     Rheumatic Heart Disease Sister     Sudden Death Sister 67.00        no autopsy    CABG Sister 81.00    No Known Problems Daughter     No Known Problems Daughter     No Known Problems Daughter     No Known Problems Granddaughter     No Known Problems Grandson     Urolithiasis No family hx of     Clotting Disorder No family hx of     Gout No family hx of     Social History     Socioeconomic History    Marital status:      Spouse name: Not on file    Number of children: 3    Years of education: Not on file    Highest education level: Not on file   Occupational History    Not on file   Tobacco Use    Smoking status: Never    Smokeless tobacco: Never   Vaping Use    Vaping status: Never Used   Substance and Sexual Activity    Alcohol use: No    Drug use: No    Sexual activity: Not on file   Other Topics Concern    Not on file   Social History Narrative    He used to walk for an hour most days and still takes care of his lawn and snow removal, though his grandsons are helping more with the lawn in 2018. He volunteers at the [employer] since 2000. He is walking less in 2019. His wife,  Rebeka, is 6 years his tu and is providing daytime and overnight  for their daughter Loree's fraternal twins, Estrella and Jovanny, since 2019. Anthony spends 5 hours a day with Loree and the twins.     Social Drivers of Health     Financial Resource Strain: Low Risk  (10/23/2024)    Financial Resource Strain     Within the past 12 months, have  you or your family members you live with been unable to get utilities (heat, electricity) when it was really needed?: No   Food Insecurity: Low Risk  (10/23/2024)    Food Insecurity     Within the past 12 months, did you worry that your food would run out before you got money to buy more?: No     Within the past 12 months, did the food you bought just not last and you didn t have money to get more?: No   Transportation Needs: Low Risk  (10/23/2024)    Transportation Needs     Within the past 12 months, has lack of transportation kept you from medical appointments, getting your medicines, non-medical meetings or appointments, work, or from getting things that you need?: No   Physical Activity: Not on file   Stress: Not on file   Social Connections: Unknown (1/1/2022)    [facility] from [facility]    Social Connections     Frequency of Communication with Friends and Family: Not on file   Interpersonal Safety: Low Risk  (10/23/2024)    Interpersonal Safety     Do you feel physically and emotionally safe where you currently live?: Yes     Within the past 12 months, have you been hit, slapped, kicked or otherwise physically hurt by someone?: No     Within the past 12 months, have you been humiliated or emotionally abused in other ways by your partner or ex-partner?: No   Housing Stability: Low Risk  (10/23/2024)    Housing Stability     Do you have housing? : Yes     Are you worried about losing your housing?: No          Medications  Allergies   Current Outpatient Medications   Medication Sig Dispense Refill    aspirin 81 MG EC tablet [ASPIRIN 81 MG EC TABLET] Take 81 mg by mouth bedtime.      atorvastatin (LIPITOR) 10 MG tablet Take 10 mg by mouth at bedtime.      carbidopa-levodopa (SINEMET)  MG tablet Take 2 tablets by mouth every morning. (Also taking 1 tablet midday and 1 tablet at bedtime)      furosemide (LASIX) 20 MG tablet Take  0.5 tablets (10 mg) by mouth daily. 45 tablet 2    LANTUS VIAL 100 UNIT/ML soln Inject 10 Units subcutaneously at bedtime.      lisinopril (ZESTRIL) 2.5 MG tablet Take 1 tablet (2.5 mg) by mouth daily. 90 tablet 2    magnesium oxide 400 MG tablet Take 1 tablet (400 mg) by mouth daily. 90 tablet 0    metFORMIN (GLUCOPHAGE) 500 MG tablet Take 500 mg by mouth 2 times daily (with meals). 2 pills in the am and 2 pills in the pm      metoprolol succinate ER (TOPROL XL) 100 MG 24 hr tablet Take 1 tablet (100 mg) by mouth at bedtime. 90 tablet 2    spironolactone (ALDACTONE) 25 MG tablet Take 0.5 tablets (12.5 mg) by mouth daily. 90 tablet 1    UNABLE TO FIND Spray 1 Application in nostril at bedtime. MEDICATION NAME: CPAP      CONTOUR NEXT TEST test strip USE TO TEST BLOOD SUGAR DAILY (Patient not taking: Reported on 3/11/2025)      Allergies   Allergen Reactions    Simvastatin Other (See Comments)     Elevated CPK         Lab Results    Chemistry/lipid CBC Cardiac Enzymes/BNP/TSH/INR   Lab Results   Component Value Date    CHOL 109 06/04/2024    HDL 28 (L) 06/04/2024    TRIG 359 (H) 06/04/2024    BUN 34.8 (H) 12/30/2024     12/30/2024    CO2 20 (L) 12/30/2024    Lab Results   Component Value Date    WBC 8.2 10/22/2024    HGB 12.3 (L) 10/22/2024    HCT 37.7 (L) 10/22/2024    MCV 91 10/22/2024     10/22/2024    Lab Results   Component Value Date    TROPONINI 0.02 04/10/2021     (H) 06/14/2021    TSH 0.81 01/26/2023        36  minutes spent on the date of encounter doing chart review, review of outside records, review of test results, interpretation with above tests, patient visit, documentation, and discussion with family.        This note has been dictated using voice recognition software. Any grammatical, typographical, or context distortions are unintentional and inherent to the software

## 2025-03-11 ENCOUNTER — ANCILLARY PROCEDURE (OUTPATIENT)
Dept: CARDIOLOGY | Facility: CLINIC | Age: OVER 89
End: 2025-03-11
Attending: INTERNAL MEDICINE
Payer: MEDICARE

## 2025-03-11 ENCOUNTER — HOSPITAL ENCOUNTER (EMERGENCY)
Facility: CLINIC | Age: OVER 89
Discharge: HOME | End: 2025-03-11
Attending: EMERGENCY MEDICINE | Admitting: EMERGENCY MEDICINE
Payer: MEDICARE

## 2025-03-11 VITALS
RESPIRATION RATE: 16 BRPM | HEART RATE: 60 BPM | SYSTOLIC BLOOD PRESSURE: 120 MMHG | DIASTOLIC BLOOD PRESSURE: 66 MMHG | HEIGHT: 67 IN | BODY MASS INDEX: 27.47 KG/M2 | WEIGHT: 175 LBS

## 2025-03-11 VITALS
OXYGEN SATURATION: 97 % | DIASTOLIC BLOOD PRESSURE: 54 MMHG | BODY MASS INDEX: 27.41 KG/M2 | HEART RATE: 60 BPM | RESPIRATION RATE: 18 BRPM | WEIGHT: 175 LBS | SYSTOLIC BLOOD PRESSURE: 107 MMHG | TEMPERATURE: 97.6 F

## 2025-03-11 DIAGNOSIS — I50.22 CHRONIC SYSTOLIC HEART FAILURE (H): ICD-10-CM

## 2025-03-11 DIAGNOSIS — I42.8 NICM (NONISCHEMIC CARDIOMYOPATHY) (H): ICD-10-CM

## 2025-03-11 DIAGNOSIS — I10 ESSENTIAL HYPERTENSION: Chronic | ICD-10-CM

## 2025-03-11 DIAGNOSIS — Z95.810 ICD (IMPLANTABLE CARDIOVERTER-DEFIBRILLATOR) IN PLACE: Chronic | ICD-10-CM

## 2025-03-11 DIAGNOSIS — G47.33 OSA ON CPAP: Chronic | ICD-10-CM

## 2025-03-11 DIAGNOSIS — E87.5 HYPERKALEMIA: ICD-10-CM

## 2025-03-11 DIAGNOSIS — I42.9 IDIOPATHIC CARDIOMYOPATHY (H): Primary | Chronic | ICD-10-CM

## 2025-03-11 DIAGNOSIS — I47.29 NSVT (NONSUSTAINED VENTRICULAR TACHYCARDIA) (H): ICD-10-CM

## 2025-03-11 DIAGNOSIS — Z95.810 DUAL ICD (IMPLANTABLE CARDIOVERTER-DEFIBRILLATOR) IN PLACE: ICD-10-CM

## 2025-03-11 DIAGNOSIS — N18.31 STAGE 3A CHRONIC KIDNEY DISEASE (H): Chronic | ICD-10-CM

## 2025-03-11 DIAGNOSIS — I25.10 CORONARY ARTERY CALCIFICATION SEEN ON CT SCAN: ICD-10-CM

## 2025-03-11 LAB
ANION GAP SERPL CALCULATED.3IONS-SCNC: 10 MMOL/L (ref 7–15)
ANION GAP SERPL CALCULATED.3IONS-SCNC: 8 MMOL/L (ref 7–15)
ATRIAL RATE - MUSE: 61 BPM
BUN SERPL-MCNC: 41.8 MG/DL (ref 8–23)
BUN SERPL-MCNC: 42.6 MG/DL (ref 8–23)
CALCIUM SERPL-MCNC: 9.3 MG/DL (ref 8.8–10.4)
CALCIUM SERPL-MCNC: 9.3 MG/DL (ref 8.8–10.4)
CHLORIDE SERPL-SCNC: 104 MMOL/L (ref 98–107)
CHLORIDE SERPL-SCNC: 105 MMOL/L (ref 98–107)
CREAT SERPL-MCNC: 1.43 MG/DL (ref 0.67–1.17)
CREAT SERPL-MCNC: 1.53 MG/DL (ref 0.67–1.17)
DIASTOLIC BLOOD PRESSURE - MUSE: NORMAL MMHG
EGFRCR SERPLBLD CKD-EPI 2021: 43 ML/MIN/1.73M2
EGFRCR SERPLBLD CKD-EPI 2021: 47 ML/MIN/1.73M2
GLUCOSE SERPL-MCNC: 124 MG/DL (ref 70–99)
GLUCOSE SERPL-MCNC: 181 MG/DL (ref 70–99)
HCO3 SERPL-SCNC: 23 MMOL/L (ref 22–29)
HCO3 SERPL-SCNC: 24 MMOL/L (ref 22–29)
HOLD SPECIMEN: NORMAL
INTERPRETATION ECG - MUSE: NORMAL
MAGNESIUM SERPL-MCNC: 2 MG/DL (ref 1.7–2.3)
MDC_IDC_EPISODE_DTM: NORMAL
MDC_IDC_EPISODE_ID: NORMAL
MDC_IDC_EPISODE_TYPE: NORMAL
MDC_IDC_LEAD_CONNECTION_STATUS: NORMAL
MDC_IDC_LEAD_CONNECTION_STATUS: NORMAL
MDC_IDC_LEAD_IMPLANT_DT: NORMAL
MDC_IDC_LEAD_IMPLANT_DT: NORMAL
MDC_IDC_LEAD_LOCATION: NORMAL
MDC_IDC_LEAD_LOCATION: NORMAL
MDC_IDC_LEAD_LOCATION_DETAIL_1: NORMAL
MDC_IDC_LEAD_LOCATION_DETAIL_1: NORMAL
MDC_IDC_LEAD_MFG: NORMAL
MDC_IDC_LEAD_MFG: NORMAL
MDC_IDC_LEAD_MODEL: NORMAL
MDC_IDC_LEAD_MODEL: NORMAL
MDC_IDC_LEAD_POLARITY_TYPE: NORMAL
MDC_IDC_LEAD_POLARITY_TYPE: NORMAL
MDC_IDC_LEAD_SERIAL: NORMAL
MDC_IDC_LEAD_SERIAL: NORMAL
MDC_IDC_MSMT_BATTERY_DTM: NORMAL
MDC_IDC_MSMT_BATTERY_REMAINING_LONGEVITY: 12 MO
MDC_IDC_MSMT_BATTERY_REMAINING_PERCENTAGE: 16 %
MDC_IDC_MSMT_BATTERY_STATUS: NORMAL
MDC_IDC_MSMT_CAP_CHARGE_DTM: NORMAL
MDC_IDC_MSMT_CAP_CHARGE_DTM: NORMAL
MDC_IDC_MSMT_CAP_CHARGE_ENERGY: 17 J
MDC_IDC_MSMT_CAP_CHARGE_TIME: 13.5 S
MDC_IDC_MSMT_CAP_CHARGE_TIME: 3.1 S
MDC_IDC_MSMT_CAP_CHARGE_TYPE: NORMAL
MDC_IDC_MSMT_CAP_CHARGE_TYPE: NORMAL
MDC_IDC_MSMT_LEADCHNL_RA_IMPEDANCE_VALUE: 545 OHM
MDC_IDC_MSMT_LEADCHNL_RA_PACING_THRESHOLD_AMPLITUDE: 1.1 V
MDC_IDC_MSMT_LEADCHNL_RA_PACING_THRESHOLD_PULSEWIDTH: 0.6 MS
MDC_IDC_MSMT_LEADCHNL_RA_SENSING_INTR_AMPL: 4 MV
MDC_IDC_MSMT_LEADCHNL_RV_IMPEDANCE_VALUE: 533 OHM
MDC_IDC_MSMT_LEADCHNL_RV_PACING_THRESHOLD_AMPLITUDE: 0.7 V
MDC_IDC_MSMT_LEADCHNL_RV_PACING_THRESHOLD_PULSEWIDTH: 0.4 MS
MDC_IDC_MSMT_LEADCHNL_RV_SENSING_INTR_AMPL: 16.9 MV
MDC_IDC_PG_IMPLANT_DTM: NORMAL
MDC_IDC_PG_MFG: NORMAL
MDC_IDC_PG_MODEL: NORMAL
MDC_IDC_PG_SERIAL: NORMAL
MDC_IDC_PG_TYPE: NORMAL
MDC_IDC_SESS_CLINIC_NAME: NORMAL
MDC_IDC_SESS_DTM: NORMAL
MDC_IDC_SESS_TYPE: NORMAL
MDC_IDC_SET_BRADY_AT_MODE_SWITCH_MODE: NORMAL
MDC_IDC_SET_BRADY_AT_MODE_SWITCH_RATE: 170 {BEATS}/MIN
MDC_IDC_SET_BRADY_LOWRATE: 60 {BEATS}/MIN
MDC_IDC_SET_BRADY_MAX_SENSOR_RATE: 130 {BEATS}/MIN
MDC_IDC_SET_BRADY_MAX_TRACKING_RATE: 130 {BEATS}/MIN
MDC_IDC_SET_BRADY_MODE: NORMAL
MDC_IDC_SET_BRADY_PAV_DELAY_HIGH: 180 MS
MDC_IDC_SET_BRADY_PAV_DELAY_LOW: 260 MS
MDC_IDC_SET_BRADY_SAV_DELAY_HIGH: 160 MS
MDC_IDC_SET_BRADY_SAV_DELAY_LOW: 230 MS
MDC_IDC_SET_LEADCHNL_RA_PACING_AMPLITUDE: 1.8 V
MDC_IDC_SET_LEADCHNL_RA_PACING_POLARITY: NORMAL
MDC_IDC_SET_LEADCHNL_RA_PACING_PULSEWIDTH: 0.6 MS
MDC_IDC_SET_LEADCHNL_RA_SENSING_ADAPTATION_MODE: NORMAL
MDC_IDC_SET_LEADCHNL_RA_SENSING_POLARITY: NORMAL
MDC_IDC_SET_LEADCHNL_RA_SENSING_SENSITIVITY: 0.8 MV
MDC_IDC_SET_LEADCHNL_RV_PACING_AMPLITUDE: 1.5 V
MDC_IDC_SET_LEADCHNL_RV_PACING_POLARITY: NORMAL
MDC_IDC_SET_LEADCHNL_RV_PACING_PULSEWIDTH: 0.4 MS
MDC_IDC_SET_LEADCHNL_RV_SENSING_ADAPTATION_MODE: NORMAL
MDC_IDC_SET_LEADCHNL_RV_SENSING_POLARITY: NORMAL
MDC_IDC_SET_LEADCHNL_RV_SENSING_SENSITIVITY: 0.6 MV
MDC_IDC_SET_ZONE_DETECTION_INTERVAL: 300 MS
MDC_IDC_SET_ZONE_DETECTION_INTERVAL: 429 MS
MDC_IDC_SET_ZONE_STATUS: NORMAL
MDC_IDC_SET_ZONE_STATUS: NORMAL
MDC_IDC_SET_ZONE_TYPE: NORMAL
MDC_IDC_SET_ZONE_TYPE: NORMAL
MDC_IDC_SET_ZONE_VENDOR_TYPE: NORMAL
MDC_IDC_SET_ZONE_VENDOR_TYPE: NORMAL
MDC_IDC_STAT_AT_BURDEN_PERCENT: 1 %
MDC_IDC_STAT_AT_DTM_END: NORMAL
MDC_IDC_STAT_AT_DTM_START: NORMAL
MDC_IDC_STAT_AT_MODE_SW_COUNT: 156
MDC_IDC_STAT_BRADY_DTM_END: NORMAL
MDC_IDC_STAT_BRADY_DTM_START: NORMAL
MDC_IDC_STAT_BRADY_RA_PERCENT_PACED: 61 %
MDC_IDC_STAT_BRADY_RV_PERCENT_PACED: 1 %
MDC_IDC_STAT_EPISODE_RECENT_COUNT: 0
MDC_IDC_STAT_EPISODE_RECENT_COUNT: 0
MDC_IDC_STAT_EPISODE_RECENT_COUNT: 59
MDC_IDC_STAT_EPISODE_RECENT_COUNT: 9197
MDC_IDC_STAT_EPISODE_RECENT_COUNT_DTM_END: NORMAL
MDC_IDC_STAT_EPISODE_RECENT_COUNT_DTM_START: NORMAL
MDC_IDC_STAT_EPISODE_TYPE: NORMAL
MDC_IDC_STAT_EPISODE_VENDOR_TYPE: NORMAL
MDC_IDC_STAT_TACHYTHERAPY_ATP_DELIVERED_RECENT: 0
MDC_IDC_STAT_TACHYTHERAPY_ATP_DELIVERED_TOTAL: 0
MDC_IDC_STAT_TACHYTHERAPY_RECENT_DTM_END: NORMAL
MDC_IDC_STAT_TACHYTHERAPY_RECENT_DTM_START: NORMAL
MDC_IDC_STAT_TACHYTHERAPY_SHOCKS_ABORTED_RECENT: 0
MDC_IDC_STAT_TACHYTHERAPY_SHOCKS_ABORTED_TOTAL: 0
MDC_IDC_STAT_TACHYTHERAPY_SHOCKS_DELIVERED_RECENT: 0
MDC_IDC_STAT_TACHYTHERAPY_SHOCKS_DELIVERED_TOTAL: 1
MDC_IDC_STAT_TACHYTHERAPY_TOTAL_DTM_END: NORMAL
MDC_IDC_STAT_TACHYTHERAPY_TOTAL_DTM_START: NORMAL
NT-PROBNP SERPL-MCNC: 2048 PG/ML (ref 0–1800)
P AXIS - MUSE: 38 DEGREES
POTASSIUM SERPL-SCNC: 5.6 MMOL/L (ref 3.4–5.3)
POTASSIUM SERPL-SCNC: 5.8 MMOL/L (ref 3.4–5.3)
POTASSIUM SERPL-SCNC: 6.9 MMOL/L (ref 3.4–5.3)
PR INTERVAL - MUSE: 148 MS
QRS DURATION - MUSE: 134 MS
QT - MUSE: 440 MS
QTC - MUSE: 442 MS
R AXIS - MUSE: -48 DEGREES
SODIUM SERPL-SCNC: 136 MMOL/L (ref 135–145)
SODIUM SERPL-SCNC: 138 MMOL/L (ref 135–145)
SYSTOLIC BLOOD PRESSURE - MUSE: NORMAL MMHG
T AXIS - MUSE: 106 DEGREES
VENTRICULAR RATE- MUSE: 61 BPM

## 2025-03-11 PROCEDURE — 80048 BASIC METABOLIC PNL TOTAL CA: CPT | Performed by: NURSE PRACTITIONER

## 2025-03-11 PROCEDURE — 250N000011 HC RX IP 250 OP 636: Performed by: EMERGENCY MEDICINE

## 2025-03-11 PROCEDURE — 3074F SYST BP LT 130 MM HG: CPT | Performed by: NURSE PRACTITIONER

## 2025-03-11 PROCEDURE — 96374 THER/PROPH/DIAG INJ IV PUSH: CPT

## 2025-03-11 PROCEDURE — 82310 ASSAY OF CALCIUM: CPT | Performed by: EMERGENCY MEDICINE

## 2025-03-11 PROCEDURE — 83880 ASSAY OF NATRIURETIC PEPTIDE: CPT | Performed by: NURSE PRACTITIONER

## 2025-03-11 PROCEDURE — 3078F DIAST BP <80 MM HG: CPT | Performed by: NURSE PRACTITIONER

## 2025-03-11 PROCEDURE — 96361 HYDRATE IV INFUSION ADD-ON: CPT

## 2025-03-11 PROCEDURE — 36415 COLL VENOUS BLD VENIPUNCTURE: CPT | Performed by: NURSE PRACTITIONER

## 2025-03-11 PROCEDURE — 80048 BASIC METABOLIC PNL TOTAL CA: CPT | Performed by: EMERGENCY MEDICINE

## 2025-03-11 PROCEDURE — 83735 ASSAY OF MAGNESIUM: CPT | Performed by: EMERGENCY MEDICINE

## 2025-03-11 PROCEDURE — 93005 ELECTROCARDIOGRAM TRACING: CPT | Performed by: EMERGENCY MEDICINE

## 2025-03-11 PROCEDURE — 99214 OFFICE O/P EST MOD 30 MIN: CPT | Performed by: NURSE PRACTITIONER

## 2025-03-11 PROCEDURE — 82565 ASSAY OF CREATININE: CPT | Performed by: EMERGENCY MEDICINE

## 2025-03-11 PROCEDURE — 258N000003 HC RX IP 258 OP 636: Performed by: EMERGENCY MEDICINE

## 2025-03-11 PROCEDURE — 36415 COLL VENOUS BLD VENIPUNCTURE: CPT | Performed by: EMERGENCY MEDICINE

## 2025-03-11 PROCEDURE — 84132 ASSAY OF SERUM POTASSIUM: CPT | Performed by: EMERGENCY MEDICINE

## 2025-03-11 PROCEDURE — G2211 COMPLEX E/M VISIT ADD ON: HCPCS | Performed by: NURSE PRACTITIONER

## 2025-03-11 PROCEDURE — 99284 EMERGENCY DEPT VISIT MOD MDM: CPT | Mod: 25

## 2025-03-11 RX ORDER — FUROSEMIDE 10 MG/ML
20 INJECTION INTRAMUSCULAR; INTRAVENOUS ONCE
Status: COMPLETED | OUTPATIENT
Start: 2025-03-11 | End: 2025-03-11

## 2025-03-11 RX ADMIN — SODIUM CHLORIDE 500 ML: 0.9 INJECTION, SOLUTION INTRAVENOUS at 20:26

## 2025-03-11 RX ADMIN — FUROSEMIDE 20 MG: 10 INJECTION, SOLUTION INTRAVENOUS at 20:26

## 2025-03-11 ASSESSMENT — COLUMBIA-SUICIDE SEVERITY RATING SCALE - C-SSRS
6. HAVE YOU EVER DONE ANYTHING, STARTED TO DO ANYTHING, OR PREPARED TO DO ANYTHING TO END YOUR LIFE?: NO
1. IN THE PAST MONTH, HAVE YOU WISHED YOU WERE DEAD OR WISHED YOU COULD GO TO SLEEP AND NOT WAKE UP?: NO
2. HAVE YOU ACTUALLY HAD ANY THOUGHTS OF KILLING YOURSELF IN THE PAST MONTH?: NO

## 2025-03-11 ASSESSMENT — ACTIVITIES OF DAILY LIVING (ADL)
ADLS_ACUITY_SCORE: 59
ADLS_ACUITY_SCORE: 59

## 2025-03-11 NOTE — PATIENT INSTRUCTIONS
.Anthony Tiwari,    It was a pleasure to see you today at the [facility].     My recommendations after this visit include:    - No medications changes made today    - We will follow up with your lab result     - Please discuss with Dr. Meredith if it is okay for you take Jardiance and let us know    - Low sodium diet < 2000 mg/day, daily weight monitoring, and maintain adequate fluid intake at 50 to 60 ounces per day    -Please call if you experience persistent weight gain 2 to 3 pounds 2 days in a row or 5 pounds in a week with shortness of breath, abdominal bloating and leg swelling    - Follow up with Apryl Jon CNP in 4-6 weeks    - Follow up with Dwayne in July 2025    - Please call Heart Failure Nurse Line at [phone], if you have any questions or concerns

## 2025-03-11 NOTE — LETTER
3/11/2025    Alejandro Meredith MD  [facility] [address] [place] [address]    RE: Anthony Tiwari       Dear Colleague,     I had the pleasure of seeing Anthony Tiwari in the [facility].          Assessment/Recommendations   Assessment:    #   History of idiopathic cardiomyopathy with status post ICD, Acute on chronic systolic heart failure, NYHA Class NYHA Class II:Echocardiogram in October 2024 showed LVEF reduced to 25 to 30%.  Previous echo showed LVEF of 35 to 40% per echo in January 2022.  Current home weight is 171-173 lbs.  Weight is up approximately 8 pounds since hospitalization in October.    Patient is mild hypervolemic on exam.    Heart failure regimen includes:    -Beta-blocker therapy with metoprolol succinate 100 mg daily    -ACEI  therapy with lisinopril 2.5 mg daily    -Aldosterone blocker/MRA therapy with spironolactone 12.5 mg daily    -Not on SGLT2 Inhibitor    -Diuretic therapy with furosemide 10 mg daily    # NSVT: Device check showed NSVT burden less than 1%.  Patient had a follow-up with Dr. Cuenca.     #  Hypertension: Blood pressure stable.    # Chronic kidney disease stage IIIa: BMP stable on 12/30/2024 with potassium 5.3 and creatinine 1.15.    # Obstructive sleep apnea: On CPAP regularly.    # Moderate coronary artery calcification per chest CT on 11/11/2024: Previously noted mild calcification.  No chest pain.  Lexiscan stress test negative for inducible myocardial ischemia in December 2024.    Plan/Recommendation:  -BMP and NT proBNP pending.  -Discussed with PCP regarding starting on Jardiance.  -If unable to start Jardiance, consider increasing ACE inhibitor if renal function and potassium allows or increased beta-blocker therapy    Follow up with Dr. Reyez in July 2025. Follow up with me in 4 to 6 weeks.     The longitudinal plan of care for acute on chronic systolic heart failure was addressed during this visit.?Due to the added  "  complexity in care, I will continue to support Anthony Tiwari in the subsequent management of this   condition(s) and with the ongoing continuity of care of this condition(s)\".     History of Present Illness/Subjective    Mr. Anthony Tiwari is a 89 year old male with a past medical history of nonischemic cardiomyopathy status post ICD, NSVT, recent hospitalization with pneumonia in September, and rehospitalization in October with acute decompensated heart failure and acute on chronic kidney disease who is seen at [facility] for continued heart failure follow-up.    Today, Wolf is accompanied by his spouse and his daughter.  He is tolerating spironolactone with no adverse effects.  He reports a few pounds weight gain.  However he denies worsening heart failure symptoms.  He denies fatigue, lightheadedness, shortness of breath, dyspnea on exertion, orthopnea, PND, palpitations, chest pain, abdominal fullness/bloating, and lower extremity edema.  He reports compliant with taking his diuretic therapy regularly.      ECHO from 10/2024-Reviewed:   Interpretation Summary   Lft ventricular function is decreased. The ejection fraction is 25-30%  (severely reduced).  Normal right ventricle size and systolic function.  The left atrium is moderate to severely dilated.  The right atrium is mild to moderately dilated.  There is mild (1+) aortic regurgitation.  IVC diameter <2.1 cm collapsing >50% with sniff suggests a normal RA pressure  of 3 mmHg.  The patient exhibited frequent PVCs.     Physical Examination Review of Systems   /66 (BP Location: Left arm, Patient Position: Sitting, Cuff Size: Adult Regular)   Pulse 60   Resp 16   Ht 1.702 m (5' 7\")   Wt 79.4 kg (175 lb)   BMI 27.41 kg/m    Body mass index is 27.41 kg/m .  Wt Readings from Last 3 Encounters:   03/11/25 79.4 kg (175 lb)   02/03/25 79.2 kg (174 lb 9.6 oz)   01/03/25 74.8 kg (165 lb)     General Appearance:   no " "distress, normal body habitus   ENT/Mouth: membranes moist, no oral lesions or bleeding gums.      EYES:  no scleral icterus, normal conjunctivae   Neck: no carotid bruits or thyromegaly   Chest/Lungs:   lungs are clear to auscultation, no rales or wheezing, equal chest wall expansion   Cardiovascular:    Normal first and second heart sounds with no murmurs, rubs, or gallops; Jugular venous pressure flat; trace edema bilaterally    Abdomen:  no organomegaly, masses, bruits, or tenderness; bowel sounds are present   Extremities   no cyanosis or clubbing    CMS intact.   Skin: no xanthelasma, warm.    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 Cody at [facility], getting chemo again in 2017     Nonocclusive coronary atherosclerosis of native coronary artery     nonobstuctive by cor angio       SIRIA on CPAP 2013    he sees someone at [facility]     Other primary cardiomyopathies 2013    EF was as low as 23%     Statin intolerance 02/21/2019    history of elevated CPK on both simvastatin but less so on atorvastatin.     Type 2 diabetes mellitus (H)     Past Surgical History:   Procedure Laterality Date     CARDIAC DEFIBRILLATOR PLACEMENT  07/24/2013 "    Dr. Rodriguze     CATARACT EXTRACTION W/ INTRAOCULAR LENS  IMPLANT, BILATERAL  05/2018     CYSTOSCOPY W/ LITHOLAPAXY / EHL N/A 03/19/2019    Procedure: CYSTOLITHOLAPAXY;  Surgeon: Yunior Moore MD;  Location: [facility];  Service: Urology     HERNIA REPAIR Left      IL CYSTO/URETERO W/LITHOTRIPSY &INDWELL STENT INSRT Right 03/19/2019    Procedure: CYSTOSCOPY RIGHT URETEROSCOPY LASER LITHOTRIPSY;  Surgeon: Yunior Moore MD;  Location: [facility];  Service: Urology     TOTAL HIP ARTHROPLASTY Right 2012     URETEROSCOPY      Family History   Problem Relation Age of Onset     Rheumatic Heart Disease Brother         half brother     Dementia Brother      Cerebrovascular Disease Father      Rheumatic Heart Disease Sister      Sudden Death Sister 67.00        no autopsy     CABG Sister 81.00     No Known Problems Daughter      No Known Problems Daughter      No Known Problems Daughter      No Known Problems Granddaughter      No Known Problems Grandson      Urolithiasis No family hx of      Clotting Disorder No family hx of      Gout No family hx of     Social History     Socioeconomic History     Marital status:      Spouse name: Not on file     Number of children: 3     Years of education: Not on file     Highest education level: Not on file   Occupational History     Not on file   Tobacco Use     Smoking status: Never     Smokeless tobacco: Never   Vaping Use     Vaping status: Never Used   Substance and Sexual Activity     Alcohol use: No     Drug use: No     Sexual activity: Not on file   Other Topics Concern     Not on file   Social History Narrative    He used to walk for an hour most days and still takes care of his lawn and snow removal, though his grandsons are helping more with the lawn in 2018. 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 He is walking less in 2019. His wife,  Rebeka, is 6 years his tu and is providing daytime and overnight   for their daughter Loree's fraternal twins, Estrella and Jovanny, since 2019. Anthony spends 5 hours a day with Loree and the twins.     Social Drivers of Health     Financial Resource Strain: Low Risk  (10/23/2024)    Financial Resource Strain      Within the past 12 months, have you or your family members you live with been unable to get utilities (heat, electricity) when it was really needed?: No   Food Insecurity: Low Risk  (10/23/2024)    Food Insecurity      Within the past 12 months, did you worry that your food would run out before you got money to buy more?: No      Within the past 12 months, did the food you bought just not last and you didn t have money to get more?: No   Transportation Needs: Low Risk  (10/23/2024)    Transportation Needs      Within the past 12 months, has lack of transportation kept you from medical appointments, getting your medicines, non-medical meetings or appointments, work, or from getting things that you need?: No   Physical Activity: Not on file   Stress: Not on file   Social Connections: Unknown (1/1/2022)    Received from [facility]    Social Connections      Frequency of Communication with Friends and Family: Not on file   Interpersonal Safety: Low Risk  (10/23/2024)    Interpersonal Safety      Do you feel physically and emotionally safe where you currently live?: Yes      Within the past 12 months, have you been hit, slapped, kicked or otherwise physically hurt by someone?: No      Within the past 12 months, have you been humiliated or emotionally abused in other ways by your partner or ex-partner?: No   Housing Stability: Low Risk  (10/23/2024)    Housing Stability      Do you have housing? : Yes      Are you worried about losing your housing?: No          Medications  Allergies   Current Outpatient Medications   Medication Sig Dispense Refill     aspirin 81 MG EC tablet [ASPIRIN 81 MG EC  TABLET] Take 81 mg by mouth bedtime.       atorvastatin (LIPITOR) 10 MG tablet Take 10 mg by mouth at bedtime.       carbidopa-levodopa (SINEMET)  MG tablet Take 2 tablets by mouth every morning. (Also taking 1 tablet midday and 1 tablet at bedtime)       furosemide (LASIX) 20 MG tablet Take 0.5 tablets (10 mg) by mouth daily. 45 tablet 2     LANTUS VIAL 100 UNIT/ML soln Inject 10 Units subcutaneously at bedtime.       lisinopril (ZESTRIL) 2.5 MG tablet Take 1 tablet (2.5 mg) by mouth daily. 90 tablet 2     magnesium oxide 400 MG tablet Take 1 tablet (400 mg) by mouth daily. 90 tablet 0     metFORMIN (GLUCOPHAGE) 500 MG tablet Take 500 mg by mouth 2 times daily (with meals). 2 pills in the am and 2 pills in the pm       metoprolol succinate ER (TOPROL XL) 100 MG 24 hr tablet Take 1 tablet (100 mg) by mouth at bedtime. 90 tablet 2     spironolactone (ALDACTONE) 25 MG tablet Take 0.5 tablets (12.5 mg) by mouth daily. 90 tablet 1     UNABLE TO FIND Spray 1 Application in nostril at bedtime. MEDICATION NAME: CPAP       CONTOUR NEXT TEST test strip USE TO TEST BLOOD SUGAR DAILY (Patient not taking: Reported on 3/11/2025)      Allergies   Allergen Reactions     Simvastatin Other (See Comments)     Elevated CPK         Lab Results    Chemistry/lipid CBC Cardiac Enzymes/BNP/TSH/INR   Lab Results   Component Value Date    CHOL 109 06/04/2024    HDL 28 (L) 06/04/2024    TRIG 359 (H) 06/04/2024    BUN 34.8 (H) 12/30/2024     12/30/2024    CO2 20 (L) 12/30/2024    Lab Results   Component Value Date    WBC 8.2 10/22/2024    HGB 12.3 (L) 10/22/2024    HCT 37.7 (L) 10/22/2024    MCV 91 10/22/2024     10/22/2024    Lab Results   Component Value Date    TROPONINI 0.02 04/10/2021     (H) 06/14/2021    TSH 0.81 01/26/2023        36  minutes spent on the date of encounter doing chart review, review of outside records, review of test results, interpretation with above tests, patient visit, documentation, and  discussion with family.        This note has been dictated using voice recognition software. Any grammatical, typographical, or context distortions are unintentional and inherent to the software          Thank you for allowing me to participate in the care of your patient.      Sincerely,     MELANI Byrne [facility]  cc:   Malathi Allen MD  [address]  [place] [address]

## 2025-03-12 ENCOUNTER — LAB REQUISITION (OUTPATIENT)
Dept: LAB | Facility: CLINIC | Age: OVER 89
End: 2025-03-12
Payer: MEDICARE

## 2025-03-12 DIAGNOSIS — N18.32 CHRONIC KIDNEY DISEASE, STAGE 3B (H): ICD-10-CM

## 2025-03-12 PROCEDURE — 80053 COMPREHEN METABOLIC PANEL: CPT | Mod: ORL | Performed by: FAMILY MEDICINE

## 2025-03-12 NOTE — ED PROVIDER NOTES
EMERGENCY DEPARTMENT ENCOUNTER      NAME: Anthony Tiwari  AGE: 89 year old male  YOB: 1935  MRN: [id number]  EVALUATION DATE & TIME: No admission date for patient encounter.    PCP: Alejandro Meredith    ED PROVIDER: Meme Crandall MD      Chief Complaint   Patient presents with    Abnormal Labs         FINAL IMPRESSION:  1. Hyperkalemia          ED COURSE & MEDICAL DECISION MAKING:    Pertinent Labs & Imaging studies reviewed. (See chart for details)  89 year old male presents to the Emergency Department for evaluation of hyperkalemia as found in cardiology clinic today.  The patient had laboratory studies drawn, was found to have a potassium of 6.9.  He therefore received a call from the clinic to come to the emergency department.  He is otherwise asymptomatic with his potassium of 6.9.  On repeat.  Is 5.8.  The patient's daughter reports that he does have a history of hyperkalemia.  He was told to limit his intake of potassium rich foods.  EKG was obtained with no significant changes.  Plan to give the patient IV fluids, a dose of furosemide and recheck potassium for improvement.    Patient's potassium improved to 5.6.  Would recommend holding spironolactone, taking a double dose of furosemide, instead of his normal half tablet, take a full tablet for 20 mg daily.  I would recommend having his potassium rechecked tomorrow at his primary care provider.  Patient and the patient's daughter and patient's wife are comfortable with this plan.    At the conclusion of the encounter I discussed the results of all of the tests and the disposition. The questions were answered. The patient or family acknowledged understanding and was agreeable with the care plan.     Medical Decision Making  Obtained supplemental history:Supplemental history obtained?: No  Reviewed external records: External records reviewed?: No  Care impacted by chronic illness:Documented in Chart  Did you consider but not order tests?:  Work up considered but not performed and documented in chart, if applicable  Did you interpret images independently?: Independent interpretation of ECG and images noted in documentation, when applicable.  Consultation discussion with other provider:Did you involve another provider (consultant, , pharmacy, etc.)?: No  Discharge. I recommended discontinuing prescription strength medication(s) as charted. See documentation for any additional details.    MIPS: Not Applicable        MEDICATIONS GIVEN IN THE EMERGENCY:  Medications   sodium chloride 0.9% BOLUS 500 mL (0 mLs Intravenous Stopped 3/11/25 2123)   furosemide (LASIX) injection 20 mg (20 mg Intravenous $Given 3/11/25 2026)       NEW PRESCRIPTIONS STARTED AT TODAY'S ER VISIT  New Prescriptions    No medications on file          =================================================================    HPI    Patient information was obtained from: Patient, patient's daughter, patient's wife    Use of : N/A         Anthony Tiwari is a 89 year old male with a pertinent history of CKD stage 3 who presents to this ED for evaluation of potassium of 6.9 in clinic.  Patient was seen in cardiology clinic today, he has a history of idiopathic cardiomyopathy, acute on chronic systolic heart failure.  Patient had laboratory studies done, he received a call this evening stating that his potassium was 6.9 and he should present to the emergency department.  Patient is otherwise asymptomatic with it.  No chest pain, shortness of breath.  Asymptomatic otherwise.  Patient's daughter reports that he does not normally drink much fluids.       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 Cody at [facility], getting chemo again in 2017    Nonocclusive coronary atherosclerosis of native coronary artery     nonobstuctive by cor angio      SIRIA on CPAP 2013    he sees someone at [facility]    Other primary cardiomyopathies 2013    EF was as low as 23%    Statin intolerance 02/21/2019    history of elevated CPK on both simvastatin but less so on atorvastatin.    Type 2 diabetes mellitus (H)        PAST SURGICAL HISTORY:  Past Surgical History:   Procedure Laterality Date    CARDIAC DEFIBRILLATOR PLACEMENT  07/24/2013    Dr. Rodriguez    CATARACT EXTRACTION W/ INTRAOCULAR LENS  IMPLANT, BILATERAL  05/2018    CYSTOSCOPY W/ LITHOLAPAXY / EHL N/A 03/19/2019    Procedure: CYSTOLITHOLAPAXY;  Surgeon: Yunior Moore MD;  Location: [facility];  Service: Urology    HERNIA REPAIR Left     ID CYSTO/URETERO W/LITHOTRIPSY &INDWELL STENT INSRT Right 03/19/2019    Procedure: CYSTOSCOPY RIGHT URETEROSCOPY LASER LITHOTRIPSY;  Surgeon: Yunior Moore MD;  Location: [facility];  Service: Urology    TOTAL HIP ARTHROPLASTY Right 2012    URETEROSCOPY             CURRENT MEDICATIONS:    aspirin 81 MG EC tablet  atorvastatin (LIPITOR) 10 MG tablet  carbidopa-levodopa (SINEMET)  MG tablet  CONTOUR NEXT TEST test strip  furosemide (LASIX) 20 MG tablet  LANTUS VIAL 100 UNIT/ML soln  lisinopril (ZESTRIL) 2.5 MG tablet  magnesium oxide 400 MG tablet  metFORMIN (GLUCOPHAGE) 500 MG tablet  metoprolol succinate ER (TOPROL XL) 100 MG 24 hr tablet  spironolactone (ALDACTONE) 25 MG tablet  UNABLE TO FIND        ALLERGIES:  Allergies   Allergen Reactions    Simvastatin Other (See Comments)     Elevated CPK       FAMILY HISTORY:  Family History   Problem Relation Age of Onset    Rheumatic Heart Disease Brother         half brother    Dementia Brother     Cerebrovascular Disease Father      Rheumatic Heart Disease Sister     Sudden Death Sister 67.00        no autopsy    CABG Sister 81.00    No Known Problems Daughter     No Known Problems Daughter     No Known Problems Daughter     No Known Problems Granddaughter     No Known Problems Grandson     Urolithiasis No family hx of     Clotting Disorder No family hx of     Gout No family hx of        SOCIAL HISTORY:   Social History     Socioeconomic History    Marital status:     Number of children: 3   Tobacco Use    Smoking status: Never    Smokeless tobacco: Never   Vaping Use    Vaping status: Never Used   Substance and Sexual Activity    Alcohol use: No    Drug use: No   Social History Narrative    He used to walk for an hour most days and still takes care of his lawn and snow removal, though his grandsons are helping more with the lawn in 2018. 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 He is walking less in 2019. His wife,  Rebeka, is 6 years his tu and is providing daytime and overnight  for their daughter Loree's fraternal twins, Estrella and Jovanny, since 2019. Anthony spends 5 hours a day with Loree and the twins.     Social Drivers of Health     Financial Resource Strain: Low Risk  (10/23/2024)    Financial Resource Strain     Within the past 12 months, have you or your family members you live with been unable to get utilities (heat, electricity) when it was really needed?: No   Food Insecurity: Low Risk  (10/23/2024)    Food Insecurity     Within the past 12 months, did you worry that your food would run out before you got money to buy more?: No     Within the past 12 months, did the food you bought just not last and you didn t have money to get more?: No   Transportation Needs: Low Risk  (10/23/2024)    Transportation Needs     Within the past 12 months, has lack of transportation kept you from medical appointments, getting your medicines, non-medical meetings or appointments, work, or from  getting things that you need?: No    Received from [facility], [facility]    Social Connections   Interpersonal Safety: Low Risk  (10/23/2024)    Interpersonal Safety     Do you feel physically and emotionally safe where you currently live?: Yes     Within the past 12 months, have you been hit, slapped, kicked or otherwise physically hurt by someone?: No     Within the past 12 months, have you been humiliated or emotionally abused in other ways by your partner or ex-partner?: No   Housing Stability: Low Risk  (10/23/2024)    Housing Stability     Do you have housing? : Yes     Are you worried about losing your housing?: No       VITALS:  /54   Pulse 60   Temp 97.6  F (36.4  C) (Temporal)   Resp 18   Wt 79.4 kg (175 lb)   SpO2 97%   BMI 27.41 kg/m      PHYSICAL EXAM    Constitutional: Well developed, Well nourished, NAD  HENT: Normocephalic, Atraumatic, Bilateral external ears normal, Oropharynx normal, mucous membranes moist, Nose normal.   Neck- Normal range of motion, No tenderness, Supple, No stridor.  Eyes: PERRL, EOMI, Conjunctiva normal, No discharge.   Respiratory: Normal breath sounds, No respiratory distress  Cardiovascular: Normal heart rate, Regular rhythm  GI: Bowel sounds normal, Soft, No tenderness,   Musculoskeletal: No edema. Good range of motion in all major joints. No tenderness to palpation or major deformities noted.   Integument: Warm, Dry, No erythema, No rash  Neurologic: Alert & oriented x 3, Normal motor function, Normal sensory function, No focal deficits noted. Normal gait.   Psychiatric: Affect normal, Judgment normal, Mood normal.      LAB:  All pertinent labs reviewed and interpreted.  Results for orders placed or performed during the hospital encounter of 03/11/25   Extra Blue Top Tube   Result Value Ref Range    Hold Specimen JIC    Extra Red Top Tube   Result Value Ref Range    Hold Specimen JIC    Extra  Green Top (Lithium Heparin) Tube   Result Value Ref Range    Hold Specimen JIC    Extra Purple Top Tube   Result Value Ref Range    Hold Specimen JIC    Result Value Ref Range    Magnesium 2.0 1.7 - 2.3 mg/dL   Basic metabolic panel   Result Value Ref Range    Sodium 138 135 - 145 mmol/L    Potassium 5.8 (H) 3.4 - 5.3 mmol/L    Chloride 105 98 - 107 mmol/L    Carbon Dioxide (CO2) 23 22 - 29 mmol/L    Anion Gap 10 7 - 15 mmol/L    Urea Nitrogen 41.8 (H) 8.0 - 23.0 mg/dL    Creatinine 1.53 (H) 0.67 - 1.17 mg/dL    GFR Estimate 43 (L) >60 mL/min/1.73m2    Calcium 9.3 8.8 - 10.4 mg/dL    Glucose 181 (H) 70 - 99 mg/dL   Result Value Ref Range    Potassium 5.6 (H) 3.4 - 5.3 mmol/L   ECG 12-LEAD WITH MUSE (LHE)   Result Value Ref Range    Systolic Blood Pressure  mmHg    Diastolic Blood Pressure  mmHg    Ventricular Rate 61 BPM    Atrial Rate 61 BPM    MN Interval 148 ms    QRS Duration 134 ms     ms    QTc 442 ms    P Axis 38 degrees    R AXIS -48 degrees    T Axis 106 degrees    Interpretation ECG       Sinus rhythm with Premature atrial complexes  Left axis deviation  Left bundle branch block  Abnormal ECG  When compared with ECG of 22-Oct-2024 17:25,  Premature ventricular complexes are no longer Present  Premature atrial complexes are now Present  Confirmed by SEE ED PROVIDER NOTE FOR, ECG INTERPRETATION ([id number]),  ROSANGELA HOLLIS ([id number]) on 3/11/2025 7:19:21 PM         RADIOLOGY:  Reviewed all pertinent imaging. Please see official radiology report.  No orders to display       EKG:    Performed at: 19:16    Impression: sinus rhythm with PAC's, LBBB    Rate: 61 bpm  Rhythm: sinus    MN Interval: 148 ms  QRS Interval: 134 ms  QTc Interval: 442 ms  ST Changes: no acute ischemia  Comparison: when compared to EKG from 10/22/24, PVC's no longer present, PAC's now present    I have independently reviewed and interpreted the EKG(s) documented above.      Meme Crandall MD  Emergency Medicine  [facility]  [facility] EMERGENCY ROOM  [address] [id number]  [phone]       Meme Crandall MD  03/11/25 [id number]

## 2025-03-12 NOTE — DISCHARGE INSTRUCTIONS
Your potassium today is 5.6.  Please hold your spironolactone.  Take a full tablet of your Lasix daily for the next couple days.  Follow-up with your primary care provider for recheck of your potassium tomorrow.

## 2025-03-12 NOTE — ED TRIAGE NOTES
Pt arrives to ED with c/o high potassium that was sent off to lab today from his cardiologist appointment. Pt feels fine states not chest pain or shortness of breath. Would not be here if they didn't call him to tell him to come in.      Triage Assessment (Adult)       Row Name 03/11/25 6885          Triage Assessment    Airway WDL WDL        Respiratory WDL    Respiratory WDL WDL        Skin Circulation/Temperature WDL    Skin Circulation/Temperature WDL WDL        Cardiac WDL    Cardiac WDL WDL        Peripheral/Neurovascular WDL    Peripheral Neurovascular WDL WDL        Cognitive/Neuro/Behavioral WDL    Cognitive/Neuro/Behavioral WDL WDL

## 2025-03-13 LAB
ALBUMIN SERPL BCG-MCNC: 4.3 G/DL (ref 3.5–5.2)
ALP SERPL-CCNC: 110 U/L (ref 40–150)
ALT SERPL W P-5'-P-CCNC: 11 U/L (ref 0–70)
ANION GAP SERPL CALCULATED.3IONS-SCNC: 14 MMOL/L (ref 7–15)
AST SERPL W P-5'-P-CCNC: 26 U/L (ref 0–45)
BILIRUB SERPL-MCNC: 0.9 MG/DL
BUN SERPL-MCNC: 39.3 MG/DL (ref 8–23)
CALCIUM SERPL-MCNC: 9.3 MG/DL (ref 8.8–10.4)
CHLORIDE SERPL-SCNC: 102 MMOL/L (ref 98–107)
CREAT SERPL-MCNC: 1.53 MG/DL (ref 0.67–1.17)
EGFRCR SERPLBLD CKD-EPI 2021: 43 ML/MIN/1.73M2
GLUCOSE SERPL-MCNC: 155 MG/DL (ref 70–99)
HCO3 SERPL-SCNC: 22 MMOL/L (ref 22–29)
POTASSIUM SERPL-SCNC: 5.8 MMOL/L (ref 3.4–5.3)
PROT SERPL-MCNC: 6.9 G/DL (ref 6.4–8.3)
SODIUM SERPL-SCNC: 138 MMOL/L (ref 135–145)

## 2025-03-21 LAB
MDC_IDC_EPISODE_DTM: NORMAL
MDC_IDC_EPISODE_ID: NORMAL
MDC_IDC_EPISODE_TYPE: NORMAL
MDC_IDC_LEAD_CONNECTION_STATUS: NORMAL
MDC_IDC_LEAD_CONNECTION_STATUS: NORMAL
MDC_IDC_LEAD_IMPLANT_DT: NORMAL
MDC_IDC_LEAD_IMPLANT_DT: NORMAL
MDC_IDC_LEAD_LOCATION: NORMAL
MDC_IDC_LEAD_LOCATION: NORMAL
MDC_IDC_LEAD_LOCATION_DETAIL_1: NORMAL
MDC_IDC_LEAD_LOCATION_DETAIL_1: NORMAL
MDC_IDC_LEAD_MFG: NORMAL
MDC_IDC_LEAD_MFG: NORMAL
MDC_IDC_LEAD_MODEL: NORMAL
MDC_IDC_LEAD_MODEL: NORMAL
MDC_IDC_LEAD_POLARITY_TYPE: NORMAL
MDC_IDC_LEAD_POLARITY_TYPE: NORMAL
MDC_IDC_LEAD_SERIAL: NORMAL
MDC_IDC_LEAD_SERIAL: NORMAL
MDC_IDC_MSMT_BATTERY_DTM: NORMAL
MDC_IDC_MSMT_BATTERY_REMAINING_LONGEVITY: 12 MO
MDC_IDC_MSMT_BATTERY_REMAINING_PERCENTAGE: 16 %
MDC_IDC_MSMT_BATTERY_STATUS: NORMAL
MDC_IDC_MSMT_CAP_CHARGE_DTM: NORMAL
MDC_IDC_MSMT_CAP_CHARGE_DTM: NORMAL
MDC_IDC_MSMT_CAP_CHARGE_ENERGY: 17 J
MDC_IDC_MSMT_CAP_CHARGE_TIME: 13.5 S
MDC_IDC_MSMT_CAP_CHARGE_TIME: 3.1 S
MDC_IDC_MSMT_CAP_CHARGE_TYPE: NORMAL
MDC_IDC_MSMT_CAP_CHARGE_TYPE: NORMAL
MDC_IDC_MSMT_LEADCHNL_RA_IMPEDANCE_VALUE: 545 OHM
MDC_IDC_MSMT_LEADCHNL_RA_PACING_THRESHOLD_AMPLITUDE: 1.1 V
MDC_IDC_MSMT_LEADCHNL_RA_PACING_THRESHOLD_PULSEWIDTH: 0.6 MS
MDC_IDC_MSMT_LEADCHNL_RA_SENSING_INTR_AMPL: 4 MV
MDC_IDC_MSMT_LEADCHNL_RV_IMPEDANCE_VALUE: 533 OHM
MDC_IDC_MSMT_LEADCHNL_RV_PACING_THRESHOLD_AMPLITUDE: 0.7 V
MDC_IDC_MSMT_LEADCHNL_RV_PACING_THRESHOLD_PULSEWIDTH: 0.4 MS
MDC_IDC_MSMT_LEADCHNL_RV_SENSING_INTR_AMPL: 16.9 MV
MDC_IDC_PG_IMPLANT_DTM: NORMAL
MDC_IDC_PG_MFG: NORMAL
MDC_IDC_PG_MODEL: NORMAL
MDC_IDC_PG_SERIAL: NORMAL
MDC_IDC_PG_TYPE: NORMAL
MDC_IDC_SESS_CLINIC_NAME: NORMAL
MDC_IDC_SESS_DTM: NORMAL
MDC_IDC_SESS_TYPE: NORMAL
MDC_IDC_SET_BRADY_AT_MODE_SWITCH_MODE: NORMAL
MDC_IDC_SET_BRADY_AT_MODE_SWITCH_RATE: 170 {BEATS}/MIN
MDC_IDC_SET_BRADY_LOWRATE: 60 {BEATS}/MIN
MDC_IDC_SET_BRADY_MAX_SENSOR_RATE: 130 {BEATS}/MIN
MDC_IDC_SET_BRADY_MAX_TRACKING_RATE: 130 {BEATS}/MIN
MDC_IDC_SET_BRADY_MODE: NORMAL
MDC_IDC_SET_BRADY_PAV_DELAY_HIGH: 180 MS
MDC_IDC_SET_BRADY_PAV_DELAY_LOW: 260 MS
MDC_IDC_SET_BRADY_SAV_DELAY_HIGH: 160 MS
MDC_IDC_SET_BRADY_SAV_DELAY_LOW: 230 MS
MDC_IDC_SET_LEADCHNL_RA_PACING_AMPLITUDE: 1.8 V
MDC_IDC_SET_LEADCHNL_RA_PACING_POLARITY: NORMAL
MDC_IDC_SET_LEADCHNL_RA_PACING_PULSEWIDTH: 0.6 MS
MDC_IDC_SET_LEADCHNL_RA_SENSING_ADAPTATION_MODE: NORMAL
MDC_IDC_SET_LEADCHNL_RA_SENSING_POLARITY: NORMAL
MDC_IDC_SET_LEADCHNL_RA_SENSING_SENSITIVITY: 0.8 MV
MDC_IDC_SET_LEADCHNL_RV_PACING_AMPLITUDE: 1.5 V
MDC_IDC_SET_LEADCHNL_RV_PACING_POLARITY: NORMAL
MDC_IDC_SET_LEADCHNL_RV_PACING_PULSEWIDTH: 0.4 MS
MDC_IDC_SET_LEADCHNL_RV_SENSING_ADAPTATION_MODE: NORMAL
MDC_IDC_SET_LEADCHNL_RV_SENSING_POLARITY: NORMAL
MDC_IDC_SET_LEADCHNL_RV_SENSING_SENSITIVITY: 0.6 MV
MDC_IDC_SET_ZONE_DETECTION_INTERVAL: 300 MS
MDC_IDC_SET_ZONE_DETECTION_INTERVAL: 429 MS
MDC_IDC_SET_ZONE_STATUS: NORMAL
MDC_IDC_SET_ZONE_STATUS: NORMAL
MDC_IDC_SET_ZONE_TYPE: NORMAL
MDC_IDC_SET_ZONE_TYPE: NORMAL
MDC_IDC_SET_ZONE_VENDOR_TYPE: NORMAL
MDC_IDC_SET_ZONE_VENDOR_TYPE: NORMAL
MDC_IDC_STAT_AT_BURDEN_PERCENT: 1 %
MDC_IDC_STAT_AT_DTM_END: NORMAL
MDC_IDC_STAT_AT_DTM_START: NORMAL
MDC_IDC_STAT_AT_MODE_SW_COUNT: 156
MDC_IDC_STAT_BRADY_DTM_END: NORMAL
MDC_IDC_STAT_BRADY_DTM_START: NORMAL
MDC_IDC_STAT_BRADY_RA_PERCENT_PACED: 61 %
MDC_IDC_STAT_BRADY_RV_PERCENT_PACED: 1 %
MDC_IDC_STAT_EPISODE_RECENT_COUNT: 0
MDC_IDC_STAT_EPISODE_RECENT_COUNT: 0
MDC_IDC_STAT_EPISODE_RECENT_COUNT: 59
MDC_IDC_STAT_EPISODE_RECENT_COUNT: 9197
MDC_IDC_STAT_EPISODE_RECENT_COUNT_DTM_END: NORMAL
MDC_IDC_STAT_EPISODE_RECENT_COUNT_DTM_START: NORMAL
MDC_IDC_STAT_EPISODE_TYPE: NORMAL
MDC_IDC_STAT_EPISODE_VENDOR_TYPE: NORMAL
MDC_IDC_STAT_TACHYTHERAPY_ATP_DELIVERED_RECENT: 0
MDC_IDC_STAT_TACHYTHERAPY_ATP_DELIVERED_TOTAL: 0
MDC_IDC_STAT_TACHYTHERAPY_RECENT_DTM_END: NORMAL
MDC_IDC_STAT_TACHYTHERAPY_RECENT_DTM_START: NORMAL
MDC_IDC_STAT_TACHYTHERAPY_SHOCKS_ABORTED_RECENT: 0
MDC_IDC_STAT_TACHYTHERAPY_SHOCKS_ABORTED_TOTAL: 0
MDC_IDC_STAT_TACHYTHERAPY_SHOCKS_DELIVERED_RECENT: 0
MDC_IDC_STAT_TACHYTHERAPY_SHOCKS_DELIVERED_TOTAL: 1
MDC_IDC_STAT_TACHYTHERAPY_TOTAL_DTM_END: NORMAL
MDC_IDC_STAT_TACHYTHERAPY_TOTAL_DTM_START: NORMAL

## 2025-03-21 PROCEDURE — 93283 PRGRMG EVAL IMPLANTABLE DFB: CPT | Performed by: INTERNAL MEDICINE
